# Patient Record
Sex: FEMALE | Race: WHITE | Employment: PART TIME
[De-identification: names, ages, dates, MRNs, and addresses within clinical notes are randomized per-mention and may not be internally consistent; named-entity substitution may affect disease eponyms.]

---

## 2023-08-25 PROBLEM — I51.89 IMPAIRED CARDIAC FUNCTION: Status: ACTIVE | Noted: 2023-08-25

## 2023-08-25 PROBLEM — I25.10 ATHEROSCLEROTIC HEART DISEASE OF NATIVE CORONARY ARTERY WITHOUT ANGINA PECTORIS: Status: ACTIVE | Noted: 2023-08-25

## 2023-08-25 PROBLEM — M25.511 RIGHT SHOULDER PAIN: Status: ACTIVE | Noted: 2019-11-26

## 2023-08-25 PROBLEM — R42 INTERMITTENT VERTIGO: Status: ACTIVE | Noted: 2020-09-21

## 2023-08-25 PROBLEM — S99.929A INJURY OF TOE: Status: ACTIVE | Noted: 2023-08-25

## 2023-08-25 PROBLEM — I50.9 HEART FAILURE (MULTI): Status: ACTIVE | Noted: 2023-08-25

## 2023-08-25 PROBLEM — Z95.1 S/P CORONARY ARTERY BYPASS GRAFT X 3: Status: ACTIVE | Noted: 2023-08-25

## 2023-08-25 PROBLEM — R06.83 SNORES: Status: ACTIVE | Noted: 2021-11-11

## 2023-08-25 PROBLEM — E11.9 DM (DIABETES MELLITUS) (MULTI): Status: ACTIVE | Noted: 2023-08-25

## 2023-08-25 PROBLEM — H93.13 TINNITUS OF BOTH EARS: Status: ACTIVE | Noted: 2023-08-25

## 2023-08-25 PROBLEM — U07.1 LAB TEST POSITIVE FOR DETECTION OF COVID-19 VIRUS: Status: RESOLVED | Noted: 2020-11-23 | Resolved: 2023-08-25

## 2023-08-25 PROBLEM — F33.9 DEPRESSION, RECURRENT (CMS-HCC): Status: ACTIVE | Noted: 2019-11-26

## 2023-08-25 PROBLEM — F43.22 ADJUSTMENT REACTION WITH ANXIETY: Status: ACTIVE | Noted: 2020-09-21

## 2023-08-25 PROBLEM — N95.0 POST-MENOPAUSE BLEEDING: Status: ACTIVE | Noted: 2022-05-26

## 2023-08-25 PROBLEM — S89.90XA INJURY OF KNEE: Status: ACTIVE | Noted: 2023-08-25

## 2023-08-25 PROBLEM — F43.23 ADJUSTMENT REACTION WITH ANXIETY AND DEPRESSION: Status: ACTIVE | Noted: 2019-12-16

## 2023-08-25 PROBLEM — N18.2 CHRONIC KIDNEY DISEASE, STAGE 2 (MILD): Status: ACTIVE | Noted: 2023-08-25

## 2023-08-25 PROBLEM — H90.6 MIXED CONDUCTIVE AND SENSORINEURAL HEARING LOSS OF BOTH EARS: Status: ACTIVE | Noted: 2023-08-25

## 2023-08-25 PROBLEM — E78.2 HYPERLIPEMIA, MIXED: Status: ACTIVE | Noted: 2023-08-25

## 2023-08-25 PROBLEM — M25.569 KNEE PAIN: Status: ACTIVE | Noted: 2023-08-25

## 2023-08-25 PROBLEM — I49.9 CARDIAC RHYTHM DISORDER OR DISTURBANCE OR CHANGE: Status: ACTIVE | Noted: 2023-08-25

## 2023-08-25 PROBLEM — E11.610: Status: ACTIVE | Noted: 2023-08-25

## 2023-08-25 PROBLEM — E11.65 HYPERGLYCEMIA DUE TO TYPE 2 DIABETES MELLITUS (MULTI): Status: ACTIVE | Noted: 2023-08-25

## 2023-08-25 PROBLEM — E55.9 VITAMIN D DEFICIENCY: Status: ACTIVE | Noted: 2021-11-11

## 2023-08-25 PROBLEM — R73.09 BLOOD GLUCOSE ABNORMAL: Status: ACTIVE | Noted: 2023-08-25

## 2023-08-25 PROBLEM — E11.21 TYPE 2 DIABETES MELLITUS WITH DIABETIC NEPHROPATHY (MULTI): Status: ACTIVE | Noted: 2023-08-25

## 2023-08-25 PROBLEM — F43.21 SITUATIONAL DEPRESSION: Status: ACTIVE | Noted: 2022-05-26

## 2023-08-25 PROBLEM — E53.8 VITAMIN B 12 DEFICIENCY: Status: ACTIVE | Noted: 2021-11-11

## 2023-08-25 PROBLEM — F41.9 ANXIETY: Status: ACTIVE | Noted: 2021-11-11

## 2023-08-25 PROBLEM — M54.2 NECK PAIN ON RIGHT SIDE: Status: ACTIVE | Noted: 2019-11-26

## 2023-08-25 PROBLEM — I10 BENIGN HYPERTENSION: Status: ACTIVE | Noted: 2023-08-25

## 2023-08-25 PROBLEM — E11.3293 TYPE 2 DIABETES MELLITUS WITH MILD NONPROLIFERATIVE DIABETIC RETINOPATHY WITHOUT MACULAR EDEMA, BILATERAL (MULTI): Status: ACTIVE | Noted: 2023-08-25

## 2023-08-25 PROBLEM — R07.81 RIB PAIN: Status: ACTIVE | Noted: 2023-08-25

## 2023-08-25 PROBLEM — E78.00 ELEVATED CHOLESTEROL: Status: ACTIVE | Noted: 2023-08-25

## 2023-08-25 PROBLEM — R79.0 LOW MAGNESIUM LEVELS: Status: ACTIVE | Noted: 2021-11-11

## 2023-08-25 RX ORDER — METFORMIN HYDROCHLORIDE 1000 MG/1
TABLET ORAL
COMMUNITY
Start: 2014-05-13 | End: 2023-11-09 | Stop reason: ALTCHOICE

## 2023-08-25 RX ORDER — SEMAGLUTIDE 2.68 MG/ML
2 INJECTION, SOLUTION SUBCUTANEOUS
COMMUNITY
Start: 2023-05-02 | End: 2023-11-17 | Stop reason: ALTCHOICE

## 2023-08-25 RX ORDER — BLOOD-GLUCOSE METER
EACH MISCELLANEOUS 4 TIMES DAILY
COMMUNITY
Start: 2019-11-26 | End: 2023-12-29 | Stop reason: ALTCHOICE

## 2023-08-25 RX ORDER — CARVEDILOL 6.25 MG/1
TABLET ORAL
COMMUNITY
Start: 2014-05-13 | End: 2023-11-09 | Stop reason: ALTCHOICE

## 2023-08-25 RX ORDER — DAPAGLIFLOZIN 10 MG/1
1 TABLET, FILM COATED ORAL DAILY
COMMUNITY
Start: 2023-03-21 | End: 2023-11-09 | Stop reason: SDUPTHER

## 2023-08-25 RX ORDER — CARVEDILOL 12.5 MG/1
1 TABLET ORAL DAILY
COMMUNITY
Start: 2020-09-21 | End: 2023-11-02

## 2023-08-25 RX ORDER — PEN NEEDLE, DIABETIC 30 GX3/16"
1 NEEDLE, DISPOSABLE MISCELLANEOUS 4 TIMES DAILY
COMMUNITY

## 2023-08-25 RX ORDER — PRAVASTATIN SODIUM 20 MG/1
1 TABLET ORAL NIGHTLY
COMMUNITY
Start: 2014-05-13 | End: 2024-05-13

## 2023-08-25 RX ORDER — INSULIN LISPRO 100 [IU]/ML
INJECTION, SOLUTION INTRAVENOUS; SUBCUTANEOUS
COMMUNITY
Start: 2020-09-28 | End: 2023-11-09 | Stop reason: ALTCHOICE

## 2023-08-25 RX ORDER — DAPAGLIFLOZIN 5 MG/1
TABLET, FILM COATED ORAL
COMMUNITY
Start: 2021-10-05 | End: 2023-11-09 | Stop reason: ALTCHOICE

## 2023-08-25 RX ORDER — ASPIRIN 325 MG
TABLET ORAL
COMMUNITY
Start: 2014-05-13 | End: 2023-11-09 | Stop reason: ALTCHOICE

## 2023-08-25 RX ORDER — LOSARTAN POTASSIUM 100 MG/1
1 TABLET ORAL DAILY
COMMUNITY
Start: 2020-09-21 | End: 2023-12-27

## 2023-08-25 RX ORDER — AMLODIPINE BESYLATE 5 MG/1
1 TABLET ORAL DAILY
COMMUNITY
Start: 2014-05-13 | End: 2023-11-09 | Stop reason: ALTCHOICE

## 2023-08-25 RX ORDER — CHOLECALCIFEROL (VITAMIN D3) 125 MCG
1 CAPSULE ORAL DAILY
COMMUNITY
Start: 2023-05-02

## 2023-08-25 RX ORDER — ESCITALOPRAM OXALATE 10 MG/1
1 TABLET ORAL DAILY
COMMUNITY
Start: 2019-11-26 | End: 2023-11-09 | Stop reason: SDUPTHER

## 2023-08-25 RX ORDER — SEMAGLUTIDE 1.34 MG/ML
INJECTION, SOLUTION SUBCUTANEOUS
COMMUNITY
Start: 2021-02-09 | End: 2023-11-09 | Stop reason: ALTCHOICE

## 2023-08-25 RX ORDER — FLASH GLUCOSE SENSOR
KIT MISCELLANEOUS
COMMUNITY
Start: 2022-05-26

## 2023-08-25 RX ORDER — VIT C/E/ZN/COPPR/LUTEIN/ZEAXAN 250MG-90MG
1 CAPSULE ORAL DAILY
COMMUNITY
End: 2023-11-17 | Stop reason: ALTCHOICE

## 2023-08-25 RX ORDER — ERGOCALCIFEROL 1.25 MG/1
CAPSULE ORAL
COMMUNITY
Start: 2020-12-01 | End: 2023-11-09 | Stop reason: ALTCHOICE

## 2023-08-25 RX ORDER — INSULIN DEGLUDEC 100 U/ML
32 INJECTION, SOLUTION SUBCUTANEOUS NIGHTLY
COMMUNITY
Start: 2019-11-26 | End: 2023-11-17 | Stop reason: ALTCHOICE

## 2023-08-25 RX ORDER — INSULIN GLARGINE 100 [IU]/ML
INJECTION, SOLUTION SUBCUTANEOUS
COMMUNITY
Start: 2014-05-13 | End: 2023-11-09 | Stop reason: ALTCHOICE

## 2023-08-25 RX ORDER — HUMAN INSULIN 100 [IU]/ML
INJECTION, SOLUTION SUBCUTANEOUS
COMMUNITY
Start: 2014-05-13 | End: 2023-11-09 | Stop reason: ALTCHOICE

## 2023-08-25 RX ORDER — VALSARTAN AND HYDROCHLOROTHIAZIDE 160; 25 MG/1; MG/1
TABLET ORAL
COMMUNITY
Start: 2014-05-13 | End: 2023-11-09 | Stop reason: ALTCHOICE

## 2023-08-25 RX ORDER — AMLODIPINE BESYLATE 10 MG/1
1 TABLET ORAL DAILY
COMMUNITY
Start: 2020-09-21 | End: 2024-05-13

## 2023-08-25 RX ORDER — DICLOFENAC SODIUM 10 MG/G
GEL TOPICAL DAILY
COMMUNITY
Start: 2020-12-16 | End: 2023-12-29 | Stop reason: ALTCHOICE

## 2023-08-25 RX ORDER — SEMAGLUTIDE 1.34 MG/ML
INJECTION, SOLUTION SUBCUTANEOUS
COMMUNITY
Start: 2021-08-03 | End: 2023-11-09 | Stop reason: ALTCHOICE

## 2023-10-22 ENCOUNTER — APPOINTMENT (OUTPATIENT)
Facility: HOSPITAL | Age: 68
End: 2023-10-22
Payer: COMMERCIAL

## 2023-10-22 ENCOUNTER — HOSPITAL ENCOUNTER (EMERGENCY)
Facility: HOSPITAL | Age: 68
Discharge: HOME | End: 2023-10-22
Attending: EMERGENCY MEDICINE
Payer: COMMERCIAL

## 2023-10-22 VITALS
TEMPERATURE: 97.9 F | OXYGEN SATURATION: 98 % | BODY MASS INDEX: 26.68 KG/M2 | HEIGHT: 62 IN | DIASTOLIC BLOOD PRESSURE: 59 MMHG | RESPIRATION RATE: 16 BRPM | SYSTOLIC BLOOD PRESSURE: 135 MMHG | WEIGHT: 145 LBS | HEART RATE: 55 BPM

## 2023-10-22 DIAGNOSIS — S42.352A COMMINUTED FRACTURE OF HUMERUS, LEFT, CLOSED, INITIAL ENCOUNTER: Primary | ICD-10-CM

## 2023-10-22 DIAGNOSIS — S20.212A RIB CONTUSION, LEFT, INITIAL ENCOUNTER: ICD-10-CM

## 2023-10-22 DIAGNOSIS — S02.32XA CLOSED FRACTURE OF LEFT ORBITAL FLOOR, INITIAL ENCOUNTER (HCC): ICD-10-CM

## 2023-10-22 PROCEDURE — 70450 CT HEAD/BRAIN W/O DYE: CPT

## 2023-10-22 PROCEDURE — 6360000002 HC RX W HCPCS: Performed by: EMERGENCY MEDICINE

## 2023-10-22 PROCEDURE — 71045 X-RAY EXAM CHEST 1 VIEW: CPT

## 2023-10-22 PROCEDURE — 96372 THER/PROPH/DIAG INJ SC/IM: CPT

## 2023-10-22 PROCEDURE — 6370000000 HC RX 637 (ALT 250 FOR IP): Performed by: EMERGENCY MEDICINE

## 2023-10-22 PROCEDURE — 73030 X-RAY EXAM OF SHOULDER: CPT

## 2023-10-22 PROCEDURE — 72125 CT NECK SPINE W/O DYE: CPT

## 2023-10-22 PROCEDURE — 70486 CT MAXILLOFACIAL W/O DYE: CPT

## 2023-10-22 PROCEDURE — 99284 EMERGENCY DEPT VISIT MOD MDM: CPT

## 2023-10-22 RX ORDER — OXYCODONE HYDROCHLORIDE AND ACETAMINOPHEN 5; 325 MG/1; MG/1
1 TABLET ORAL EVERY 4 HOURS PRN
Qty: 15 TABLET | Refills: 0 | Status: SHIPPED | OUTPATIENT
Start: 2023-10-22 | End: 2023-10-25

## 2023-10-22 RX ORDER — KETOROLAC TROMETHAMINE 10 MG/1
10 TABLET, FILM COATED ORAL EVERY 6 HOURS PRN
Qty: 20 TABLET | Refills: 0 | Status: SHIPPED | OUTPATIENT
Start: 2023-10-22

## 2023-10-22 RX ORDER — OXYCODONE HYDROCHLORIDE AND ACETAMINOPHEN 5; 325 MG/1; MG/1
2 TABLET ORAL
Status: COMPLETED | OUTPATIENT
Start: 2023-10-22 | End: 2023-10-22

## 2023-10-22 RX ORDER — MORPHINE SULFATE 2 MG/ML
6 INJECTION, SOLUTION INTRAMUSCULAR; INTRAVENOUS
Status: COMPLETED | OUTPATIENT
Start: 2023-10-22 | End: 2023-10-22

## 2023-10-22 RX ORDER — AMOXICILLIN AND CLAVULANATE POTASSIUM 875; 125 MG/1; MG/1
1 TABLET, FILM COATED ORAL 2 TIMES DAILY
Qty: 10 TABLET | Refills: 0 | Status: SHIPPED | OUTPATIENT
Start: 2023-10-22 | End: 2023-10-27

## 2023-10-22 RX ADMIN — MORPHINE SULFATE 6 MG: 2 INJECTION, SOLUTION INTRAMUSCULAR; INTRAVENOUS at 10:58

## 2023-10-22 RX ADMIN — OXYCODONE AND ACETAMINOPHEN 2 TABLET: 5; 325 TABLET ORAL at 12:49

## 2023-10-22 ASSESSMENT — PAIN SCALES - GENERAL
PAINLEVEL_OUTOF10: 9
PAINLEVEL_OUTOF10: 10
PAINLEVEL_OUTOF10: 10

## 2023-10-22 ASSESSMENT — PAIN - FUNCTIONAL ASSESSMENT: PAIN_FUNCTIONAL_ASSESSMENT: 0-10

## 2023-10-22 ASSESSMENT — PAIN DESCRIPTION - ORIENTATION: ORIENTATION: LEFT

## 2023-10-22 ASSESSMENT — PAIN DESCRIPTION - LOCATION: LOCATION: SHOULDER

## 2023-10-22 NOTE — ED NOTES
Pt discharged via ambulatory to Home. Pt Verbalized understanding and Demonstrated Understanding of discharge instructions. Vital signs stable.           Veronica Negrete RN  10/22/23 [id number]

## 2023-10-22 NOTE — DISCHARGE INSTRUCTIONS
Wear the arm sling at all times except while showering. Take an oral anti-inflammatory combined with the Percocet I prescribed for pain relief. Can supplement with IcyHot, ice pack to the painful area.

## 2023-10-22 NOTE — ED TRIAGE NOTES
Pt c/o left shoulder/left arm/facia/left chest pain s/p fall. Per pt she was looking around in the building when she fell on a ramp that's 5 ft high. Pt fell face forward. Denies taking blood thinners, denies LOC.

## 2023-10-22 NOTE — ED PROVIDER NOTES
EMERGENCY DEPARTMENT HISTORY AND PHYSICAL EXAM      Date: 10/22/2023  Patient Name: Fransico Trujillo      History of Presenting Illness     Chief Complaint   Patient presents with    Fall    Arm Injury       Location/Duration/Severity/Modifying factors   Chief Complaint   Patient presents with    Fall    Arm Injury       HPI:  Fransico Trujillo is a 80 y.o. female with PMH significant for noncontributory presents with left-sided shoulder pain after ground-level fall as well as left-sided lower chest pain, left-sided bruising and swelling to the left side of her face after ground-level fall this morning. Pt  denies loss of consciousness, neck pain, shortness of breath, back pain, hip or pelvic pain. . Treatments attempted at home include none. PCP: Kirti Maldonado MD    Current Facility-Administered Medications   Medication Dose Route Frequency Provider Last Rate Last Admin    oxyCODONE-acetaminophen (PERCOCET) 5-325 MG per tablet 2 tablet  2 tablet Oral NOW Wandra Christiano, DO         Current Outpatient Medications   Medication Sig Dispense Refill    amoxicillin-clavulanate (AUGMENTIN) 875-125 MG per tablet Take 1 tablet by mouth 2 times daily for 5 days For infection prophylaxis for left side inferior orbital wall fracture with appearance of bleed within the sinus 10 tablet 0    oxyCODONE-acetaminophen (PERCOCET) 5-325 MG per tablet Take 1 tablet by mouth every 4 hours as needed for Pain (For moderate to severe breakthrough pain) for up to 3 days. Intended supply: 3 days. Take lowest dose possible to manage pain Max Daily Amount: 6 tablets 15 tablet 0    ketorolac (TORADOL) 10 MG tablet Take 1 tablet by mouth every 6 hours as needed for Pain 20 tablet 0       Past History     Past Medical History:  No past medical history on file. Past Surgical History:  No past surgical history on file. Family History:  No family history on file. Social History: Allergies:   Allergies   Allergen Reactions    Aleve

## 2023-10-25 ENCOUNTER — APPOINTMENT (OUTPATIENT)
Dept: PRIMARY CARE | Facility: CLINIC | Age: 68
End: 2023-10-25
Payer: COMMERCIAL

## 2023-10-25 ENCOUNTER — APPOINTMENT (OUTPATIENT)
Dept: CARDIOLOGY | Facility: CLINIC | Age: 68
End: 2023-10-25
Payer: COMMERCIAL

## 2023-10-27 ENCOUNTER — PHARMACY VISIT (OUTPATIENT)
Dept: PHARMACY | Facility: CLINIC | Age: 68
End: 2023-10-27
Payer: COMMERCIAL

## 2023-10-27 DIAGNOSIS — E11.3293 TYPE 2 DIABETES MELLITUS WITH BOTH EYES AFFECTED BY MILD NONPROLIFERATIVE RETINOPATHY WITHOUT MACULAR EDEMA, WITH LONG-TERM CURRENT USE OF INSULIN (MULTI): Primary | ICD-10-CM

## 2023-10-27 DIAGNOSIS — Z79.4 TYPE 2 DIABETES MELLITUS WITH BOTH EYES AFFECTED BY MILD NONPROLIFERATIVE RETINOPATHY WITHOUT MACULAR EDEMA, WITH LONG-TERM CURRENT USE OF INSULIN (MULTI): Primary | ICD-10-CM

## 2023-10-27 PROCEDURE — RXMED WILLOW AMBULATORY MEDICATION CHARGE

## 2023-10-27 RX ORDER — INSULIN DEGLUDEC 100 U/ML
INJECTION, SOLUTION SUBCUTANEOUS
Qty: 30 ML | Refills: 5 | Status: CANCELLED | OUTPATIENT
Start: 2023-10-27 | End: 2024-10-26

## 2023-10-27 RX ORDER — INSULIN DEGLUDEC 100 U/ML
INJECTION, SOLUTION SUBCUTANEOUS
Qty: 30 ML | Refills: 5 | Status: SHIPPED | OUTPATIENT
Start: 2023-10-27

## 2023-10-31 ENCOUNTER — HOSPITAL ENCOUNTER (OUTPATIENT)
Dept: RADIOLOGY | Facility: CLINIC | Age: 68
Discharge: HOME | End: 2023-10-31
Payer: COMMERCIAL

## 2023-10-31 ENCOUNTER — OFFICE VISIT (OUTPATIENT)
Dept: ORTHOPEDIC SURGERY | Facility: CLINIC | Age: 68
End: 2023-10-31
Payer: COMMERCIAL

## 2023-10-31 VITALS — WEIGHT: 145 LBS | HEIGHT: 62 IN | BODY MASS INDEX: 26.68 KG/M2

## 2023-10-31 DIAGNOSIS — S42.292D CLOSED 3-PART FRACTURE OF PROXIMAL HUMERUS WITH ROUTINE HEALING, LEFT: ICD-10-CM

## 2023-10-31 DIAGNOSIS — M79.602 LEFT ARM PAIN: ICD-10-CM

## 2023-10-31 DIAGNOSIS — M25.522 LEFT ELBOW PAIN: Primary | ICD-10-CM

## 2023-10-31 DIAGNOSIS — M25.522 LEFT ELBOW PAIN: ICD-10-CM

## 2023-10-31 PROCEDURE — 1160F RVW MEDS BY RX/DR IN RCRD: CPT | Performed by: ORTHOPAEDIC SURGERY

## 2023-10-31 PROCEDURE — 1159F MED LIST DOCD IN RCRD: CPT | Performed by: ORTHOPAEDIC SURGERY

## 2023-10-31 PROCEDURE — 23600 CLTX PROX HUMRL FX W/O MNPJ: CPT | Performed by: ORTHOPAEDIC SURGERY

## 2023-10-31 PROCEDURE — 3078F DIAST BP <80 MM HG: CPT | Performed by: ORTHOPAEDIC SURGERY

## 2023-10-31 PROCEDURE — 1036F TOBACCO NON-USER: CPT | Performed by: ORTHOPAEDIC SURGERY

## 2023-10-31 PROCEDURE — 73060 X-RAY EXAM OF HUMERUS: CPT | Mod: LT,FY

## 2023-10-31 PROCEDURE — 3077F SYST BP >= 140 MM HG: CPT | Performed by: ORTHOPAEDIC SURGERY

## 2023-10-31 PROCEDURE — 1125F AMNT PAIN NOTED PAIN PRSNT: CPT | Performed by: ORTHOPAEDIC SURGERY

## 2023-10-31 PROCEDURE — 73070 X-RAY EXAM OF ELBOW: CPT | Mod: LT

## 2023-10-31 PROCEDURE — 4010F ACE/ARB THERAPY RXD/TAKEN: CPT | Performed by: ORTHOPAEDIC SURGERY

## 2023-10-31 RX ORDER — OXYCODONE AND ACETAMINOPHEN 5; 325 MG/1; MG/1
1 TABLET ORAL EVERY 6 HOURS PRN
COMMUNITY
End: 2023-12-29 | Stop reason: ALTCHOICE

## 2023-10-31 ASSESSMENT — PAIN - FUNCTIONAL ASSESSMENT: PAIN_FUNCTIONAL_ASSESSMENT: 0-10

## 2023-10-31 ASSESSMENT — PATIENT HEALTH QUESTIONNAIRE - PHQ9
1. LITTLE INTEREST OR PLEASURE IN DOING THINGS: NOT AT ALL
SUM OF ALL RESPONSES TO PHQ9 QUESTIONS 1 AND 2: 0
2. FEELING DOWN, DEPRESSED OR HOPELESS: NOT AT ALL

## 2023-10-31 ASSESSMENT — ENCOUNTER SYMPTOMS
LOSS OF SENSATION IN FEET: 0
OCCASIONAL FEELINGS OF UNSTEADINESS: 0
DEPRESSION: 0

## 2023-10-31 ASSESSMENT — PAIN DESCRIPTION - DESCRIPTORS: DESCRIPTORS: ACHING;SORE

## 2023-10-31 ASSESSMENT — PAIN SCALES - GENERAL: PAINLEVEL_OUTOF10: 7

## 2023-10-31 NOTE — PROGRESS NOTES
Subjective      Chief Complaint   Patient presents with    Left Shoulder - Pain        Past Surgical History:   Procedure Laterality Date    OTHER SURGICAL HISTORY  12/13/2021    Heart surgery        HPI  This 68 year old patient presents today with left shoulder pain 8. The patient states that the left shoulder pain has been present since a fall in [place]. She hit her head but did not loose consciousness. The patient states that the left shoulder pain is worse with and aggravated by reaching and lifting. The patient states that this shoulder pain is disabling and presents today to discuss further options. The patient states that they have tried tylenol and ibuprofen with no relief.    CARDIOLOGY:   Negative for chest pain, shortness of breath.   RESPIRATORY:   Negative for chest pain, shortness of breath.   MUSCULOSKELETAL:   See HPI for details.   NEUROLOGY:   Negative for tingling, numbness, weakness.    Objective      There were no vitals taken for this visit.     SHOULDER EXAM  Constitutional: Appears stated age. No apparent distress  Labored Breathing: No  Psychiatric: Normal mood and effect.   Neurological: alert and oriented x3  Skin: intact  HEENT: No bruising, otorrhea, rhinorrhea. EOM intact.   MUSCULOSKELETAL: Neck: No tenderness. No pain or limitation with range of motion. Back: No tenderness. Straight leg test negative bilaterally. left shoulder: There is tenderness anteriorly and laterally. Patient is unable to move the left shoulder in any pain secondary to pain.   Comparments are soft. Neurovascular is intact.     AP and lateral x-rays of the humerus and elbow show a comminuted fracture at the left proximal humerus.   CT Maxillofacial without contrast shows inferior left orbital wall fracture. CT of the head shows no evidence of acute fracture or intracranial process. CT cervical spine shows no acute fracture.    Simran was seen today for pain.  Diagnoses and all orders for this visit:  Left elbow  pain (Primary)  -     XR elbow left 1-2 views; Future  Left arm pain  -     XR humerus left; Future  Closed 3-part fracture of proximal humerus with routine healing, left  Options are discussed with the patient in the presence of her  in detail.  An initial attempt at nonoperative treatment with immobilization in a sling and then gradually gaining range of motion with its risks and benefits and alternatives (operative treatment with operative reduction and internal fixation are discussed.  The patient strongly wishes an initial attempt at nonoperative treatment with immobilization and asked my opinion.  I agree that the most prudent course of treatment is an initial attempt at nonoperative treatment with immobilization.  The patient is instructed regarding activity modification and risk for further injury with falling or trauma, use of the sling at all times and only gentle passive range of motion ice, and the appropriate use of Tylenol as needed for pain with its potential adverse reactions and side effects. The patient understands and understands that operative treatment may be needed if there is any movement at the fracture site.  Return in 2 weeks for reevaluation and niki-ray or sooner as needed.. Please note that this report has been produced using speech recognition software.  It may contain errors related to grammar, punctuation or spelling.  Electronically signed, but not reviewed.    Kael Hernandez MD

## 2023-11-02 DIAGNOSIS — I50.22 CHRONIC SYSTOLIC CONGESTIVE HEART FAILURE (MULTI): Primary | ICD-10-CM

## 2023-11-02 RX ORDER — CARVEDILOL 12.5 MG/1
12.5 TABLET ORAL 2 TIMES DAILY
Qty: 180 TABLET | Refills: 3 | Status: SHIPPED | OUTPATIENT
Start: 2023-11-02 | End: 2024-11-01

## 2023-11-03 ENCOUNTER — APPOINTMENT (OUTPATIENT)
Dept: ORTHOPEDIC SURGERY | Facility: CLINIC | Age: 68
End: 2023-11-03
Payer: COMMERCIAL

## 2023-11-09 ENCOUNTER — OFFICE VISIT (OUTPATIENT)
Dept: PRIMARY CARE | Facility: CLINIC | Age: 68
End: 2023-11-09
Payer: COMMERCIAL

## 2023-11-09 VITALS
HEIGHT: 62 IN | DIASTOLIC BLOOD PRESSURE: 72 MMHG | WEIGHT: 140 LBS | BODY MASS INDEX: 25.76 KG/M2 | SYSTOLIC BLOOD PRESSURE: 134 MMHG

## 2023-11-09 DIAGNOSIS — Z12.39 BREAST SCREENING: Primary | ICD-10-CM

## 2023-11-09 DIAGNOSIS — F43.23 ADJUSTMENT REACTION WITH ANXIETY AND DEPRESSION: ICD-10-CM

## 2023-11-09 PROCEDURE — 1160F RVW MEDS BY RX/DR IN RCRD: CPT | Performed by: FAMILY MEDICINE

## 2023-11-09 PROCEDURE — 3075F SYST BP GE 130 - 139MM HG: CPT | Performed by: FAMILY MEDICINE

## 2023-11-09 PROCEDURE — 99213 OFFICE O/P EST LOW 20 MIN: CPT | Performed by: FAMILY MEDICINE

## 2023-11-09 PROCEDURE — 3078F DIAST BP <80 MM HG: CPT | Performed by: FAMILY MEDICINE

## 2023-11-09 PROCEDURE — 1159F MED LIST DOCD IN RCRD: CPT | Performed by: FAMILY MEDICINE

## 2023-11-09 PROCEDURE — 4010F ACE/ARB THERAPY RXD/TAKEN: CPT | Performed by: FAMILY MEDICINE

## 2023-11-09 PROCEDURE — 1036F TOBACCO NON-USER: CPT | Performed by: FAMILY MEDICINE

## 2023-11-09 PROCEDURE — 1125F AMNT PAIN NOTED PAIN PRSNT: CPT | Performed by: FAMILY MEDICINE

## 2023-11-09 RX ORDER — ESCITALOPRAM OXALATE 10 MG/1
10 TABLET ORAL DAILY
Qty: 30 TABLET | Refills: 5 | Status: SHIPPED | OUTPATIENT
Start: 2023-11-09 | End: 2024-05-15 | Stop reason: SDUPTHER

## 2023-11-09 ASSESSMENT — ENCOUNTER SYMPTOMS
SHORTNESS OF BREATH: 0
ABDOMINAL PAIN: 0
DEPRESSION: 0
OCCASIONAL FEELINGS OF UNSTEADINESS: 0
UNEXPECTED WEIGHT CHANGE: 0
LOSS OF SENSATION IN FEET: 0
CHEST TIGHTNESS: 0
ARTHRALGIAS: 0
COUGH: 0
WEAKNESS: 0
HEADACHES: 0
AGITATION: 0

## 2023-11-09 ASSESSMENT — PATIENT HEALTH QUESTIONNAIRE - PHQ9
2. FEELING DOWN, DEPRESSED OR HOPELESS: NOT AT ALL
1. LITTLE INTEREST OR PLEASURE IN DOING THINGS: NOT AT ALL
SUM OF ALL RESPONSES TO PHQ9 QUESTIONS 1 AND 2: 0

## 2023-11-09 NOTE — PROGRESS NOTES
Patient is here today for anxiety recheck.  She is taking Lexapro 10mg daily and needs this refilled today.  No other issues or problems.    She says she is doing well on this dose.    She sees [facility] for her diabetes care.   She was not completely sure on her diabetic medication doses.    She also sees Dr. Gross cardiologist for her blood pressure and cholesterol medications for her.    Mammogram-   2021 would like order today  Colonoscopy-  declines referral today

## 2023-11-09 NOTE — PROGRESS NOTES
Subjective   Patient ID: Simran Burroughs is a 68 y.o. female who presents for Anxiety.  Anxiety  Patient reports no chest pain or shortness of breath.          Patient is here today for anxiety recheck.  She is taking Lexapro 10mg daily and needs this refilled today.  No other issues or problems.    She says she is doing well on this dose.    She sees [facility]. [facility] for her diabetes care.   She was not completely sure on her diabetic medication doses.    She also sees Dr. Gross cardiologist for her blood pressure and cholesterol medications for her.    Mammogram-   2021 would like order today  Colonoscopy-  declines referral today  HPI reviewed/agree  Pt generally feels fine  Meds seem ok   helps anxiety beautifully  no questions   she fell and fractured left arm but it is healing nicely  Review of Systems   Constitutional:  Negative for unexpected weight change.   Respiratory:  Negative for cough, chest tightness and shortness of breath.    Cardiovascular:  Negative for chest pain.   Gastrointestinal:  Negative for abdominal pain.   Musculoskeletal:  Negative for arthralgias.   Skin:  Negative for rash.   Neurological:  Negative for weakness and headaches.   Psychiatric/Behavioral:  Negative for agitation.        Objective   Physical Exam  Constitutional:       Appearance: Normal appearance.   HENT:      Head: Normocephalic and atraumatic.      Nose: Nose normal.      Mouth/Throat:      Pharynx: Oropharynx is clear.   Eyes:      Extraocular Movements: Extraocular movements intact.      Pupils: Pupils are equal, round, and reactive to light.   Cardiovascular:      Rate and Rhythm: Normal rate and regular rhythm.      Pulses: Normal pulses.      Heart sounds: Normal heart sounds. No murmur heard.  Pulmonary:      Effort: Pulmonary effort is normal.      Breath sounds: Normal breath sounds.   Abdominal:      General: Abdomen is flat. Bowel sounds are normal.      Palpations: Abdomen is soft.       Tenderness: There is no abdominal tenderness.   Musculoskeletal:         General: Normal range of motion.      Comments: Left arm in sling   Skin:     General: Skin is warm and dry.   Neurological:      General: No focal deficit present.      Mental Status: She is alert and oriented to person, place, and time.   Psychiatric:         Mood and Affect: Mood normal.         Behavior: Behavior normal.         Assessment/Plan   Diagnoses and all orders for this visit:  Breast screening  -     BI mammo bilateral screening; Future  Adjustment reaction with anxiety and depression  -     escitalopram (Lexapro) 10 mg tablet; Take 1 tablet (10 mg) by mouth once daily.

## 2023-11-16 ENCOUNTER — PHARMACY VISIT (OUTPATIENT)
Dept: PHARMACY | Facility: CLINIC | Age: 68
End: 2023-11-16
Payer: COMMERCIAL

## 2023-11-16 DIAGNOSIS — E11.69 TYPE 2 DIABETES MELLITUS WITH OTHER SPECIFIED COMPLICATION, UNSPECIFIED WHETHER LONG TERM INSULIN USE (MULTI): Primary | ICD-10-CM

## 2023-11-16 DIAGNOSIS — Z79.4 TYPE 2 DIABETES MELLITUS WITH DIABETIC NEPHROPATHY, WITH LONG-TERM CURRENT USE OF INSULIN (MULTI): Primary | ICD-10-CM

## 2023-11-16 DIAGNOSIS — E11.21 TYPE 2 DIABETES MELLITUS WITH DIABETIC NEPHROPATHY, WITH LONG-TERM CURRENT USE OF INSULIN (MULTI): Primary | ICD-10-CM

## 2023-11-16 PROCEDURE — RXMED WILLOW AMBULATORY MEDICATION CHARGE

## 2023-11-16 RX ORDER — SEMAGLUTIDE 2.68 MG/ML
INJECTION, SOLUTION SUBCUTANEOUS
Qty: 3 ML | Refills: 5 | Status: SHIPPED | OUTPATIENT
Start: 2023-11-16 | End: 2024-11-15

## 2023-11-16 RX ORDER — DAPAGLIFLOZIN 10 MG/1
10 TABLET, FILM COATED ORAL DAILY
Qty: 30 TABLET | Refills: 5 | Status: SHIPPED | OUTPATIENT
Start: 2023-11-16 | End: 2024-11-15

## 2023-11-17 ENCOUNTER — HOSPITAL ENCOUNTER (OUTPATIENT)
Dept: RADIOLOGY | Facility: CLINIC | Age: 68
Discharge: HOME | End: 2023-11-17
Payer: COMMERCIAL

## 2023-11-17 ENCOUNTER — OFFICE VISIT (OUTPATIENT)
Dept: ORTHOPEDIC SURGERY | Facility: CLINIC | Age: 68
End: 2023-11-17
Payer: COMMERCIAL

## 2023-11-17 VITALS — BODY MASS INDEX: 25.8 KG/M2 | HEIGHT: 62 IN | WEIGHT: 140.21 LBS

## 2023-11-17 DIAGNOSIS — S42.292D CLOSED 3-PART FRACTURE OF PROXIMAL HUMERUS WITH ROUTINE HEALING, LEFT: ICD-10-CM

## 2023-11-17 DIAGNOSIS — S42.90XA SHOULDER FRACTURE: ICD-10-CM

## 2023-11-17 DIAGNOSIS — M79.602 LEFT ARM PAIN: Primary | ICD-10-CM

## 2023-11-17 PROCEDURE — 1159F MED LIST DOCD IN RCRD: CPT | Performed by: PHYSICIAN ASSISTANT

## 2023-11-17 PROCEDURE — 1036F TOBACCO NON-USER: CPT | Performed by: PHYSICIAN ASSISTANT

## 2023-11-17 PROCEDURE — 3066F NEPHROPATHY DOC TX: CPT | Performed by: PHYSICIAN ASSISTANT

## 2023-11-17 PROCEDURE — 4010F ACE/ARB THERAPY RXD/TAKEN: CPT | Performed by: PHYSICIAN ASSISTANT

## 2023-11-17 PROCEDURE — 73030 X-RAY EXAM OF SHOULDER: CPT | Mod: LT,FY

## 2023-11-17 PROCEDURE — 99024 POSTOP FOLLOW-UP VISIT: CPT | Performed by: PHYSICIAN ASSISTANT

## 2023-11-17 PROCEDURE — 1160F RVW MEDS BY RX/DR IN RCRD: CPT | Performed by: PHYSICIAN ASSISTANT

## 2023-11-17 PROCEDURE — 1125F AMNT PAIN NOTED PAIN PRSNT: CPT | Performed by: PHYSICIAN ASSISTANT

## 2023-11-17 ASSESSMENT — PATIENT HEALTH QUESTIONNAIRE - PHQ9
SUM OF ALL RESPONSES TO PHQ9 QUESTIONS 1 AND 2: 0
2. FEELING DOWN, DEPRESSED OR HOPELESS: NOT AT ALL
1. LITTLE INTEREST OR PLEASURE IN DOING THINGS: NOT AT ALL

## 2023-11-17 ASSESSMENT — ENCOUNTER SYMPTOMS
DEPRESSION: 0
OCCASIONAL FEELINGS OF UNSTEADINESS: 0
LOSS OF SENSATION IN FEET: 0

## 2023-11-17 ASSESSMENT — PAIN SCALES - GENERAL: PAINLEVEL_OUTOF10: 6

## 2023-11-17 ASSESSMENT — PAIN DESCRIPTION - DESCRIPTORS: DESCRIPTORS: ACHING

## 2023-11-17 ASSESSMENT — PAIN - FUNCTIONAL ASSESSMENT: PAIN_FUNCTIONAL_ASSESSMENT: 0-10

## 2023-11-17 NOTE — LETTER
November 17, 2023     Patient: Simran Burroughs   YOB: 1955   Date of Visit: 11/17/2023       To Whom It May Concern:    It is my medical opinion that Simran Burroughs may return to work on 1- .    If you have any questions or concerns, please don't hesitate to call.         Sincerely,        Bhavani Forman PA-C    CC: No Recipients

## 2023-11-17 NOTE — PROGRESS NOTES
Subjective      Chief Complaint   Patient presents with    Left Shoulder - Follow-up     10/22/2023 fracture        Past Surgical History:   Procedure Laterality Date    OTHER SURGICAL HISTORY  12/13/2021    Heart surgery        HPI  This 68 year old patient presents today with left shoulder pain 4 The patient states that the left shoulder pain has been present since a fall in [place]. She hit her head but did not loose consciousness. The patient states that the left shoulder pain is worse with and aggravated by reaching and lifting. It has improved after immobilization in the left arm sling. She is slowly resuming her normal activities of daily living.     CARDIOLOGY:   Negative for chest pain, shortness of breath.   RESPIRATORY:   Negative for chest pain, shortness of breath.   MUSCULOSKELETAL:   See HPI for details.   NEUROLOGY:   Negative for tingling, numbness, weakness.    Objective      There were no vitals taken for this visit.     SHOULDER EXAM  Constitutional: Appears stated age. No apparent distress  Labored Breathing: No  Psychiatric: Normal mood and effect.   Neurological: alert and oriented x3  Skin: intact  HEENT: No bruising, otorrhea, rhinorrhea. EOM intact.   MUSCULOSKELETAL: Neck: No tenderness. No pain or limitation with range of motion. Back: No tenderness. Straight leg test negative bilaterally. left shoulder: There is no tenderness anteriorly and laterally. Patient has the left upper extremity immobilized in a sling.  Comparments are soft. Neurovascular is intact.     AP and lateral x-rays of the humerus and elbow show a comminuted fracture at the left proximal humerus.   CT Maxillofacial without contrast shows inferior left orbital wall fracture. CT of the head shows no evidence of acute fracture or intracranial process. CT cervical spine shows no acute fracture.    Simran was seen today for follow-up.  Diagnoses and all orders for this visit:  Left arm pain (Primary)  Closed 3-part fracture  of proximal humerus with routine healing, left  -     Referral to Physical Therapy; Future    Options are discussed with the patient in detail.  Continuing with an advancement in physical therapy with passive ROM. The patient is instructed regarding activity modification and risk for further injury with falling or trauma, gentle passive range of motion advancing to active assisted and active ROM, ice, and the appropriate use of Tylenol as needed for pain with its potential adverse reactions and side effects. The patient understands. Return in 3 weeks for reevaluation and niki-ray or sooner as needed.. Please note that this report has been produced using speech recognition software.  It may contain errors related to grammar, punctuation or spelling.  Electronically signed, but not reviewed.    Bhavani Forman PA-C

## 2023-11-17 NOTE — PATIENT INSTRUCTIONS
Thank you for coming to see us today!     Continue to use tylenol for pain control.   Rest, ice and elevate and remember to do gentle passive exercises.   We are going to give you a referral for physical therapy for passive ROM.     Follow up in 3 weeks

## 2023-11-20 ENCOUNTER — OFFICE VISIT (OUTPATIENT)
Dept: CARDIOLOGY | Facility: CLINIC | Age: 68
End: 2023-11-20
Payer: COMMERCIAL

## 2023-11-20 VITALS
WEIGHT: 143 LBS | HEART RATE: 74 BPM | DIASTOLIC BLOOD PRESSURE: 62 MMHG | SYSTOLIC BLOOD PRESSURE: 124 MMHG | OXYGEN SATURATION: 96 % | BODY MASS INDEX: 26.16 KG/M2

## 2023-11-20 DIAGNOSIS — I50.22 CHRONIC SYSTOLIC HEART FAILURE (MULTI): ICD-10-CM

## 2023-11-20 DIAGNOSIS — I25.10 ATHEROSCLEROSIS OF NATIVE CORONARY ARTERY OF NATIVE HEART WITHOUT ANGINA PECTORIS: Primary | ICD-10-CM

## 2023-11-20 DIAGNOSIS — I10 BENIGN HYPERTENSION: ICD-10-CM

## 2023-11-20 DIAGNOSIS — E78.00 ELEVATED CHOLESTEROL: ICD-10-CM

## 2023-11-20 PROCEDURE — 3074F SYST BP LT 130 MM HG: CPT | Performed by: INTERNAL MEDICINE

## 2023-11-20 PROCEDURE — 4010F ACE/ARB THERAPY RXD/TAKEN: CPT | Performed by: INTERNAL MEDICINE

## 2023-11-20 PROCEDURE — 1159F MED LIST DOCD IN RCRD: CPT | Performed by: INTERNAL MEDICINE

## 2023-11-20 PROCEDURE — 1036F TOBACCO NON-USER: CPT | Performed by: INTERNAL MEDICINE

## 2023-11-20 PROCEDURE — 1126F AMNT PAIN NOTED NONE PRSNT: CPT | Performed by: INTERNAL MEDICINE

## 2023-11-20 PROCEDURE — 3066F NEPHROPATHY DOC TX: CPT | Performed by: INTERNAL MEDICINE

## 2023-11-20 PROCEDURE — 1160F RVW MEDS BY RX/DR IN RCRD: CPT | Performed by: INTERNAL MEDICINE

## 2023-11-20 PROCEDURE — 3078F DIAST BP <80 MM HG: CPT | Performed by: INTERNAL MEDICINE

## 2023-11-20 PROCEDURE — 99213 OFFICE O/P EST LOW 20 MIN: CPT | Performed by: INTERNAL MEDICINE

## 2023-11-20 ASSESSMENT — ENCOUNTER SYMPTOMS
OCCASIONAL FEELINGS OF UNSTEADINESS: 0
DEPRESSION: 0
NEUROLOGICAL NEGATIVE: 1
RESPIRATORY NEGATIVE: 1
GASTROINTESTINAL NEGATIVE: 1
ENDOCRINE NEGATIVE: 1
LOSS OF SENSATION IN FEET: 0
CONSTITUTIONAL NEGATIVE: 1
EYES NEGATIVE: 1

## 2023-11-20 ASSESSMENT — PAIN SCALES - GENERAL: PAINLEVEL: 0-NO PAIN

## 2023-11-20 ASSESSMENT — PATIENT HEALTH QUESTIONNAIRE - PHQ9
SUM OF ALL RESPONSES TO PHQ9 QUESTIONS 1 & 2: 0
1. LITTLE INTEREST OR PLEASURE IN DOING THINGS: NOT AT ALL
2. FEELING DOWN, DEPRESSED OR HOPELESS: NOT AT ALL

## 2023-11-20 NOTE — PROGRESS NOTES
Subjective      Chief Complaint   Patient presents with    6 MONTH FOLLOW UP     Atherosclerotic heart disease of native coronary artery without angina pectoris          She feel and hurt her left arm.  Is active.  She is not complaining of chest discomfort.  No complaints of PND or orthopnea.  The legs are not swelling on her.  NO palpitaions.  The BP is doing well  The chol was done a year ago and is low  The OHS is 9 years ago           Review of Systems   Constitutional: Negative.   HENT: Negative.     Eyes: Negative.    Respiratory: Negative.     Endocrine: Negative.    Skin: Negative.    Musculoskeletal:  Positive for joint pain.   Gastrointestinal: Negative.    Genitourinary: Negative.    Neurological: Negative.    All other systems reviewed and are negative.       Past Surgical History:   Procedure Laterality Date    OTHER SURGICAL HISTORY  12/13/2021    Heart surgery        Active Ambulatory Problems     Diagnosis Date Noted    Impaired cardiac function 08/25/2023    Cardiac rhythm disorder or disturbance or change 08/25/2023    Knee pain 08/25/2023    Adjustment reaction with anxiety 09/21/2020    Adjustment reaction with anxiety and depression 12/16/2019    Anxiety 11/11/2021    Atherosclerotic heart disease of native coronary artery without angina pectoris 08/25/2023    Benign hypertension 08/25/2023    Blood glucose abnormal 08/25/2023    Charcot's arthropathy associated with type 2 diabetes mellitus (CMS/[facility]) 08/25/2023    Chronic kidney disease, stage 2 (mild) 08/25/2023    Depression, recurrent (CMS/[facility]) 11/26/2019    Situational depression 05/26/2022    DM (diabetes mellitus) (CMS/[facility]) 08/25/2023    Type 2 diabetes mellitus with diabetic nephropathy (CMS/[facility]) 08/25/2023    Type 2 diabetes mellitus with mild nonproliferative diabetic retinopathy without macular edema, bilateral (CMS/[facility]) 08/25/2023    Heart failure (CMS/[facility]) 08/25/2023    Hyperglycemia due to type 2 diabetes mellitus (CMS/[facility])  08/25/2023    Elevated cholesterol 08/25/2023    Hyperlipemia, mixed 08/25/2023    Injury of knee 08/25/2023    Injury of toe 08/25/2023    Intermittent vertigo 09/21/2020    Low magnesium levels 11/11/2021    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/25/2023    Neck pain on right side 11/26/2019    Post-menopause bleeding 05/26/2022    Rib pain 08/25/2023    Right shoulder pain 11/26/2019    S/P coronary artery bypass graft x 3 08/25/2023    Snores 11/11/2021    Tinnitus of both ears 08/25/2023    Vitamin B 12 deficiency 11/11/2021    Vitamin D deficiency 11/11/2021    Left elbow pain 10/31/2023    Left arm pain 10/31/2023    Closed 3-part fracture of proximal humerus with routine healing, left 10/31/2023     Resolved Ambulatory Problems     Diagnosis Date Noted    Lab test positive for detection of COVID-19 virus 11/23/2020     Past Medical History:   Diagnosis Date    Fracture, humerus     Left shoulder pain     Other specified diabetes mellitus without complications (CMS/HCC)     Personal history of other diseases of the circulatory system         Visit Vitals  /62   Pulse 74   Wt 64.9 kg (143 lb)   SpO2 96%   BMI 26.16 kg/m²   Smoking Status Never   BSA 1.69 m²        Objective     Constitutional:       Appearance: Healthy appearance.   Eyes:      Pupils: Pupils are equal, round, and reactive to light.   Neck:      Vascular: JVD normal.   Pulmonary:      Breath sounds: Normal breath sounds.   Cardiovascular:      PMI at left midclavicular line. Normal rate. Regular rhythm. Normal S1. Normal S2.       Murmurs: There is no murmur.      No gallop.  No click. No rub.   Pulses:     Intact distal pulses.   Edema:     Peripheral edema absent.   Abdominal:      Palpations: Abdomen is soft.      Tenderness: There is no abdominal tenderness.   Musculoskeletal:      Extremities: No clubbing present.Skin:     General: Skin is warm and dry.   Neurological:      General: No focal deficit present.  "           Lab Review:         Lab Results   Component Value Date    CHOL 167 09/23/2022    CHOL 147 08/03/2021    CHOL 166 02/19/2019     Lab Results   Component Value Date    HDL 37 (L) 09/23/2022    HDL 36 (L) 08/03/2021    HDL 39 (L) 02/19/2019     Lab Results   Component Value Date    LDLCALC 93 09/23/2022    LDLCALC 77 08/03/2021    LDLCALC 103 02/19/2019     Lab Results   Component Value Date    TRIG 186 (H) 09/23/2022    TRIG 170 (H) 08/03/2021    TRIG 120 02/19/2019     No components found for: \"CHOLHDL\"     Assessment/Plan     No problem-specific Assessment & Plan notes found for this encounter.     "

## 2023-11-29 ENCOUNTER — EVALUATION (OUTPATIENT)
Dept: PHYSICAL THERAPY | Facility: CLINIC | Age: 68
End: 2023-11-29
Payer: COMMERCIAL

## 2023-11-29 DIAGNOSIS — S42.292D CLOSED 3-PART FRACTURE OF PROXIMAL HUMERUS WITH ROUTINE HEALING, LEFT: Primary | ICD-10-CM

## 2023-11-29 PROCEDURE — 97110 THERAPEUTIC EXERCISES: CPT | Mod: GP

## 2023-11-29 PROCEDURE — 97161 PT EVAL LOW COMPLEX 20 MIN: CPT | Mod: GP

## 2023-11-29 ASSESSMENT — ENCOUNTER SYMPTOMS
OCCASIONAL FEELINGS OF UNSTEADINESS: 0
LOSS OF SENSATION IN FEET: 1
DEPRESSION: 0

## 2023-11-29 ASSESSMENT — PAIN SCALES - GENERAL: PAINLEVEL_OUTOF10: 9

## 2023-11-29 ASSESSMENT — PAIN - FUNCTIONAL ASSESSMENT: PAIN_FUNCTIONAL_ASSESSMENT: 0-10

## 2023-11-29 NOTE — PROGRESS NOTES
Physical Therapy    Physical Therapy Evaluation and Treatment      Patient Name: Simran Burroughs  MRN: [id number]  Today's Date: 11/29/2023  Time Calculation  Start Time: 1000  Stop Time: 1040  Time Calculation (min): 40 min    Assessment:  PT Assessment  PT Assessment Results: Decreased strength, Decreased range of motion, Pain  Rehab Prognosis: Good  Evaluation/Treatment Tolerance: Patient tolerated treatment well  Strengths: Ability to acquire knowledge  Assessment Comment: Pt is a 67 y/o female with loss of ROM and strength left shoulder due to recent proximal humerus fracture.  Pt would benefit from skiled PT to increase ROM and strength in order to return to PLOF     Plan:  OP PT Plan  Treatment/Interventions: Cryotherapy, Education/ Instruction, Manual therapy, Self care/ home management, Taping techniques, Therapeutic activities, Therapeutic exercises  PT Plan: Skilled PT  PT Frequency: 2 times per week  Duration: 8 weeks or 16 visits  Onset Date: 11/29/23  Number of Treatments Authorized: 40  Rehab Potential: Good  Plan of Care Agreement: Patient    Current Problem:   1. Closed 3-part fracture of proximal humerus with routine healing, left  Referral to Physical Therapy    Follow Up In Physical Therapy          Subjective    On October 22, 2023 pt fell off a dock and fractured left proximal humerus.  No surgical intervention. Pt has been in a sling since injury.      General  Reason for Referral: fracture of left proximal humerus  Referred By: Dr. Hernandez  Past Medical History Relevant to Rehab: DM, HTN, heart disease, neuropathy    Precautions  STEADI Fall Risk Score (The score of 4 or more indicates an increased risk of falling): 4  Precautions Comment: left proximal humerus fracture 10/22/2023     Pain Assessment  Pain Assessment: 0-10  Pain Score: 9  Pain Location: Shoulder  Pain Orientation: Left  Pt reports intermittent pain in left shoulder/upper arm,usually with movement.  Also reports difficulty  getting comfortable at night to sleep.    Home Living:   Lives with     Prior Level of Function:   Pt is right handed  Works full time in [employer]  Likes to do crafts    Objective                              AROM   PROM                    Strength                              Right        Left               Right     Left    Sh flex          153deg        86deg          5/5       n/t         Ext              53deg         n/t              5/5       n/t         Abd         157deg         55deg          5/5       n/t         ER                                   5deg          5/5       n/t         IR                                                       5/5      n/t    Outcome Measures:  Other Measures  Other Outcome Measures: SPADI 110/130     Treatments:  Therapeutic exercise (15min)    Pt instructed in and performed:  self-assist flex in supine, ER with wand in supine, scap retraction, table slides for flex. Handouts provided    EDUCATION:  Outpatient Education  Individual(s) Educated: Patient  Education Provided: POC, Home Exercise Program  Risk and Benefits Discussed with Patient/Caregiver/Other: yes  Patient/Caregiver Demonstrated Understanding: yes  Plan of Care Discussed and Agreed Upon: yes  Patient Response to Education: Patient/Caregiver Verbalized Understanding of Information, Patient/Caregiver Performed Return Demonstration of Exercises/Activities    Goals:  Active       PT Problem       PT Goal 1       Start:  11/29/23    Expected End:  12/29/23       Pt independent with HEP         PT Goal 2       Start:  11/29/23    Expected End:  12/29/23       Increase ROM left shoulder flex and abd to at least 140 deg to allow pt to reach in cupboard         PT Goal 3       Start:  11/29/23    Expected End:  01/28/24       Improve SPADI to no more than 25% impairment         PT Goal 4       Start:  11/29/23    Expected End:  01/28/24       Increase strength left shoulder to 4+/5 to allow pt to perform  household chores without difficulty         PT Goal 5       Start:  11/29/23    Expected End:  01/28/24       Improve left sh ER to at least 50 deg to allow pt to perform ADLs without difficulty

## 2023-12-04 ENCOUNTER — PHARMACY VISIT (OUTPATIENT)
Dept: PHARMACY | Facility: CLINIC | Age: 68
End: 2023-12-04
Payer: COMMERCIAL

## 2023-12-04 PROCEDURE — RXMED WILLOW AMBULATORY MEDICATION CHARGE

## 2023-12-08 ENCOUNTER — HOSPITAL ENCOUNTER (OUTPATIENT)
Dept: RADIOLOGY | Facility: CLINIC | Age: 68
Discharge: HOME | End: 2023-12-08
Payer: COMMERCIAL

## 2023-12-08 ENCOUNTER — OFFICE VISIT (OUTPATIENT)
Dept: ORTHOPEDIC SURGERY | Facility: CLINIC | Age: 68
End: 2023-12-08
Payer: COMMERCIAL

## 2023-12-08 VITALS — WEIGHT: 143.08 LBS | BODY MASS INDEX: 26.17 KG/M2

## 2023-12-08 DIAGNOSIS — S42.292D CLOSED 3-PART FRACTURE OF PROXIMAL HUMERUS WITH ROUTINE HEALING, LEFT: ICD-10-CM

## 2023-12-08 DIAGNOSIS — M25.519 SHOULDER PAIN: ICD-10-CM

## 2023-12-08 DIAGNOSIS — M79.602 LEFT ARM PAIN: Primary | ICD-10-CM

## 2023-12-08 PROCEDURE — 73030 X-RAY EXAM OF SHOULDER: CPT | Mod: LEFT SIDE | Performed by: ORTHOPAEDIC SURGERY

## 2023-12-08 PROCEDURE — 4010F ACE/ARB THERAPY RXD/TAKEN: CPT | Performed by: PHYSICIAN ASSISTANT

## 2023-12-08 PROCEDURE — 1160F RVW MEDS BY RX/DR IN RCRD: CPT | Performed by: PHYSICIAN ASSISTANT

## 2023-12-08 PROCEDURE — 1159F MED LIST DOCD IN RCRD: CPT | Performed by: PHYSICIAN ASSISTANT

## 2023-12-08 PROCEDURE — 3066F NEPHROPATHY DOC TX: CPT | Performed by: PHYSICIAN ASSISTANT

## 2023-12-08 PROCEDURE — 73030 X-RAY EXAM OF SHOULDER: CPT | Mod: LT,FY

## 2023-12-08 PROCEDURE — 99024 POSTOP FOLLOW-UP VISIT: CPT | Performed by: PHYSICIAN ASSISTANT

## 2023-12-08 PROCEDURE — 1036F TOBACCO NON-USER: CPT | Performed by: PHYSICIAN ASSISTANT

## 2023-12-08 PROCEDURE — 1126F AMNT PAIN NOTED NONE PRSNT: CPT | Performed by: PHYSICIAN ASSISTANT

## 2023-12-08 ASSESSMENT — PAIN - FUNCTIONAL ASSESSMENT: PAIN_FUNCTIONAL_ASSESSMENT: 0-10

## 2023-12-08 ASSESSMENT — PAIN SCALES - GENERAL: PAINLEVEL_OUTOF10: 4

## 2023-12-08 ASSESSMENT — ENCOUNTER SYMPTOMS
LOSS OF SENSATION IN FEET: 0
OCCASIONAL FEELINGS OF UNSTEADINESS: 0
DEPRESSION: 0

## 2023-12-08 ASSESSMENT — PAIN DESCRIPTION - DESCRIPTORS: DESCRIPTORS: SHARP

## 2023-12-08 ASSESSMENT — LIFESTYLE VARIABLES: TOTAL SCORE: 0

## 2023-12-08 NOTE — PATIENT INSTRUCTIONS
Thank you for coming to see us today!     Continue to use tylenol for pain control.   Rest, ice and elevate and remember to do gentle passive exercises.   We are going to give you a referral for physical therapy for active ROM.     Follow up in February or sooner as needed.

## 2023-12-08 NOTE — LETTER
December 8, 2023     Patient: Simran Burroughs   YOB: 1955   Date of Visit: 12/8/2023       To Whom It May Concern:    It is my medical opinion that Simran Burroughs  may return to work on light duty on December 18th 2023 .    If you have any questions or concerns, please don't hesitate to call.         Sincerely,        Bhavani Forman PA-C    CC: No Recipients

## 2023-12-08 NOTE — PROGRESS NOTES
Subjective      Chief Complaint   Patient presents with    Left Shoulder - Follow-up        Past Surgical History:   Procedure Laterality Date    OTHER SURGICAL HISTORY  12/13/2021    Heart surgery        HPI  This 68 year old patient presents today with left shoulder pain. The patient states that the left shoulder pain has been present since a fall in [place]. She hit her head but did not loose consciousness. The patient states that the left shoulder pain is worse with and aggravated by reaching and lifting. It has improved after working with physical therapy with passive ROM. She is slowly resuming her normal activities of daily living.     CARDIOLOGY:   Negative for chest pain, shortness of breath.   RESPIRATORY:   Negative for chest pain, shortness of breath.   MUSCULOSKELETAL:   See HPI for details.   NEUROLOGY:   Negative for tingling, numbness, weakness.    Objective      There were no vitals taken for this visit.     SHOULDER EXAM  Constitutional: Appears stated age. No apparent distress  Labored Breathing: No  Psychiatric: Normal mood and effect.   Neurological: alert and oriented x3  Skin: intact  HEENT: No bruising, otorrhea, rhinorrhea. EOM intact.   MUSCULOSKELETAL: Neck: No tenderness. No pain or limitation with range of motion. Back: No tenderness. Straight leg test negative bilaterally. left shoulder: There is no tenderness anteriorly and laterally. Patient is able to complete passive ROM from 0-110 degrees. Active ROM in flexion and abduction from 0-90 degrees.  Comparments are soft. Neurovascular is intact.     AP and lateral x-rays of the humerus and elbow show a comminuted fracture at the left proximal humerus.   CT Maxillofacial without contrast shows inferior left orbital wall fracture. CT of the head shows no evidence of acute fracture or intracranial process. CT cervical spine shows no acute fracture.    Simran was seen today for follow-up.  Diagnoses and all orders for this visit:  Left arm  pain (Primary)  Closed 3-part fracture of proximal humerus with routine healing, left    Options are discussed with the patient in detail.  Continuing with an advancement in physical therapy with active ROM. The patient is instructed regarding activity modification and risk for further injury with falling or trauma, gentle passive range of motion advancing to active assisted and active ROM, ice, and the appropriate use of Tylenol as needed for pain with its potential adverse reactions and side effects. The patient understands. Return in February  for reevaluation and niki-ray or sooner as needed.. Please note that this report has been produced using speech recognition software.  It may contain errors related to grammar, punctuation or spelling.  Electronically signed, but not reviewed.    Bhavani Forman PA-C

## 2023-12-12 ENCOUNTER — TREATMENT (OUTPATIENT)
Dept: PHYSICAL THERAPY | Facility: CLINIC | Age: 68
End: 2023-12-12
Payer: COMMERCIAL

## 2023-12-12 DIAGNOSIS — S42.292D CLOSED 3-PART FRACTURE OF PROXIMAL HUMERUS WITH ROUTINE HEALING, LEFT: ICD-10-CM

## 2023-12-12 PROCEDURE — 97110 THERAPEUTIC EXERCISES: CPT | Mod: GP,CQ

## 2023-12-12 NOTE — PROGRESS NOTES
Physical Therapy Treatment    Patient Name: Simran Burroughs  MRN: [id number]  Today's Date: 12/12/2023  Time Calculation  Start Time: 0330  Stop Time: 0400  Time Calculation (min): 30 min  PT Therapeutic Procedures Time Entry  Therapeutic Exercise Time Entry: 30  Visit # 2/16    Current Problem   1. Closed 3-part fracture of proximal humerus with routine healing, left  Follow Up In Physical Therapy          Subjective   General    Pt reports that her pain levels have decreased and she is moving her arm better.  Precautions:     Pain   4/10   Post Treatment Pain Level 4/10    Objective   L Gh PROM flexion at 135, ER 45     Treatments:   UBE x 3 retro  Scap add with LA's with GTB x 20  SPO x 20  S/L ER x 20  S/L ABD x 20  S/L flexion x 20  Self assist flexion x 20  Bicep curls  Table slides for flexion    Assessment   Assessment:    Pt tolerated session well.     Plan:    Continue per protocol.      Goals:   Active       PT Problem       PT Goal 1 (Progressing)       Start:  11/29/23    Expected End:  12/29/23       Pt independent with HEP         PT Goal 2 (Progressing)       Start:  11/29/23    Expected End:  12/29/23       Increase ROM left shoulder flex and abd to at least 140 deg to allow pt to reach in cupboard         PT Goal 3 (Progressing)       Start:  11/29/23    Expected End:  01/28/24       Improve SPADI to no more than 25% impairment         PT Goal 4 (Progressing)       Start:  11/29/23    Expected End:  01/28/24       Increase strength left shoulder to 4+/5 to allow pt to perform household chores without difficulty         PT Goal 5 (Progressing)       Start:  11/29/23    Expected End:  01/28/24       Improve left sh ER to at least 50 deg to allow pt to perform ADLs without difficulty

## 2023-12-18 ENCOUNTER — TREATMENT (OUTPATIENT)
Dept: PHYSICAL THERAPY | Facility: CLINIC | Age: 68
End: 2023-12-18
Payer: COMMERCIAL

## 2023-12-18 DIAGNOSIS — S42.292D CLOSED 3-PART FRACTURE OF PROXIMAL HUMERUS WITH ROUTINE HEALING, LEFT: ICD-10-CM

## 2023-12-18 PROCEDURE — 97110 THERAPEUTIC EXERCISES: CPT | Mod: GP,CQ

## 2023-12-18 NOTE — PROGRESS NOTES
Physical Therapy Treatment    Patient Name: Simran Burroughs  MRN: [id number]  Today's Date: 12/18/2023  Time Calculation  Start Time: 1015  Stop Time: 1045  Time Calculation (min): 30 min  PT Therapeutic Procedures Time Entry  Therapeutic Exercise Time Entry: 30  Visit # 3/16    Current Problem   1. Closed 3-part fracture of proximal humerus with routine healing, left  Follow Up In Physical Therapy          Subjective   General    Pt starts back to light duty at work today.  Precautions:   No heavy lifting.  Pain    0/10  Post Treatment Pain Level 1/10    Objective   L shoulder flexion to 145 AAROM, ER 50    Treatments:    UBE x 3 retro  Scap add with LA's with GTB x 20  SPO x 20 #1  S/L ER x 20 #1  S/L ABD x 20 #1  Supine flexion x 20 #1  Bicep curls #3  Table slides for flexion      Assessment   Assessment:    Pt able to tolerate increases in weight for there ex.  ROM is improving.  Plan:    Progress per POC as able.        Goals:   Active       PT Problem       PT Goal 1 (Progressing)       Start:  11/29/23    Expected End:  12/29/23       Pt independent with HEP         PT Goal 2 (Progressing)       Start:  11/29/23    Expected End:  12/29/23       Increase ROM left shoulder flex and abd to at least 140 deg to allow pt to reach in cupboard         PT Goal 3 (Progressing)       Start:  11/29/23    Expected End:  01/28/24       Improve SPADI to no more than 25% impairment         PT Goal 4 (Progressing)       Start:  11/29/23    Expected End:  01/28/24       Increase strength left shoulder to 4+/5 to allow pt to perform household chores without difficulty         PT Goal 5 (Progressing)       Start:  11/29/23    Expected End:  01/28/24       Improve left sh ER to at least 50 deg to allow pt to perform ADLs without difficulty                SBIRT referral

## 2023-12-22 ENCOUNTER — TREATMENT (OUTPATIENT)
Dept: PHYSICAL THERAPY | Facility: CLINIC | Age: 68
End: 2023-12-22
Payer: COMMERCIAL

## 2023-12-22 DIAGNOSIS — S42.292D CLOSED 3-PART FRACTURE OF PROXIMAL HUMERUS WITH ROUTINE HEALING, LEFT: ICD-10-CM

## 2023-12-22 PROCEDURE — 97110 THERAPEUTIC EXERCISES: CPT | Mod: GP,CQ

## 2023-12-22 NOTE — PROGRESS NOTES
Physical Therapy Treatment    Patient Name: Simran Burroughs  MRN: [id number]  Today's Date: 12/22/2023     Visit # 4/16    Current Problem   1. Closed 3-part fracture of proximal humerus with routine healing, left  Follow Up In Physical Therapy          Subjective   General    Pt has no complaints  Precautions:     Pain    0/10  Post Treatment Pain Level 1/10    Objective   AROM L shoulder : flex 135,     Treatments:   UBE x 3 retro  Scap add with LA's with GTB x 20  GH extensions with GTB x 20  SPO x 20 #2  S/L ER 3 x 10 #1  S/L ABD 2 x 10 #1  Supine flexion x 20 #1  Bicep curls #3 2  x 15  BOSU pushes  Table slides for flexion      Assessment   Assessment:    Pt is progressing well per protocol.    Plan:    Progress strengthening and ROM as tolerated      Goals:

## 2023-12-26 DIAGNOSIS — I10 BENIGN HYPERTENSION: Primary | ICD-10-CM

## 2023-12-27 ENCOUNTER — APPOINTMENT (OUTPATIENT)
Dept: PHYSICAL THERAPY | Facility: CLINIC | Age: 68
End: 2023-12-27
Payer: COMMERCIAL

## 2023-12-27 RX ORDER — LOSARTAN POTASSIUM 100 MG/1
100 TABLET ORAL DAILY
Qty: 90 TABLET | Refills: 3 | Status: SHIPPED | OUTPATIENT
Start: 2023-12-27 | End: 2024-12-26

## 2023-12-29 ENCOUNTER — OFFICE VISIT (OUTPATIENT)
Dept: PRIMARY CARE | Facility: CLINIC | Age: 68
End: 2023-12-29
Payer: COMMERCIAL

## 2023-12-29 ENCOUNTER — APPOINTMENT (OUTPATIENT)
Dept: PHYSICAL THERAPY | Facility: CLINIC | Age: 68
End: 2023-12-29
Payer: COMMERCIAL

## 2023-12-29 VITALS
HEART RATE: 73 BPM | DIASTOLIC BLOOD PRESSURE: 78 MMHG | HEIGHT: 62 IN | WEIGHT: 140 LBS | BODY MASS INDEX: 25.76 KG/M2 | OXYGEN SATURATION: 97 % | SYSTOLIC BLOOD PRESSURE: 130 MMHG

## 2023-12-29 DIAGNOSIS — Z76.89 ENCOUNTER TO ESTABLISH CARE: Primary | ICD-10-CM

## 2023-12-29 DIAGNOSIS — F43.23 ADJUSTMENT REACTION WITH ANXIETY AND DEPRESSION: ICD-10-CM

## 2023-12-29 PROCEDURE — 1036F TOBACCO NON-USER: CPT

## 2023-12-29 PROCEDURE — 3075F SYST BP GE 130 - 139MM HG: CPT

## 2023-12-29 PROCEDURE — 4010F ACE/ARB THERAPY RXD/TAKEN: CPT

## 2023-12-29 PROCEDURE — 1160F RVW MEDS BY RX/DR IN RCRD: CPT

## 2023-12-29 PROCEDURE — 1159F MED LIST DOCD IN RCRD: CPT

## 2023-12-29 PROCEDURE — 3066F NEPHROPATHY DOC TX: CPT

## 2023-12-29 PROCEDURE — 99213 OFFICE O/P EST LOW 20 MIN: CPT

## 2023-12-29 PROCEDURE — 3078F DIAST BP <80 MM HG: CPT

## 2023-12-29 PROCEDURE — 1126F AMNT PAIN NOTED NONE PRSNT: CPT

## 2023-12-29 ASSESSMENT — PATIENT HEALTH QUESTIONNAIRE - PHQ9
1. LITTLE INTEREST OR PLEASURE IN DOING THINGS: NOT AT ALL
2. FEELING DOWN, DEPRESSED OR HOPELESS: NOT AT ALL
SUM OF ALL RESPONSES TO PHQ9 QUESTIONS 1 AND 2: 0

## 2023-12-29 ASSESSMENT — ENCOUNTER SYMPTOMS
DIZZINESS: 0
FATIGUE: 0
LIGHT-HEADEDNESS: 0
PALPITATIONS: 0
SHORTNESS OF BREATH: 0

## 2023-12-29 ASSESSMENT — PAIN SCALES - GENERAL: PAINLEVEL: 0-NO PAIN

## 2023-12-29 NOTE — PROGRESS NOTES
"Subjective   Patient ID: Simran Burroughs is a 68 y.o. female who presents for Establish Care (Pt is here to establish care with no concerns /la).    Hx IDDM, CAD, ASCVD, HLD, HTN,     Other providers: Previous PCP Dr. Hansen,   Dr. Gross (cardiology), Margi Laughlin CNP (endocrinologist), Dr. Angel Spring (ophthalmologist)    All medications besides Lexapro prescribed through specialist.     Depression with anxiety: controlled 10 mg Lexapro. Has been on for years. Denies SI.        Review of Systems   Constitutional:  Negative for fatigue.   Eyes:  Negative for visual disturbance.   Respiratory:  Negative for shortness of breath.    Cardiovascular:  Negative for chest pain, palpitations and leg swelling.   Neurological:  Negative for dizziness, syncope and light-headedness.       Objective   /78   Pulse 73   Ht 1.575 m (5' 2\")   Wt 63.5 kg (140 lb)   SpO2 97%   BMI 25.61 kg/m²     Physical Exam  Constitutional:       General: She is not in acute distress.     Appearance: Normal appearance.   Cardiovascular:      Rate and Rhythm: Normal rate and regular rhythm.      Heart sounds: No murmur heard.  Pulmonary:      Effort: Pulmonary effort is normal.      Breath sounds: Normal breath sounds. No wheezing, rhonchi or rales.   Musculoskeletal:      Right lower leg: No edema.      Left lower leg: No edema.   Skin:     General: Skin is warm and dry.      Findings: No rash.   Neurological:      Mental Status: She is alert.   Psychiatric:         Mood and Affect: Mood and affect normal.         Assessment/Plan   Diagnoses and all orders for this visit:  Encounter to establish care  Adjustment reaction with anxiety and depression  -Follow-up in 6 months from last Dr. Hansen appt for CPE.        "

## 2024-01-02 ENCOUNTER — TREATMENT (OUTPATIENT)
Dept: PHYSICAL THERAPY | Facility: CLINIC | Age: 69
End: 2024-01-02
Payer: COMMERCIAL

## 2024-01-02 DIAGNOSIS — S42.292D CLOSED 3-PART FRACTURE OF PROXIMAL HUMERUS WITH ROUTINE HEALING, LEFT: ICD-10-CM

## 2024-01-02 PROCEDURE — 97110 THERAPEUTIC EXERCISES: CPT | Mod: GP,CQ

## 2024-01-02 NOTE — PROGRESS NOTES
Physical Therapy Treatment    Patient Name: Simran Burroughs  MRN: [id number]  Today's Date: 1/2/2024  Time Calculation  Start Time: 0345  Stop Time: 0415  Time Calculation (min): 30 min  PT Therapeutic Procedures Time Entry  Therapeutic Exercise Time Entry: 30  Visit # 5/16    Current Problem   1. Closed 3-part fracture of proximal humerus with routine healing, left  Follow Up In Physical Therapy          Subjective   General    Pt reports that she is doing well. Plans on heading out of town for a couple weeks and will report back when she returns.  Precautions:     Pain    2/10  Post Treatment Pain Level same    Objective     L shoulder AAROM to 151 for flexion.  Treatments:    UBE x 3 retro  Scap add with LA's with BTB x 20  GH extensions with BTB x 20  SPO x 20 #2  S/L ER 3 x 10 #2  S/L ABD 2 x 15 #1  Supine flexion 2 x 15 #1  Bicep curls #3 2  x 15  BOSU pushes 2 x 10  Table slides for flexion      Assessment   Assessment:    Pt is progressing fairly well. Has MD F/U in 2 weeks.     Plan:    Progress ROM and strengthening as tolerated.      Goals:   Active       PT Problem       PT Goal 1 (Progressing)       Start:  11/29/23    Expected End:  12/29/23       Pt independent with HEP         PT Goal 2 (Progressing)       Start:  11/29/23    Expected End:  12/29/23       Increase ROM left shoulder flex and abd to at least 140 deg to allow pt to reach in cupboard         PT Goal 3 (Progressing)       Start:  11/29/23    Expected End:  01/28/24       Improve SPADI to no more than 25% impairment         PT Goal 4 (Progressing)       Start:  11/29/23    Expected End:  01/28/24       Increase strength left shoulder to 4+/5 to allow pt to perform household chores without difficulty         PT Goal 5 (Progressing)       Start:  11/29/23    Expected End:  01/28/24       Improve left sh ER to at least 50 deg to allow pt to perform ADLs without difficulty

## 2024-01-05 ENCOUNTER — APPOINTMENT (OUTPATIENT)
Dept: PHYSICAL THERAPY | Facility: CLINIC | Age: 69
End: 2024-01-05
Payer: COMMERCIAL

## 2024-01-09 ENCOUNTER — APPOINTMENT (OUTPATIENT)
Dept: PHYSICAL THERAPY | Facility: CLINIC | Age: 69
End: 2024-01-09
Payer: COMMERCIAL

## 2024-01-11 DIAGNOSIS — E11.21 TYPE 2 DIABETES MELLITUS WITH DIABETIC NEPHROPATHY, UNSPECIFIED WHETHER LONG TERM INSULIN USE (MULTI): Primary | ICD-10-CM

## 2024-01-11 PROCEDURE — RXMED WILLOW AMBULATORY MEDICATION CHARGE

## 2024-01-11 RX ORDER — BLOOD-GLUCOSE SENSOR
EACH MISCELLANEOUS
Qty: 2 EACH | Refills: 11 | Status: SHIPPED | OUTPATIENT
Start: 2024-01-11 | End: 2025-01-10

## 2024-01-12 ENCOUNTER — APPOINTMENT (OUTPATIENT)
Dept: PHYSICAL THERAPY | Facility: CLINIC | Age: 69
End: 2024-01-12
Payer: COMMERCIAL

## 2024-01-16 PROCEDURE — RXMED WILLOW AMBULATORY MEDICATION CHARGE

## 2024-01-17 ENCOUNTER — DOCUMENTATION (OUTPATIENT)
Dept: PHYSICAL THERAPY | Facility: CLINIC | Age: 69
End: 2024-01-17

## 2024-01-17 ENCOUNTER — PHARMACY VISIT (OUTPATIENT)
Dept: PHARMACY | Facility: CLINIC | Age: 69
End: 2024-01-17
Payer: COMMERCIAL

## 2024-01-17 PROCEDURE — RXMED WILLOW AMBULATORY MEDICATION CHARGE

## 2024-01-17 NOTE — PROGRESS NOTES
Physical Therapy                 Therapy Communication Note    Patient Name: Simran Burroughs  MRN: [id number]  Today's Date: 1/17/2024     Discipline: Physical Therapy    Missed Visit Reason:      Missed Time: No Show    Comment:

## 2024-01-25 ENCOUNTER — OFFICE VISIT (OUTPATIENT)
Dept: ENDOCRINOLOGY | Facility: CLINIC | Age: 69
End: 2024-01-25
Payer: COMMERCIAL

## 2024-01-25 VITALS — WEIGHT: 142 LBS | BODY MASS INDEX: 25.97 KG/M2 | SYSTOLIC BLOOD PRESSURE: 134 MMHG | DIASTOLIC BLOOD PRESSURE: 78 MMHG

## 2024-01-25 DIAGNOSIS — R53.83 OTHER FATIGUE: ICD-10-CM

## 2024-01-25 DIAGNOSIS — E78.2 HYPERLIPEMIA, MIXED: ICD-10-CM

## 2024-01-25 DIAGNOSIS — E53.8 VITAMIN B12 DEFICIENCY: ICD-10-CM

## 2024-01-25 DIAGNOSIS — E11.42 TYPE 2 DIABETES MELLITUS WITH DIABETIC POLYNEUROPATHY, WITH LONG-TERM CURRENT USE OF INSULIN (MULTI): Primary | ICD-10-CM

## 2024-01-25 DIAGNOSIS — Z79.4 TYPE 2 DIABETES MELLITUS WITH DIABETIC POLYNEUROPATHY, WITH LONG-TERM CURRENT USE OF INSULIN (MULTI): Primary | ICD-10-CM

## 2024-01-25 DIAGNOSIS — I10 BENIGN HYPERTENSION: ICD-10-CM

## 2024-01-25 LAB — POC HEMOGLOBIN A1C: 7.2 % (ref 4.2–6.5)

## 2024-01-25 PROCEDURE — 3078F DIAST BP <80 MM HG: CPT | Performed by: NURSE PRACTITIONER

## 2024-01-25 PROCEDURE — 95251 CONT GLUC MNTR ANALYSIS I&R: CPT | Performed by: NURSE PRACTITIONER

## 2024-01-25 PROCEDURE — 1126F AMNT PAIN NOTED NONE PRSNT: CPT | Performed by: NURSE PRACTITIONER

## 2024-01-25 PROCEDURE — 4010F ACE/ARB THERAPY RXD/TAKEN: CPT | Performed by: NURSE PRACTITIONER

## 2024-01-25 PROCEDURE — 1036F TOBACCO NON-USER: CPT | Performed by: NURSE PRACTITIONER

## 2024-01-25 PROCEDURE — 1159F MED LIST DOCD IN RCRD: CPT | Performed by: NURSE PRACTITIONER

## 2024-01-25 PROCEDURE — 3075F SYST BP GE 130 - 139MM HG: CPT | Performed by: NURSE PRACTITIONER

## 2024-01-25 PROCEDURE — 1160F RVW MEDS BY RX/DR IN RCRD: CPT | Performed by: NURSE PRACTITIONER

## 2024-01-25 PROCEDURE — 99214 OFFICE O/P EST MOD 30 MIN: CPT | Performed by: NURSE PRACTITIONER

## 2024-01-25 PROCEDURE — 83036 HEMOGLOBIN GLYCOSYLATED A1C: CPT | Performed by: NURSE PRACTITIONER

## 2024-01-25 PROCEDURE — 3066F NEPHROPATHY DOC TX: CPT | Performed by: NURSE PRACTITIONER

## 2024-01-25 ASSESSMENT — PATIENT HEALTH QUESTIONNAIRE - PHQ9
2. FEELING DOWN, DEPRESSED OR HOPELESS: NOT AT ALL
SUM OF ALL RESPONSES TO PHQ9 QUESTIONS 1 AND 2: 0
1. LITTLE INTEREST OR PLEASURE IN DOING THINGS: NOT AT ALL

## 2024-01-25 ASSESSMENT — ENCOUNTER SYMPTOMS
OCCASIONAL FEELINGS OF UNSTEADINESS: 0
DEPRESSION: 0
LOSS OF SENSATION IN FEET: 0

## 2024-01-25 ASSESSMENT — PAIN SCALES - GENERAL: PAINLEVEL: 0-NO PAIN

## 2024-01-25 NOTE — PATIENT INSTRUCTIONS
TRESIBA YOU MAY WANT TO INCREASE IF YOUR WAKING BS ARE OVER 130 WHILE YOU ARE GETTING BACK ON TRACK WITH OZEMPIC     USE STARTER PEN 0.50 MG WEEKLY X 4 WEEKS THEN INCREASE TO 1 MG (36 CLICKS 2 MG PEN) FINISH THAT PEN AND IF NO SIDE EFFECTS RESUME TO 2 MG WEEKLY

## 2024-01-25 NOTE — PROGRESS NOTES
"BETHANY   Presents for follow up/metabolic management 67yo with DM Type 2 diagnosed in her 50s. History HTN, HLD, CAD, CHF. Current A1C 7.2% was 6.6%. Patient testing sugars 6 times day. No lows. Following carb controlled diet somewhat and know reasonable carb allowances. Patient able to afford their medications. Patient is exercising.         -CGM Freestyle Sulma 3 with phone maryann. Currently on insulin checking BS at least 4 xs a day adjustments made based on readings/frequent visits to manage.         -INSULIN Tresiba 25 units         -Metformin intolerant GLP1 Ozempic 2 mg weekly SGLT2 Farxiga TZD not an option CHF         -HTN Amlodipine Carvediolol Losartan daily at goal         -STATIN Pravastatin 20mg           Sulma report downloaded and attached, 7 day time in target 73%, no lows. Wakes up 120-150 most days, post prandial spikes, upper 200s after dinner, has been eating out a lot, travelling.      Current Outpatient Medications:     amLODIPine (Norvasc) 10 mg tablet, Take 1 tablet (10 mg) by mouth once daily., Disp: , Rfl:     blood-glucose sensor (FreeStyle Sulma 3 Sensor) device, USE AS DIRECTED CHANGE SENSOR EVERY 14 DAYS, Disp: 2 each, Rfl: 11    carvedilol (Coreg) 12.5 mg tablet, Take 1 tablet (12.5 mg) by mouth 2 times a day., Disp: 180 tablet, Rfl: 3    cholecalciferol (Vitamin D-3) 125 MCG (5000 UT) capsule, Take 1 capsule (125 mcg) by mouth once daily., Disp: , Rfl:     escitalopram (Lexapro) 10 mg tablet, Take 1 tablet (10 mg) by mouth once daily., Disp: 30 tablet, Rfl: 5    FreeStyle Sulma sensor system (FreeStyle Sulma 14 Day Sensor) kit, Inject under the skin., Disp: , Rfl:     insulin degludec (Tresiba FlexTouch U-100) 100 unit/mL (3 mL) injection, INJECT 64 UNITS SUBCUTANEOUSLY ONE TIME DAILY AT BEDTIME, Disp: 30 mL, Rfl: 5    losartan (Cozaar) 100 mg tablet, Take 1 tablet (100 mg) by mouth once daily., Disp: 90 tablet, Rfl: 3    pen needle, diabetic 32 gauge x 5/32\" needle, Inject 1 Pen needle " under the skin 4 times a day.  use 4 pen needles per day to inject insulin, Disp: , Rfl:     pravastatin (Pravachol) 20 mg tablet, Take 1 tablet (20 mg) by mouth once daily at bedtime., Disp: , Rfl:     semaglutide (Ozempic) 2 mg/dose (8 mg/3 mL) pen injector, INJECT 2 MG UNDER THE SKIN ONCE WEEKLY AS DIRECTED, Disp: 3 mL, Rfl: 5    dapagliflozin propanediol (Farxiga) 10 mg, TAKE 1 TABLET BY MOUTH ONE TIME DAILY, Disp: 30 tablet, Rfl: 5      Allergies as of 01/25/2024 - Reviewed 01/25/2024   Allergen Reaction Noted    Naproxen sodium Other and Unknown 08/25/2023    Lisinopril Cough 08/25/2023         Review of Systems   Cardiology: Lightheadedness-denies.  Chest pain-denies.  Leg edema-denies.  Palpitations-denies.  Respiratory: Cough-denies. Shortness of breath-denies.  Wheezing-denies.  Gastroenterology: Constipation-denies.  Diarrhea-denies.  Heartburn-denies.  Endocrinology: Cold intolerance-denies.  Heat intolerance-denies.  Sweats-denies.  Neurology: Headache-denies.  Tremor-denies.  Neuropathy in extremities-denies.  Psychology: Low energy-denies.  Irritability-denies.  Sleep disturbances-denies.      /85 (BP Location: Left arm, Patient Position: Sitting)   Wt 64.4 kg (142 lb)   BMI 25.97 kg/m²       Labs:  Lab Results   Component Value Date    WBC 7.6 12/31/2021    NRBC 0 12/31/2021    RBC 4.84 12/31/2021    HGB 13.5 12/31/2021    HCT 41.7 12/31/2021     12/31/2021     Lab Results   Component Value Date    CALCIUM 9.5 12/31/2021    AST 13 12/31/2021    ALKPHOS 98 12/31/2021    BILITOT 0.4 12/31/2021    PROT 7.1 12/31/2021    ALBUMIN 4.1 12/31/2021    GLOB 3.0 12/31/2021    AGR 1.4 (L) 12/31/2021     12/31/2021    K 4.2 12/31/2021     12/31/2021    CO2 27 12/31/2021    ANIONGAP 13 12/31/2021    BUN 17 12/31/2021    CREATININE 1.3 12/31/2021    UREACREAUR 13.1 12/31/2021    GLUCOSE 118 (H) 12/31/2021    ALT 5 12/31/2021    EGFR 44 12/31/2021     Lab Results   Component Value Date     CHOL 167 09/23/2022    TRIG 186 (H) 09/23/2022    HDL 37 (L) 09/23/2022    LDLCALC 93 09/23/2022     Lab Results   Component Value Date    MICROALBCREA 1,523.1 (H) 08/03/2021     Lab Results   Component Value Date    TSH 1.61 11/12/2019     Lab Results   Component Value Date    KYBYKZQG02 312 12/31/2021     Lab Results   Component Value Date    HGBA1C 7.2 (A) 01/25/2024         Physical Exam   General Appearance: pleasant, cooperative, no acute distress  HEENT: no chemosis, no proptosis, no lid lag, no lid retraction  Neck: no lymphadenopathy, no thyromegaly, no dominant thyroid nodules  Heart: no murmurs, regular rate and rhythm, S1 and S2  Lungs: no wheezes, no rhonci, no rales  Extremities: no lower extremity swelling      Assessment/Plan   1. Type 2 diabetes mellitus with diabetic polyneuropathy, with long-term current use of insulin (CMS/[facility])  -was out of Ozempic x 4 weeks then resumed same dose N/V next dose due this week, given sample pen to slowly resume full dose  -due for labs  -was out of CGM as well  -reviewed target BS  -reviewed allowable carbs per meal/snacks  -follow up 3 months    2. Hyperlipemia, mixed  -LDL target < 70  -tolerates statin  -due for fasting lipid panel    3. Benign hypertension  -at target       Follow Up: CNP 3 months      -labs/tests/notes reviewed  -reviewed and counseled patient on medication monitoring and side effects  Medical Decision Making  Complexity of problem: Chronic illness of diabetes mellitus uncontrolled, progressing  Data analyzed and reviewed: Reviewed prior notes, blood glucose data, labs including HgbA1c, lipids, serum chemistries.  Ordered tests.   Risk of complications and morbidities: Is definite because of use of insulin and risk of hypoglycemia.  Prescription medications reviewed and modifications made.  Compliance assessed.  Addressed social determinants of health including food insecurity.

## 2024-02-12 PROBLEM — Z95.1 PRESENCE OF AORTOCORONARY BYPASS GRAFT: Status: ACTIVE | Noted: 2023-05-27

## 2024-02-12 PROBLEM — S42.293A: Status: ACTIVE | Noted: 2023-10-31

## 2024-02-12 PROBLEM — S91.202A: Status: ACTIVE | Noted: 2023-05-27

## 2024-02-12 PROBLEM — E83.42 HYPOMAGNESEMIA: Status: ACTIVE | Noted: 2021-11-11

## 2024-02-12 PROBLEM — G62.9 POLYNEUROPATHY: Status: ACTIVE | Noted: 2023-05-27

## 2024-02-12 PROBLEM — S99.922A UNSPECIFIED INJURY OF LEFT FOOT, INITIAL ENCOUNTER: Status: ACTIVE | Noted: 2023-05-27

## 2024-02-12 PROBLEM — F43.22 ADJUSTMENT DISORDER WITH ANXIOUS MOOD: Status: ACTIVE | Noted: 2017-11-15

## 2024-02-12 PROBLEM — M25.529 ELBOW PAIN: Status: ACTIVE | Noted: 2019-11-26

## 2024-02-12 PROBLEM — Z86.79 HISTORY OF HYPERTENSION: Status: ACTIVE | Noted: 2024-02-12

## 2024-02-12 PROBLEM — E78.2 HYPERLIPIDEMIA, MIXED: Status: ACTIVE | Noted: 2017-11-15

## 2024-02-12 PROBLEM — R53.83 FATIGUE: Status: ACTIVE | Noted: 2024-02-12

## 2024-02-12 PROBLEM — S30.3XXA CONTUSION OF ANUS: Status: ACTIVE | Noted: 2024-02-12

## 2024-02-12 PROBLEM — E11.65 UNCONTROLLED TYPE 2 DIABETES MELLITUS WITH HYPERGLYCEMIA (MULTI): Status: ACTIVE | Noted: 2017-11-15

## 2024-02-15 ENCOUNTER — APPOINTMENT (OUTPATIENT)
Dept: ORTHOPEDIC SURGERY | Facility: CLINIC | Age: 69
End: 2024-02-15
Payer: COMMERCIAL

## 2024-02-19 PROCEDURE — RXMED WILLOW AMBULATORY MEDICATION CHARGE

## 2024-02-20 ENCOUNTER — PHARMACY VISIT (OUTPATIENT)
Dept: PHARMACY | Facility: CLINIC | Age: 69
End: 2024-02-20
Payer: COMMERCIAL

## 2024-02-29 ENCOUNTER — HOSPITAL ENCOUNTER (OUTPATIENT)
Dept: RADIOLOGY | Facility: CLINIC | Age: 69
Discharge: HOME | End: 2024-02-29
Payer: COMMERCIAL

## 2024-02-29 ENCOUNTER — OFFICE VISIT (OUTPATIENT)
Dept: ORTHOPEDIC SURGERY | Facility: CLINIC | Age: 69
End: 2024-02-29
Payer: COMMERCIAL

## 2024-02-29 VITALS — BODY MASS INDEX: 25.97 KG/M2 | WEIGHT: 141.98 LBS

## 2024-02-29 DIAGNOSIS — M79.602 LEFT ARM PAIN: Primary | ICD-10-CM

## 2024-02-29 DIAGNOSIS — S42.292D CLOSED 3-PART FRACTURE OF PROXIMAL HUMERUS WITH ROUTINE HEALING, LEFT: ICD-10-CM

## 2024-02-29 DIAGNOSIS — S42.90XA SHOULDER FRACTURE: ICD-10-CM

## 2024-02-29 PROCEDURE — 99213 OFFICE O/P EST LOW 20 MIN: CPT | Performed by: PHYSICIAN ASSISTANT

## 2024-02-29 PROCEDURE — 1036F TOBACCO NON-USER: CPT | Performed by: PHYSICIAN ASSISTANT

## 2024-02-29 PROCEDURE — 73030 X-RAY EXAM OF SHOULDER: CPT | Mod: LEFT SIDE | Performed by: ORTHOPAEDIC SURGERY

## 2024-02-29 PROCEDURE — 1160F RVW MEDS BY RX/DR IN RCRD: CPT | Performed by: PHYSICIAN ASSISTANT

## 2024-02-29 PROCEDURE — 4010F ACE/ARB THERAPY RXD/TAKEN: CPT | Performed by: PHYSICIAN ASSISTANT

## 2024-02-29 PROCEDURE — 1159F MED LIST DOCD IN RCRD: CPT | Performed by: PHYSICIAN ASSISTANT

## 2024-02-29 PROCEDURE — 73030 X-RAY EXAM OF SHOULDER: CPT | Mod: LT

## 2024-02-29 PROCEDURE — 1126F AMNT PAIN NOTED NONE PRSNT: CPT | Performed by: PHYSICIAN ASSISTANT

## 2024-02-29 ASSESSMENT — PAIN - FUNCTIONAL ASSESSMENT: PAIN_FUNCTIONAL_ASSESSMENT: 0-10

## 2024-02-29 ASSESSMENT — LIFESTYLE VARIABLES: TOTAL SCORE: 0

## 2024-02-29 ASSESSMENT — PAIN DESCRIPTION - DESCRIPTORS: DESCRIPTORS: ACHING

## 2024-02-29 ASSESSMENT — ENCOUNTER SYMPTOMS
LOSS OF SENSATION IN FEET: 0
OCCASIONAL FEELINGS OF UNSTEADINESS: 0
DEPRESSION: 0

## 2024-02-29 ASSESSMENT — PAIN SCALES - GENERAL: PAINLEVEL_OUTOF10: 5 - MODERATE PAIN

## 2024-02-29 NOTE — PATIENT INSTRUCTIONS
Thank you for coming to see us today!     Continue to use tylenol for pain control.   Rest, ice and elevate and remember to do gentle  exercises.     Follow up as needed.

## 2024-02-29 NOTE — PROGRESS NOTES
Subjective      Chief Complaint   Patient presents with    Left Shoulder - Follow-up     10/2023 fx        Past Surgical History:   Procedure Laterality Date    OTHER SURGICAL HISTORY  12/13/2021    Heart surgery        HPI  This 68 year old patient presents today with left shoulder pain (now currently 3/10). The patient states that the left shoulder pain has been present since a fall in [place]. She hit her head but did not loose consciousness. X-rays showed a left proximal humerus fracture. Dr. Hernandez previously treated her with activity modification and advancement in therapy. The patient states that the left shoulder pain is worse with and aggravated by reaching and lifting. It has improved after working with physical therapy. She is slowly resuming her normal activities of daily living.     CARDIOLOGY:   Negative for chest pain, shortness of breath.   RESPIRATORY:   Negative for chest pain, shortness of breath.   MUSCULOSKELETAL:   See HPI for details.   NEUROLOGY:   Negative for tingling, numbness, weakness.    Objective      There were no vitals taken for this visit.     SHOULDER EXAM  Constitutional: Appears stated age. No apparent distress  Labored Breathing: No  Psychiatric: Normal mood and effect.   Neurological: alert and oriented x3  Skin: intact  HEENT: No bruising, otorrhea, rhinorrhea. EOM intact.   MUSCULOSKELETAL: Neck: No tenderness. No pain or limitation with range of motion. Back: No tenderness. Straight leg test negative bilaterally. left shoulder: There is no tenderness anteriorly and laterally. Patient is able to complete active ROM from 0-120 degrees. Active ROM in flexion and abduction from 0-115 degrees.  Comparments are soft. Neurovascular is intact.     AP and lateral x-rays of the humerus and elbow show a comminuted fracture at the left proximal humerus.   CT Maxillofacial without contrast shows inferior left orbital wall fracture. CT of the head shows no evidence of acute fracture or  intracranial process. CT cervical spine shows no acute fracture.    Simran was seen today for follow-up.  Diagnoses and all orders for this visit:  Left arm pain (Primary)  Closed 3-part fracture of proximal humerus with routine healing, left    Options are discussed with the patient in detail.  The patient is instructed regarding activity modification and risk for further injury with falling or trauma, gentle passive range of motion advancing to active assisted and active ROM, ice, and the appropriate use of Tylenol as needed for pain with its potential adverse reactions and side effects. The patient understands. Return as needed.. Please note that this report has been produced using speech recognition software.  It may contain errors related to grammar, punctuation or spelling.  Electronically signed, but not reviewed.    Bhavani Forman PA-C

## 2024-03-29 ENCOUNTER — PHARMACY VISIT (OUTPATIENT)
Dept: PHARMACY | Facility: CLINIC | Age: 69
End: 2024-03-29
Payer: COMMERCIAL

## 2024-03-29 PROCEDURE — RXMED WILLOW AMBULATORY MEDICATION CHARGE

## 2024-04-16 ENCOUNTER — PHARMACY VISIT (OUTPATIENT)
Dept: PHARMACY | Facility: CLINIC | Age: 69
End: 2024-04-16
Payer: COMMERCIAL

## 2024-04-16 PROCEDURE — RXMED WILLOW AMBULATORY MEDICATION CHARGE

## 2024-04-30 ENCOUNTER — APPOINTMENT (OUTPATIENT)
Dept: ENDOCRINOLOGY | Facility: CLINIC | Age: 69
End: 2024-04-30
Payer: COMMERCIAL

## 2024-05-03 ENCOUNTER — PHARMACY VISIT (OUTPATIENT)
Dept: PHARMACY | Facility: CLINIC | Age: 69
End: 2024-05-03
Payer: COMMERCIAL

## 2024-05-03 PROCEDURE — RXMED WILLOW AMBULATORY MEDICATION CHARGE

## 2024-05-12 DIAGNOSIS — E78.00 ELEVATED CHOLESTEROL: ICD-10-CM

## 2024-05-12 DIAGNOSIS — I10 BENIGN HYPERTENSION: Primary | ICD-10-CM

## 2024-05-13 RX ORDER — AMLODIPINE BESYLATE 10 MG/1
10 TABLET ORAL DAILY
Qty: 90 TABLET | Refills: 3 | Status: SHIPPED | OUTPATIENT
Start: 2024-05-13 | End: 2025-05-13

## 2024-05-13 RX ORDER — PRAVASTATIN SODIUM 20 MG/1
20 TABLET ORAL NIGHTLY
Qty: 90 TABLET | Refills: 3 | Status: SHIPPED | OUTPATIENT
Start: 2024-05-13 | End: 2025-05-13

## 2024-05-15 ENCOUNTER — LAB (OUTPATIENT)
Dept: LAB | Facility: LAB | Age: 69
End: 2024-05-15
Payer: COMMERCIAL

## 2024-05-15 ENCOUNTER — OFFICE VISIT (OUTPATIENT)
Dept: PRIMARY CARE | Facility: CLINIC | Age: 69
End: 2024-05-15
Payer: COMMERCIAL

## 2024-05-15 VITALS
DIASTOLIC BLOOD PRESSURE: 70 MMHG | HEIGHT: 62 IN | HEART RATE: 80 BPM | BODY MASS INDEX: 26.87 KG/M2 | SYSTOLIC BLOOD PRESSURE: 140 MMHG | WEIGHT: 146 LBS | OXYGEN SATURATION: 95 %

## 2024-05-15 DIAGNOSIS — L30.9 DERMATITIS: ICD-10-CM

## 2024-05-15 DIAGNOSIS — E78.2 HYPERLIPIDEMIA, MIXED: ICD-10-CM

## 2024-05-15 DIAGNOSIS — F43.23 ADJUSTMENT REACTION WITH ANXIETY AND DEPRESSION: ICD-10-CM

## 2024-05-15 DIAGNOSIS — E55.9 VITAMIN D DEFICIENCY: ICD-10-CM

## 2024-05-15 DIAGNOSIS — Z00.00 ANNUAL PHYSICAL EXAM: Primary | ICD-10-CM

## 2024-05-15 DIAGNOSIS — E11.42 TYPE 2 DIABETES MELLITUS WITH DIABETIC POLYNEUROPATHY, WITH LONG-TERM CURRENT USE OF INSULIN (MULTI): ICD-10-CM

## 2024-05-15 DIAGNOSIS — R53.83 OTHER FATIGUE: ICD-10-CM

## 2024-05-15 DIAGNOSIS — Z79.4 TYPE 2 DIABETES MELLITUS WITH DIABETIC POLYNEUROPATHY, WITH LONG-TERM CURRENT USE OF INSULIN (MULTI): ICD-10-CM

## 2024-05-15 DIAGNOSIS — N95.0 POSTMENOPAUSAL BLEEDING: ICD-10-CM

## 2024-05-15 DIAGNOSIS — E53.8 VITAMIN B12 DEFICIENCY: ICD-10-CM

## 2024-05-15 LAB
25(OH)D3 SERPL-MCNC: 24 NG/ML (ref 31–100)
ALBUMIN SERPL-MCNC: 4.3 G/DL (ref 3.5–5)
ALP BLD-CCNC: 143 U/L (ref 35–125)
ALT SERPL-CCNC: 5 U/L (ref 5–40)
ANION GAP SERPL CALC-SCNC: 13 MMOL/L
AST SERPL-CCNC: 12 U/L (ref 5–40)
BILIRUB SERPL-MCNC: 0.3 MG/DL (ref 0.1–1.2)
BUN SERPL-MCNC: 26 MG/DL (ref 8–25)
CALCIUM SERPL-MCNC: 9.4 MG/DL (ref 8.5–10.4)
CHLORIDE SERPL-SCNC: 105 MMOL/L (ref 97–107)
CHOLEST SERPL-MCNC: 164 MG/DL (ref 133–200)
CHOLEST/HDLC SERPL: 3.2 {RATIO}
CO2 SERPL-SCNC: 26 MMOL/L (ref 24–31)
CREAT SERPL-MCNC: 1.4 MG/DL (ref 0.4–1.6)
CREAT UR-MCNC: 75.9 MG/DL
EGFRCR SERPLBLD CKD-EPI 2021: 41 ML/MIN/1.73M*2
ERYTHROCYTE [DISTWIDTH] IN BLOOD BY AUTOMATED COUNT: 12.4 % (ref 11.5–14.5)
GLUCOSE SERPL-MCNC: 177 MG/DL (ref 65–99)
HCT VFR BLD AUTO: 43.3 % (ref 36–46)
HDLC SERPL-MCNC: 52 MG/DL
HGB BLD-MCNC: 14 G/DL (ref 12–16)
LDLC SERPL CALC-MCNC: 95 MG/DL (ref 65–130)
MCH RBC QN AUTO: 27.9 PG (ref 26–34)
MCHC RBC AUTO-ENTMCNC: 32.3 G/DL (ref 32–36)
MCV RBC AUTO: 86 FL (ref 80–100)
MICROALBUMIN UR-MCNC: 1330 MG/L (ref 0–23)
MICROALBUMIN/CREAT UR: 1752.3 UG/MG CREAT
NRBC BLD-RTO: 0 /100 WBCS (ref 0–0)
PLATELET # BLD AUTO: 271 X10*3/UL (ref 150–450)
POTASSIUM SERPL-SCNC: 4.9 MMOL/L (ref 3.4–5.1)
PROT SERPL-MCNC: 7.2 G/DL (ref 5.9–7.9)
RBC # BLD AUTO: 5.02 X10*6/UL (ref 4–5.2)
SODIUM SERPL-SCNC: 144 MMOL/L (ref 133–145)
TRIGL SERPL-MCNC: 87 MG/DL (ref 40–150)
TSH SERPL DL<=0.05 MIU/L-ACNC: 1.2 MIU/L (ref 0.27–4.2)
VIT B12 SERPL-MCNC: 306 PG/ML (ref 211–946)
WBC # BLD AUTO: 7.2 X10*3/UL (ref 4.4–11.3)

## 2024-05-15 PROCEDURE — 90677 PCV20 VACCINE IM: CPT

## 2024-05-15 PROCEDURE — 3078F DIAST BP <80 MM HG: CPT

## 2024-05-15 PROCEDURE — 1126F AMNT PAIN NOTED NONE PRSNT: CPT

## 2024-05-15 PROCEDURE — 82607 VITAMIN B-12: CPT

## 2024-05-15 PROCEDURE — 1036F TOBACCO NON-USER: CPT

## 2024-05-15 PROCEDURE — 99397 PER PM REEVAL EST PAT 65+ YR: CPT

## 2024-05-15 PROCEDURE — 3077F SYST BP >= 140 MM HG: CPT

## 2024-05-15 PROCEDURE — 82043 UR ALBUMIN QUANTITATIVE: CPT

## 2024-05-15 PROCEDURE — 80061 LIPID PANEL: CPT

## 2024-05-15 PROCEDURE — 1123F ACP DISCUSS/DSCN MKR DOCD: CPT

## 2024-05-15 PROCEDURE — 82306 VITAMIN D 25 HYDROXY: CPT

## 2024-05-15 PROCEDURE — 80053 COMPREHEN METABOLIC PANEL: CPT

## 2024-05-15 PROCEDURE — 36415 COLL VENOUS BLD VENIPUNCTURE: CPT

## 2024-05-15 PROCEDURE — 1159F MED LIST DOCD IN RCRD: CPT

## 2024-05-15 PROCEDURE — 99213 OFFICE O/P EST LOW 20 MIN: CPT

## 2024-05-15 PROCEDURE — 1160F RVW MEDS BY RX/DR IN RCRD: CPT

## 2024-05-15 PROCEDURE — 82570 ASSAY OF URINE CREATININE: CPT

## 2024-05-15 PROCEDURE — 85027 COMPLETE CBC AUTOMATED: CPT

## 2024-05-15 PROCEDURE — 4010F ACE/ARB THERAPY RXD/TAKEN: CPT

## 2024-05-15 PROCEDURE — 84443 ASSAY THYROID STIM HORMONE: CPT

## 2024-05-15 PROCEDURE — 90471 IMMUNIZATION ADMIN: CPT

## 2024-05-15 RX ORDER — ESCITALOPRAM OXALATE 10 MG/1
10 TABLET ORAL DAILY
Qty: 90 TABLET | Refills: 1 | Status: SHIPPED | OUTPATIENT
Start: 2024-05-15

## 2024-05-15 RX ORDER — TRIAMCINOLONE ACETONIDE 1 MG/G
CREAM TOPICAL 2 TIMES DAILY
Qty: 30 G | Refills: 0 | Status: SHIPPED | OUTPATIENT
Start: 2024-05-15

## 2024-05-15 ASSESSMENT — ENCOUNTER SYMPTOMS
HEMATURIA: 0
LIGHT-HEADEDNESS: 0
CONSTIPATION: 1
SORE THROAT: 0
BLOOD IN STOOL: 0
FATIGUE: 1
DIAPHORESIS: 0
DYSURIA: 0
DIFFICULTY URINATING: 0
COUGH: 0
MYALGIAS: 0
WHEEZING: 0
ADENOPATHY: 0
SHORTNESS OF BREATH: 0
PALPITATIONS: 0
VOMITING: 0
NAUSEA: 0
ARTHRALGIAS: 1
FEVER: 0
DIARRHEA: 0

## 2024-05-15 ASSESSMENT — PAIN SCALES - GENERAL: PAINLEVEL: 0-NO PAIN

## 2024-05-15 NOTE — PROGRESS NOTES
Subjective   Patient ID: Simran Burroughs is a 69 y.o. female who presents for Annual Exam (physical and discuss itchy legs and fatigue /Spotting /la).    Hx IDDM, CAD, ASCVD, HLD, HTN,     Other providers: Previous PCP Dr. Hansen,   Dr. Gross (cardiology), Margi Laughlin CNP (endocrinologist), Dr. Angel Spring (ophthalmologist)    All medications besides Lexapro prescribed through specialist.     Depression with anxiety: controlled 10 mg Lexapro. Has been on for years. Denies SI.     Acute concerns:  Menstrual spotting x one year. At first was here and there. This is the first time she has mentioned to provider. Menopause around age 56. Does not recall last PAP. Sometimes has increase with discharge with spotting. Patient is currently wearing liners everyday. +fatigue.    Itchy lower legs x months.     HM: PAP unknown. Mammogram UTD 11/2023. Colon screening 11/2020 through Dr. Davis. Lung screening never smoker. DEXA will order with mammogram at next appt. Vaccinations Tdap 2016, Prevnar 20 due, will get today, will consider shingrex in the future.           Review of Systems   Constitutional:  Positive for fatigue. Negative for diaphoresis and fever.   HENT:  Negative for congestion, hearing loss and sore throat.    Eyes:  Negative for visual disturbance.   Respiratory:  Negative for cough, shortness of breath and wheezing.    Cardiovascular:  Negative for chest pain, palpitations and leg swelling.   Gastrointestinal:  Positive for constipation. Negative for blood in stool, diarrhea, nausea and vomiting.   Genitourinary:  Positive for vaginal bleeding. Negative for difficulty urinating, dysuria and hematuria.   Musculoskeletal:  Positive for arthralgias. Negative for myalgias.   Skin:  Positive for rash.   Allergic/Immunologic: Negative for immunocompromised state.   Neurological:  Negative for syncope and light-headedness.   Hematological:  Negative for adenopathy.   Psychiatric/Behavioral:  Negative  "for suicidal ideas.        Objective   /70   Pulse 80   Ht 1.575 m (5' 2\")   Wt 66.2 kg (146 lb)   SpO2 95%   BMI 26.70 kg/m²     Physical Exam  Constitutional:       General: She is not in acute distress.     Appearance: Normal appearance.   HENT:      Right Ear: Tympanic membrane and ear canal normal.      Left Ear: Tympanic membrane and ear canal normal.      Nose: Nose normal.      Mouth/Throat:      Mouth: Mucous membranes are moist.      Pharynx: Oropharynx is clear. No oropharyngeal exudate or posterior oropharyngeal erythema.   Eyes:      Extraocular Movements: Extraocular movements intact.      Conjunctiva/sclera: Conjunctivae normal.      Pupils: Pupils are equal, round, and reactive to light.   Neck:      Thyroid: No thyroid mass or thyromegaly.   Cardiovascular:      Rate and Rhythm: Normal rate and regular rhythm.      Pulses: Normal pulses.      Heart sounds: Murmur heard.      No gallop.   Pulmonary:      Effort: Pulmonary effort is normal.      Breath sounds: No wheezing, rhonchi or rales.   Abdominal:      General: Bowel sounds are normal.      Palpations: Abdomen is soft. There is no hepatomegaly, splenomegaly or mass.      Tenderness: There is no abdominal tenderness. There is no guarding.   Musculoskeletal:         General: Normal range of motion.      Right lower leg: No edema.      Left lower leg: No edema.   Lymphadenopathy:      Cervical: No cervical adenopathy.   Skin:     General: Skin is warm.      Findings: Rash present. Rash is papular.      Comments: Papule rash with excoriations of lower legs   Neurological:      General: No focal deficit present.      Mental Status: She is alert.      Motor: Motor function is intact. No weakness.   Psychiatric:         Mood and Affect: Mood normal.         Thought Content: Thought content normal.         Assessment/Plan   Diagnoses and all orders for this visit:  Annual physical exam  Adjustment reaction with anxiety and depression  -     " escitalopram (Lexapro) 10 mg tablet; Take 1 tablet (10 mg) by mouth once daily.  Hyperlipidemia, mixed  -     Lipid panel; Future  Vitamin D deficiency  -     Vitamin D 25-Hydroxy,Total (for eval of Vitamin D levels); Future  Postmenopausal bleeding  -     US pelvis transvaginal; Future  Dermatitis  -     triamcinolone (Kenalog) 0.1 % cream; Apply topically 2 times a day. Apply to affected area 1-2 times daily as needed. Avoid face and groin.  Other orders  -     Pneumococcal conjugate vaccine, 20-valent (PREVNAR 20)  Obtain labwork from me and her endocrinologist, schedule US, will call with results. Routine follow-up 6 months for anxiety.

## 2024-05-16 ENCOUNTER — TELEPHONE (OUTPATIENT)
Dept: PRIMARY CARE | Facility: CLINIC | Age: 69
End: 2024-05-16
Payer: COMMERCIAL

## 2024-05-16 NOTE — TELEPHONE ENCOUNTER
Labs overall stable. Vitamin D still a little low, make sure is taking supplement daily. Kidney function showed slight decline, endocrinologist should address more but just need to continue controlling blood pressure and diabetes to prevent further decline. Single liver enzyme slightly elevated, will re-check with next labs.

## 2024-05-17 ENCOUNTER — DOCUMENTATION (OUTPATIENT)
Dept: PHYSICAL THERAPY | Facility: CLINIC | Age: 69
End: 2024-05-17
Payer: COMMERCIAL

## 2024-05-17 NOTE — PROGRESS NOTES
Physical Therapy    Discharge Summary    Name: Simran Burroughs  MRN: [id number]  : 1955  Date: 24    Discharge Summary: PT    Discharge Information: Date of discharge 2024, Date of last visit 2024, Date of evaluation 2023, Number of attended visits , Referred by Dr. Hernandez, and Referred for proximal humerus fracture    Therapy Summary: Pt attended 5 out of 16 scheduled visits.  Did not return for further treatment.  At last visit, pain 2/10                     AAROM left shoulder flex 151deg    Discharge Status: goals partially met     Rehab Discharge Reason: Failed to schedule and/or keep follow-up appointment(s)

## 2024-06-03 ENCOUNTER — PHARMACY VISIT (OUTPATIENT)
Dept: PHARMACY | Facility: CLINIC | Age: 69
End: 2024-06-03
Payer: COMMERCIAL

## 2024-06-03 PROCEDURE — RXMED WILLOW AMBULATORY MEDICATION CHARGE

## 2024-06-04 ENCOUNTER — HOSPITAL ENCOUNTER (OUTPATIENT)
Dept: RADIOLOGY | Facility: HOSPITAL | Age: 69
Discharge: HOME | End: 2024-06-04
Payer: COMMERCIAL

## 2024-06-04 DIAGNOSIS — N95.0 POSTMENOPAUSAL BLEEDING: ICD-10-CM

## 2024-06-04 PROCEDURE — 76856 US EXAM PELVIC COMPLETE: CPT

## 2024-06-04 PROCEDURE — 76856 US EXAM PELVIC COMPLETE: CPT | Performed by: RADIOLOGY

## 2024-06-04 PROCEDURE — 76830 TRANSVAGINAL US NON-OB: CPT | Performed by: RADIOLOGY

## 2024-06-04 PROCEDURE — 93976 VASCULAR STUDY: CPT | Performed by: RADIOLOGY

## 2024-06-05 ENCOUNTER — TELEPHONE (OUTPATIENT)
Dept: PRIMARY CARE | Facility: CLINIC | Age: 69
End: 2024-06-05
Payer: COMMERCIAL

## 2024-06-05 DIAGNOSIS — N85.8 MASS OF UTERUS: ICD-10-CM

## 2024-06-05 DIAGNOSIS — N95.0 POSTMENOPAUSAL BLEEDING: Primary | ICD-10-CM

## 2024-06-05 NOTE — TELEPHONE ENCOUNTER
US of uterus showed 6.1 cm mass on uterus, further imaging ordered with MRI. Since MRI uses contrast needs to go to lab to having kidney function checked prior to imaging. Also make sure patient declines she has any of the following.... Does the patient have a Cochlear Implant, Pacemaker, Defibrillator, Pacing Wire, Brain Aneurysm Clip, Implanted Nerve or Bone Graft Simulator, Implanted Breast Tissue Expander, Glucose Monitor or Neulasta Device? If present will need to cancel MRI. Call centralized scheduling for MRI.    Also patient referred to [facility] to schedule biopsy of mass. They should be calling patient within the week, if she does receive a call let me know.    If she has any additional questions or would like to speak to me, let me know and I can call her during lunch or end of day.

## 2024-06-07 ENCOUNTER — LAB (OUTPATIENT)
Dept: LAB | Facility: LAB | Age: 69
End: 2024-06-07
Payer: COMMERCIAL

## 2024-06-07 ENCOUNTER — TELEPHONE (OUTPATIENT)
Dept: HEMATOLOGY/ONCOLOGY | Facility: HOSPITAL | Age: 69
End: 2024-06-07

## 2024-06-07 ENCOUNTER — TELEPHONE (OUTPATIENT)
Dept: PRIMARY CARE | Facility: CLINIC | Age: 69
End: 2024-06-07

## 2024-06-07 DIAGNOSIS — N85.8 MASS OF UTERUS: ICD-10-CM

## 2024-06-07 LAB
ANION GAP SERPL CALC-SCNC: 10 MMOL/L
BUN SERPL-MCNC: 24 MG/DL (ref 8–25)
CALCIUM SERPL-MCNC: 9.5 MG/DL (ref 8.5–10.4)
CHLORIDE SERPL-SCNC: 106 MMOL/L (ref 97–107)
CO2 SERPL-SCNC: 27 MMOL/L (ref 24–31)
CREAT SERPL-MCNC: 1.4 MG/DL (ref 0.4–1.6)
EGFRCR SERPLBLD CKD-EPI 2021: 41 ML/MIN/1.73M*2
GLUCOSE SERPL-MCNC: 130 MG/DL (ref 65–99)
POTASSIUM SERPL-SCNC: 4.4 MMOL/L (ref 3.4–5.1)
SODIUM SERPL-SCNC: 143 MMOL/L (ref 133–145)

## 2024-06-07 PROCEDURE — 36415 COLL VENOUS BLD VENIPUNCTURE: CPT

## 2024-06-07 PROCEDURE — 80048 BASIC METABOLIC PNL TOTAL CA: CPT

## 2024-06-07 NOTE — TELEPHONE ENCOUNTER
Called patient and left a voicemail to call back regarding her [facility] Referral. Left our contact information. Will reach out to her again on Monday 6/10 if we don't hear back.

## 2024-06-07 NOTE — TELEPHONE ENCOUNTER
Pt called back and has a few questions to ask Sarina. Pt can be reached at [phone]. Pt denies any of the having a Cochlear Implant, Pacemaker, Defibrillator, Pacing Wire, Brain Aneurysm Clip, Implanted Nerve or Bone Graft Simulator, Implanted Breast Tissue Expander, Glucose Monitor or Neulasta Device.

## 2024-06-10 ENCOUNTER — TELEPHONE (OUTPATIENT)
Dept: HEMATOLOGY/ONCOLOGY | Facility: HOSPITAL | Age: 69
End: 2024-06-10
Payer: COMMERCIAL

## 2024-06-10 DIAGNOSIS — N85.8 UTERINE MASS: Primary | ICD-10-CM

## 2024-06-10 NOTE — TELEPHONE ENCOUNTER
Patient referred to our [facility] by Sarina Don PA-C for a uterine mass. MRI of the pelvis scheduled for 6/25/24. Placed stat referral for gynecology. Have been unable to reach patient x 3, but left voicemail detailing the plan.

## 2024-06-11 ENCOUNTER — TELEPHONE (OUTPATIENT)
Dept: PRIMARY CARE | Facility: CLINIC | Age: 69
End: 2024-06-11
Payer: COMMERCIAL

## 2024-06-11 PROCEDURE — RXMED WILLOW AMBULATORY MEDICATION CHARGE

## 2024-06-11 NOTE — TELEPHONE ENCOUNTER
Pt called stating she scheduled her MRI for her pelvis - she states she was told that she could not schedule her biopsy until after her MRI -- She wants to verify with the provider that this is OK --- Please Advise

## 2024-06-12 ENCOUNTER — PHARMACY VISIT (OUTPATIENT)
Dept: PHARMACY | Facility: CLINIC | Age: 69
End: 2024-06-12
Payer: COMMERCIAL

## 2024-06-25 ENCOUNTER — HOSPITAL ENCOUNTER (OUTPATIENT)
Dept: RADIOLOGY | Facility: HOSPITAL | Age: 69
Discharge: HOME | End: 2024-06-25
Payer: COMMERCIAL

## 2024-06-25 DIAGNOSIS — N95.0 POSTMENOPAUSAL BLEEDING: ICD-10-CM

## 2024-06-25 DIAGNOSIS — N85.8 MASS OF UTERUS: ICD-10-CM

## 2024-06-25 PROCEDURE — A9575 INJ GADOTERATE MEGLUMI 0.1ML: HCPCS

## 2024-06-25 PROCEDURE — 72197 MRI PELVIS W/O & W/DYE: CPT | Performed by: STUDENT IN AN ORGANIZED HEALTH CARE EDUCATION/TRAINING PROGRAM

## 2024-06-25 PROCEDURE — 2550000001 HC RX 255 CONTRASTS

## 2024-06-25 PROCEDURE — 72197 MRI PELVIS W/O & W/DYE: CPT

## 2024-06-25 RX ORDER — GADOTERATE MEGLUMINE 376.9 MG/ML
13 INJECTION INTRAVENOUS
Status: COMPLETED | OUTPATIENT
Start: 2024-06-25 | End: 2024-06-25

## 2024-06-25 RX ORDER — GADOTERATE MEGLUMINE 376.9 MG/ML
13 INJECTION INTRAVENOUS
Status: CANCELLED | OUTPATIENT
Start: 2024-06-25

## 2024-06-27 ENCOUNTER — TELEPHONE (OUTPATIENT)
Dept: PRIMARY CARE | Facility: CLINIC | Age: 69
End: 2024-06-27
Payer: COMMERCIAL

## 2024-06-27 NOTE — TELEPHONE ENCOUNTER
MRI showed a 3.8 x 2.9 cm uterine mass. Good news in the imaging it appears it has not spread to intesdtines and they are no enlarged lyph nodes. I will forward the imaging to the GYN oncologist Dr. Ananya Pablo who you have an appointment with on 7/11/2024.

## 2024-07-02 PROCEDURE — RXMED WILLOW AMBULATORY MEDICATION CHARGE

## 2024-07-03 ENCOUNTER — PHARMACY VISIT (OUTPATIENT)
Dept: PHARMACY | Facility: CLINIC | Age: 69
End: 2024-07-03
Payer: COMMERCIAL

## 2024-07-11 ENCOUNTER — OFFICE VISIT (OUTPATIENT)
Dept: GYNECOLOGIC ONCOLOGY | Facility: CLINIC | Age: 69
End: 2024-07-11
Payer: COMMERCIAL

## 2024-07-11 VITALS
DIASTOLIC BLOOD PRESSURE: 80 MMHG | RESPIRATION RATE: 18 BRPM | HEART RATE: 74 BPM | BODY MASS INDEX: 27.02 KG/M2 | OXYGEN SATURATION: 96 % | TEMPERATURE: 98 F | HEIGHT: 62 IN | SYSTOLIC BLOOD PRESSURE: 182 MMHG | WEIGHT: 146.83 LBS

## 2024-07-11 DIAGNOSIS — N85.8 UTERINE MASS: Primary | ICD-10-CM

## 2024-07-11 DIAGNOSIS — N95.0 POSTMENOPAUSAL BLEEDING: ICD-10-CM

## 2024-07-11 DIAGNOSIS — I10 ELEVATED BLOOD PRESSURE READING IN OFFICE WITH DIAGNOSIS OF HYPERTENSION: ICD-10-CM

## 2024-07-11 DIAGNOSIS — Z95.1 S/P CORONARY ARTERY BYPASS GRAFT X 3: ICD-10-CM

## 2024-07-11 PROCEDURE — 3077F SYST BP >= 140 MM HG: CPT | Performed by: STUDENT IN AN ORGANIZED HEALTH CARE EDUCATION/TRAINING PROGRAM

## 2024-07-11 PROCEDURE — 99215 OFFICE O/P EST HI 40 MIN: CPT | Performed by: STUDENT IN AN ORGANIZED HEALTH CARE EDUCATION/TRAINING PROGRAM

## 2024-07-11 PROCEDURE — 3048F LDL-C <100 MG/DL: CPT | Performed by: STUDENT IN AN ORGANIZED HEALTH CARE EDUCATION/TRAINING PROGRAM

## 2024-07-11 PROCEDURE — 1126F AMNT PAIN NOTED NONE PRSNT: CPT | Performed by: STUDENT IN AN ORGANIZED HEALTH CARE EDUCATION/TRAINING PROGRAM

## 2024-07-11 PROCEDURE — 99205 OFFICE O/P NEW HI 60 MIN: CPT | Performed by: STUDENT IN AN ORGANIZED HEALTH CARE EDUCATION/TRAINING PROGRAM

## 2024-07-11 PROCEDURE — 1160F RVW MEDS BY RX/DR IN RCRD: CPT | Performed by: STUDENT IN AN ORGANIZED HEALTH CARE EDUCATION/TRAINING PROGRAM

## 2024-07-11 PROCEDURE — 3079F DIAST BP 80-89 MM HG: CPT | Performed by: STUDENT IN AN ORGANIZED HEALTH CARE EDUCATION/TRAINING PROGRAM

## 2024-07-11 PROCEDURE — 1123F ACP DISCUSS/DSCN MKR DOCD: CPT | Performed by: STUDENT IN AN ORGANIZED HEALTH CARE EDUCATION/TRAINING PROGRAM

## 2024-07-11 PROCEDURE — 58100 BIOPSY OF UTERUS LINING: CPT | Performed by: STUDENT IN AN ORGANIZED HEALTH CARE EDUCATION/TRAINING PROGRAM

## 2024-07-11 PROCEDURE — 1036F TOBACCO NON-USER: CPT | Performed by: STUDENT IN AN ORGANIZED HEALTH CARE EDUCATION/TRAINING PROGRAM

## 2024-07-11 PROCEDURE — 1159F MED LIST DOCD IN RCRD: CPT | Performed by: STUDENT IN AN ORGANIZED HEALTH CARE EDUCATION/TRAINING PROGRAM

## 2024-07-11 PROCEDURE — 3062F POS MACROALBUMINURIA REV: CPT | Performed by: STUDENT IN AN ORGANIZED HEALTH CARE EDUCATION/TRAINING PROGRAM

## 2024-07-11 PROCEDURE — 4010F ACE/ARB THERAPY RXD/TAKEN: CPT | Performed by: STUDENT IN AN ORGANIZED HEALTH CARE EDUCATION/TRAINING PROGRAM

## 2024-07-11 RX ORDER — ACETAMINOPHEN 325 MG/1
975 TABLET ORAL ONCE
OUTPATIENT
Start: 2024-07-11 | End: 2024-07-11

## 2024-07-11 RX ORDER — CELECOXIB 400 MG/1
400 CAPSULE ORAL ONCE
OUTPATIENT
Start: 2024-07-11 | End: 2024-07-11

## 2024-07-11 RX ORDER — HEPARIN SODIUM 5000 [USP'U]/ML
5000 INJECTION, SOLUTION INTRAVENOUS; SUBCUTANEOUS ONCE
OUTPATIENT
Start: 2024-07-11 | End: 2024-07-11

## 2024-07-11 RX ORDER — PREDNISOLONE ACETATE 10 MG/ML
SUSPENSION/ DROPS OPHTHALMIC
COMMUNITY
Start: 2024-06-25

## 2024-07-11 RX ORDER — GABAPENTIN 600 MG/1
600 TABLET ORAL ONCE
OUTPATIENT
Start: 2024-07-11 | End: 2024-07-11

## 2024-07-11 RX ORDER — LANOLIN ALCOHOL/MO/W.PET/CERES
1000 CREAM (GRAM) TOPICAL DAILY
COMMUNITY

## 2024-07-11 ASSESSMENT — ENCOUNTER SYMPTOMS
LOSS OF SENSATION IN FEET: 0
OCCASIONAL FEELINGS OF UNSTEADINESS: 0
DEPRESSION: 0

## 2024-07-11 ASSESSMENT — PAIN SCALES - GENERAL: PAINLEVEL: 0-NO PAIN

## 2024-07-11 ASSESSMENT — COLUMBIA-SUICIDE SEVERITY RATING SCALE - C-SSRS
1. IN THE PAST MONTH, HAVE YOU WISHED YOU WERE DEAD OR WISHED YOU COULD GO TO SLEEP AND NOT WAKE UP?: NO
6. HAVE YOU EVER DONE ANYTHING, STARTED TO DO ANYTHING, OR PREPARED TO DO ANYTHING TO END YOUR LIFE?: NO
2. HAVE YOU ACTUALLY HAD ANY THOUGHTS OF KILLING YOURSELF?: NO

## 2024-07-11 NOTE — PROGRESS NOTES
"  Gynecologic Oncology Initial Consultation    Patient ID: Simran Burroughs is a 69 y.o. female.  Referring Physician: No referring provider defined for this encounter.  Primary Care Provider: Sarina Don PA-C      Reason for Consultation: uterine mass    Subjective    68yo  with incidentally found uterine mass during PCP workup of vaginal spotting x1 year. Last GYN evaluation >10 years - previously seen by Tammy Hansen for primary care and transitioning to new provider. Reports vaginal spotting with increased frequency over past year - sporadic intermittent bleeding with occasional bright red clots. Denies abdminopelvic pain or recent weight changes. Reports baseline mixed urinary incontinence with cough/laugh/sneeze with urgency for past year. Denies flank pain. No changes in color/consistency of stools but baseline constipation. No night sweats, persistent cough or any other concerns.    A comprehensive review of systems was performed and otherwise negative.    Objective   BSA: 1.7 meters squared  BP (!) 182/80 (BP Location: Right arm, Patient Position: Sitting, BP Cuff Size: Adult long) Comment (BP Location): manual  Pulse 74   Temp 36.7 °C (98 °F) (Temporal)   Resp 18   Ht (S) 1.57 m (5' 1.81\")   Wt 66.6 kg (146 lb 13.2 oz)   SpO2 96%   BMI 27.02 kg/m²     Past Medical History:   Diagnosis Date    Fracture, humerus     Left shoulder pain     Other specified diabetes mellitus without complications (Multi)     Diabetes mellitus of other type without complication    Personal history of other diseases of the circulatory system     History of hypertension   - HTN, HLD  - CAD s/p triple bypass (age 59) - Dr Gross in [place]  - Renal insufficiency   - Insulin-dependent diabetes   - Depression     Past Surgical History:   Procedure Laterality Date    OTHER SURGICAL HISTORY  2021    Heart surgery   - bilateral cateract surgery      Family History   Problem Relation Name Age of Onset    " Diabetes Mother      Lymphoma Brother      Cancer Brother     - Last colonoscopy 2020  - Last mammogram 2021 - Cat 2     OB/GYN Hx  - FTSVD x2  - Menopausal age 56; +PMB x1 year   - Last Pap: uncertain     Simran Burroughs  reports that she has never smoked. She has never used smokeless tobacco.  She  reports that she does not currently use alcohol after a past usage of about 2.0 standard drinks of alcohol per week. 1-2x per month   She  reports no history of drug use.  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 and bno inguinal lymphadenopathy   Extremities: warm, well-perfused without cyanosis, clubbing or edema   Skin: Normal     Procedure - Endometrial Biopsy  Verbal consent obtained from patient after informed discussion regarding risks/benefits of procedure.   Speculum placed in vagina and cervix cleansed with betadine solution. Anterior lip of cervix grasped with a tenaculum. Pipelle passed through the cervix without difficulty and uterus sounded to depth of 8cm.   Moderate old blood noted on pipelle x2 passes. Hemostasis noted with procedure.     Recent Imaging;  24 - MRI Pelvis  IMPRESSION:  Postcontrast images are limited given motion artifact and body habitus.  1. Infiltrative polypoidal endometrial soft tissue mass measuring 3.8 x 2.9 cm with a evidence of surrounding foci of myometrial wall invasion (mainly  along the right anterior aspect) along with upstream endometrial canal distention. There is apparent asymmetric right lower extra uterine soft tissue fullness beyond the uterine confine highly concerning for extension. The mass is abutting the adjacent small bowel loops but no definite invasion as well as no definite urinary bladder involvement. Findings are highly concerning for endometrial carcinoma and advise tissue sampling.   2. No grossly enlarged pelvic lymph nodes    24 - US Pelvis  IMPRESSION:  1. Large 6.1 cm complex cystic and frond-like solid mass involving the majority of the uterus without discrete visualization of the endometrium. Differential includes a neoplasm of endometrial origin, versus less likely myometrial origin with obscuration of the endometrium. Recommend correlation with endometrial biopsy and MRI pelvis with and without contrast.    2. Normal right ovary. Left ovary not visualized.    Performance Status:  Symptomatic; fully ambulatory    Assessment/Plan    70yo  with uterine mass, postmenopausal bleeding concerning for uterine malignancy. Multiple medical comorbidities: HTN, CAD s/p CABGx3, IDDM with renal insufficiency. ECOG 0.     Diagnoses and all orders for this visit:  Uterine mass  Postmenopausal bleeding  - Imaging and prior assessments reviewed; endometrial biopsy performed with bleeding precautions reviewed. Physical examination and imaging highly suspicious for uterine malignancy and patient/family advised we will follow up results.   - The natural history of endometrial carcinoma was discussed in setting of her pathology.  We discussed the significance of tumor grade and stage, and that a majority of women are at early stage at time of diagnosis as determined by surgical pathology. Standard of care treatment options and alternatives were reviewed, including the recommendation for surgical management with hysterectomy, bilateral salpingo-oophorectomy, and sentinel  lymph node evaluation via minimally invasive approach. We discussed the benefits of minimally invasive surgery over open surgical techniques including shorter postoperative recovery, shorter duration of hospitalization and return to baseline activity levels. Patient was counseled regarding risk of conversion to laparotomy and possible full lymphadenectomy in setting of unsuccessful lymph node mapping.  - I have specifically discussed in detail with the patient the risks of surgery, including bleeding, infection, scarring, thromboembolic complications, lymphedema, incisional complications and injury to adjacent organs and structures, including the bladder, ureters, intestinal tract, major blood vessels, and nerves, need for additional procedures and possible death. Postoperative recovery was reviewed, including the risk of venous thrombosis. The patient was given time to ask pertinent questions and would like to proceed with the procedure(s) listed above. Other alternatives including nonsurgical options reviewed with patients. All questions were answered to patient's satisfaction.  - Reviewed postoperative expectations and instructions, including pelvic rest for 6 weeks, no heavy lifting for 4 weeks. Surgical consents reviewed and signed in office.   - PAT testing prior to surgery per protocol +Cardiology evaluation pending 7/29; likely will need stress testing  - Overnight observation recommended due to medical comorbidities     -     Surgical Pathology Exam  -     Case Request Operating Room: Hysterectomy Laparoscopy with Salpingo-Oophorectomy, [place] lymph node mapping and biopsies, surgical staging, all indicated procedures; Standing  -     Request for Pre-Admission Testing Visit; Future  -     Type And Screen; Future  -     CBC; Future  Elevated blood pressure reading in office with diagnosis of hypertension  S/P coronary artery bypass graft x 3  - Follow up with Cardiology pending; asymptomatic with severely  elevated blood pressures at time of initial consultation  - Continue ongoing medical optimization; precautions reviewed     Ananya Pablo MD

## 2024-07-16 LAB
LABORATORY COMMENT REPORT: NORMAL
PATH REPORT.COMMENTS IMP SPEC: NORMAL
PATH REPORT.FINAL DX SPEC: NORMAL
PATH REPORT.GROSS SPEC: NORMAL
PATH REPORT.RELEVANT HX SPEC: NORMAL
PATH REPORT.TOTAL CANCER: NORMAL

## 2024-07-22 ENCOUNTER — TELEPHONE (OUTPATIENT)
Dept: GYNECOLOGIC ONCOLOGY | Facility: HOSPITAL | Age: 69
End: 2024-07-22
Payer: COMMERCIAL

## 2024-07-22 NOTE — TELEPHONE ENCOUNTER
EMB results forwarded to Dr. Agustin and Dr. Pablo for their review.  Dr. Agustin recommends  proceeding with surgery as recommended.    Phoned patient to notify that biopsy results confirmed uterine cancer and Dr. Agustin recommends surgery as discussed with Dr. Pablo.     Notified patient that surgery has been rescheduled from 8/13/24 to 8/14/24 with Dr. Yane Mustafa.    Patient verbalized her understanding of information given.

## 2024-07-23 ENCOUNTER — APPOINTMENT (OUTPATIENT)
Dept: ENDOCRINOLOGY | Facility: CLINIC | Age: 69
End: 2024-07-23
Payer: COMMERCIAL

## 2024-07-23 VITALS
WEIGHT: 146 LBS | SYSTOLIC BLOOD PRESSURE: 147 MMHG | DIASTOLIC BLOOD PRESSURE: 77 MMHG | BODY MASS INDEX: 26.87 KG/M2 | HEIGHT: 62 IN | HEART RATE: 83 BPM

## 2024-07-23 DIAGNOSIS — E11.42 TYPE 2 DIABETES MELLITUS WITH DIABETIC POLYNEUROPATHY, WITH LONG-TERM CURRENT USE OF INSULIN (MULTI): ICD-10-CM

## 2024-07-23 DIAGNOSIS — Z79.4 TYPE 2 DIABETES MELLITUS WITH DIABETIC POLYNEUROPATHY, WITH LONG-TERM CURRENT USE OF INSULIN (MULTI): ICD-10-CM

## 2024-07-23 DIAGNOSIS — I10 BENIGN HYPERTENSION: ICD-10-CM

## 2024-07-23 DIAGNOSIS — E78.2 HYPERLIPEMIA, MIXED: Primary | ICD-10-CM

## 2024-07-23 LAB — POC HEMOGLOBIN A1C: 8.2 % (ref 4.2–6.5)

## 2024-07-23 PROCEDURE — 3078F DIAST BP <80 MM HG: CPT | Performed by: NURSE PRACTITIONER

## 2024-07-23 PROCEDURE — 4010F ACE/ARB THERAPY RXD/TAKEN: CPT | Performed by: NURSE PRACTITIONER

## 2024-07-23 PROCEDURE — 1123F ACP DISCUSS/DSCN MKR DOCD: CPT | Performed by: NURSE PRACTITIONER

## 2024-07-23 PROCEDURE — 1159F MED LIST DOCD IN RCRD: CPT | Performed by: NURSE PRACTITIONER

## 2024-07-23 PROCEDURE — 3077F SYST BP >= 140 MM HG: CPT | Performed by: NURSE PRACTITIONER

## 2024-07-23 PROCEDURE — 99213 OFFICE O/P EST LOW 20 MIN: CPT | Performed by: NURSE PRACTITIONER

## 2024-07-23 PROCEDURE — 3048F LDL-C <100 MG/DL: CPT | Performed by: NURSE PRACTITIONER

## 2024-07-23 PROCEDURE — 3008F BODY MASS INDEX DOCD: CPT | Performed by: NURSE PRACTITIONER

## 2024-07-23 PROCEDURE — 3062F POS MACROALBUMINURIA REV: CPT | Performed by: NURSE PRACTITIONER

## 2024-07-23 PROCEDURE — 83036 HEMOGLOBIN GLYCOSYLATED A1C: CPT | Performed by: NURSE PRACTITIONER

## 2024-07-23 PROCEDURE — 1126F AMNT PAIN NOTED NONE PRSNT: CPT | Performed by: NURSE PRACTITIONER

## 2024-07-23 RX ORDER — KETOROLAC TROMETHAMINE 4 MG/ML
SOLUTION/ DROPS OPHTHALMIC
COMMUNITY
Start: 2024-07-15

## 2024-07-23 ASSESSMENT — ENCOUNTER SYMPTOMS
LOSS OF SENSATION IN FEET: 0
DEPRESSION: 0
OCCASIONAL FEELINGS OF UNSTEADINESS: 0

## 2024-07-23 ASSESSMENT — PAIN SCALES - GENERAL: PAINLEVEL: 0-NO PAIN

## 2024-07-23 ASSESSMENT — LIFESTYLE VARIABLES
HOW MANY STANDARD DRINKS CONTAINING ALCOHOL DO YOU HAVE ON A TYPICAL DAY: 1 OR 2
HOW OFTEN DO YOU HAVE SIX OR MORE DRINKS ON ONE OCCASION: NEVER
AUDIT-C TOTAL SCORE: 2
HOW OFTEN DO YOU HAVE A DRINK CONTAINING ALCOHOL: 2-4 TIMES A MONTH
SKIP TO QUESTIONS 9-10: 1

## 2024-07-23 NOTE — PATIENT INSTRUCTIONS
IF YOU ARE STILL TAKING 32 UNITS OF TRESIBA FOR YOUR SURGERY YOU WOULD ONLY TAKE 25 UNITS OR 80% OF YOUR NORMAL DOSE     IF WAKING BS IS OVER 140 INCREASE THE TRESIBA BY 2 UNITS EVERY 3 DAYS UNTIL THE WAKING BS -130

## 2024-07-23 NOTE — PROGRESS NOTES
HPI   Presents for follow up/metabolic management 70 yo with DM Type 2 diagnosed in her 50s. History HTN, HLD, CAD, CHF, new diagnosis uterine cancer. Current A1C 8.2% was 7.2%. Patient testing sugars 6 times day. No lows. Following carb controlled diet somewhat and know reasonable carb allowances. Patient able to afford their medications. Patient is exercising.         -CGM Freestyle Sulma 3 with phone maryann, has been out (failed sensors). Currently on insulin checking BS at least 4 xs a day adjustments made based on readings/frequent visits to manage.         -INSULIN Tresiba 32 units daily         -Metformin intolerant GLP1 Ozempic 2 mg weekly SGLT2 Farxiga TZD no CHF         -HTN Amlodipine Carvediolol Losartan daily at goal         -STATIN Pravastatin 20mg             Current Outpatient Medications:     amLODIPine (Norvasc) 10 mg tablet, Take 1 tablet (10 mg) by mouth once daily., Disp: 90 tablet, Rfl: 3    blood-glucose sensor (FreeStyle Sulma 3 Sensor) device, USE AS DIRECTED CHANGE SENSOR EVERY 14 DAYS, Disp: 2 each, Rfl: 11    carvedilol (Coreg) 12.5 mg tablet, Take 1 tablet (12.5 mg) by mouth 2 times a day., Disp: 180 tablet, Rfl: 3    cholecalciferol (Vitamin D-3) 125 MCG (5000 UT) capsule, Take 1 capsule (125 mcg) by mouth once daily., Disp: , Rfl:     cyanocobalamin (Vitamin B-12) 1,000 mcg tablet, Take 1 tablet (1,000 mcg) by mouth once daily., Disp: , Rfl:     dapagliflozin propanediol (Farxiga) 10 mg, TAKE 1 TABLET BY MOUTH ONE TIME DAILY, Disp: 30 tablet, Rfl: 5    escitalopram (Lexapro) 10 mg tablet, Take 1 tablet (10 mg) by mouth once daily., Disp: 90 tablet, Rfl: 1    FreeStyle Sulma sensor system (FreeStyle Sulma 14 Day Sensor) kit, Inject under the skin., Disp: , Rfl:     insulin degludec (Tresiba FlexTouch U-100) 100 unit/mL (3 mL) injection, INJECT 64 UNITS SUBCUTANEOUSLY ONE TIME DAILY AT BEDTIME, Disp: 30 mL, Rfl: 5    ketorolac (Acular) 0.4 % ophthalmic solution, instill 1 drop in both eyes  "twice daily, Disp: , Rfl:     losartan (Cozaar) 100 mg tablet, Take 1 tablet (100 mg) by mouth once daily., Disp: 90 tablet, Rfl: 3    pen needle, diabetic 32 gauge x 5/32\" needle, Inject 1 Pen needle under the skin 4 times a day.  use 4 pen needles per day to inject insulin, Disp: , Rfl:     pravastatin (Pravachol) 20 mg tablet, Take 1 tablet (20 mg) by mouth once daily at bedtime., Disp: 90 tablet, Rfl: 3    semaglutide (Ozempic) 2 mg/dose (8 mg/3 mL) pen injector, INJECT 2 MG UNDER THE SKIN ONCE WEEKLY AS DIRECTED, Disp: 3 mL, Rfl: 5    prednisoLONE acetate (Pred-Forte) 1 % ophthalmic suspension, SHAKE LIQUID AND INSTILL 1 DROP IN BOTH EYES FOUR TIMES DAILY, Disp: , Rfl:     triamcinolone (Kenalog) 0.1 % cream, Apply topically 2 times a day. Apply to affected area 1-2 times daily as needed. Avoid face and groin. (Patient not taking: Reported on 7/11/2024), Disp: 30 g, Rfl: 0      Allergies as of 07/23/2024 - Reviewed 07/23/2024   Allergen Reaction Noted    Naproxen sodium Other and Unknown 08/25/2023    Lisinopril Cough 08/25/2023         Review of Systems   Cardiology: Lightheadedness-denies.  Chest pain-denies.  Leg edema-denies.  Palpitations-denies.  Respiratory: Cough-denies. Shortness of breath-denies.  Wheezing-denies.  Gastroenterology: Constipation-denies.  Diarrhea-denies.  Heartburn-denies.  Endocrinology: Cold intolerance-denies.  Heat intolerance-denies.  Sweats-denies.  Neurology: Headache-denies.  Tremor-denies.  Neuropathy in extremities-denies.  Psychology: Low energy-denies.  Irritability-denies.  Sleep disturbances-denies.      /77   Pulse 83   Ht 1.575 m (5' 2\")   Wt 66.2 kg (146 lb)   BMI 26.70 kg/m²       Labs:  Lab Results   Component Value Date    WBC 7.2 05/15/2024    NRBC 0.0 05/15/2024    RBC 5.02 05/15/2024    HGB 14.0 05/15/2024    HCT 43.3 05/15/2024     05/15/2024     Lab Results   Component Value Date    CALCIUM 9.5 06/07/2024    AST 12 05/15/2024    ALKPHOS 143 " (H) 05/15/2024    BILITOT 0.3 05/15/2024    PROT 7.2 05/15/2024    ALBUMIN 4.3 05/15/2024    GLOB 3.0 12/31/2021    AGR 1.4 (L) 12/31/2021     06/07/2024    K 4.4 06/07/2024     06/07/2024    CO2 27 06/07/2024    ANIONGAP 10 06/07/2024    BUN 24 06/07/2024    CREATININE 1.40 06/07/2024    UREACREAUR 13.1 12/31/2021    GLUCOSE 130 (H) 06/07/2024    ALT 5 05/15/2024    EGFR 41 (L) 06/07/2024     Lab Results   Component Value Date    CHOL 164 05/15/2024    TRIG 87 05/15/2024    HDL 52.0 05/15/2024    LDLCALC 95 05/15/2024     Lab Results   Component Value Date    MICROALBCREA 1,752.3 05/15/2024     Lab Results   Component Value Date    TSH 1.20 05/15/2024     Lab Results   Component Value Date    ZOQZTGFJ02 306 05/15/2024     Lab Results   Component Value Date    HGBA1C 8.2 (A) 07/23/2024         Physical Exam   General Appearance: pleasant, cooperative, no acute distress  HEENT: no chemosis, no proptosis, no lid lag, no lid retraction  Neck: no lymphadenopathy, no thyromegaly, no dominant thyroid nodules  Heart: no murmurs, regular rate and rhythm, S1 and S2  Lungs: no wheezes, no rhonci, no rales  Extremities: no lower extremity swelling      Assessment/Plan   1. Type 2 diabetes mellitus with diabetic polyneuropathy, with long-term current use of insulin (CMS/[facility])  -increased A1c stress eating  -sensor issues, no data to download, sample Sulma 3 given  -reviewed target BS  -reviewed allowable carbs per meal/snacks  -instructed on dose adjustment Tresiba based on waking BS    2. Hyperlipemia, mixed  -LDL target < 70  -tolerates statin    3. Benign hypertension  -stable       Follow Up: 4 months Alexandra or Jen    -labs/tests/notes reviewed  -reviewed and counseled patient on medication monitoring and side effects

## 2024-07-28 NOTE — PROGRESS NOTES
Subjective      Chief Complaint   Patient presents with    S/C Hysterectomy Laparoscopy with Salpingo-Oophorectomy 08/          She now has uterin cancer and is to have surgery August 14th. She has a history of coronary artery disease and in 2014 she had open heart surgery with LIMA to LAD NAHUM off the LIMA to the obtuse marginal branch saphenous vein graft to diagonal saphenous vein graft to the posterior descending artery and posterolateral branch.  An echocardiogram was done in 2017 showing EF of 40 to 44%.  She does have a history of hypertension hypercholesterolemia.  She is not complaining of chest discomfort.  No complaints of PND or orthopnea.  The legs are not swelling on her.  NO palpitaions.  The BP is mildly up today and the A1c has been up to 8.  The EKG today shows NSR NSTTWchanges  LVH and is unchanged from previous              Review of Systems   Constitutional: Negative. Negative for chills and fever.   HENT: Negative.     Eyes: Negative.    Respiratory: Negative.  Negative for cough and shortness of breath.    Endocrine: Negative.    Skin: Negative.    Musculoskeletal: Negative.  Negative for falls.   Gastrointestinal: Negative.    Genitourinary: Negative.    Neurological: Negative.    All other systems reviewed and are negative.       Past Surgical History:   Procedure Laterality Date    OTHER SURGICAL HISTORY  12/13/2021    Heart surgery        Active Ambulatory Problems     Diagnosis Date Noted    Impaired cardiac function 08/25/2023    Cardiac arrhythmia 08/25/2023    Knee pain 08/25/2023    Adjustment reaction with anxiety 09/21/2020    Adjustment disorder with anxious mood 11/15/2017    Anxiety 11/11/2021    Atherosclerosis of coronary artery without angina pectoris 04/15/2014    Essential hypertension, benign 11/20/2012    Blood glucose abnormal 08/25/2023    Charcot's arthropathy associated with type 2 diabetes mellitus (Multi) 08/25/2023    Stage 2 chronic kidney disease 08/25/2023     Recurrent depressive disorder (CMS-[facility]) 11/26/2019    Situational depression 05/26/2022    Diabetes mellitus type 2 in nonobese (Multi) 08/25/2023    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) 08/25/2023    Type 2 diabetes mellitus with mild nonproliferative diabetic retinopathy without macular edema, bilateral (Multi) 08/25/2023    Heart failure (Multi) 08/25/2023    Hyperglycemia due to type 2 diabetes mellitus (Multi) 08/25/2023    Hyperlipidemia, mixed 11/15/2017    Hyperlipemia, mixed 08/25/2023    Injury of knee 08/25/2023    Unspecified injury of left foot, initial encounter 05/27/2023    Intermittent vertigo 09/21/2020    Hypomagnesemia 11/11/2021    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/25/2023    Neck pain 11/26/2019    Postmenopausal bleeding 05/26/2022    Rib pain 08/25/2023    Right shoulder pain 11/26/2019    S/P coronary artery bypass graft x 3 08/25/2023    Snoring 11/11/2021    Tinnitus of both ears 08/25/2023    Cobalamin deficiency 11/11/2021    Vitamin D deficiency 11/11/2021    Left elbow pain 10/31/2023    Elbow pain 11/26/2019    Closed 3-part fracture of proximal humerus with routine healing, left 10/31/2023    Contusion of anus 02/12/2024    Polyneuropathy 05/27/2023    Displacement of cervical intervertebral disc without myelopathy 11/05/2004    Fatigue 02/12/2024    History of hypertension 02/12/2024    Brachial neuritis or radiculitis 11/05/2004    Attention deficit hyperactivity disorder (ADHD) 11/20/2012    Presence of aortocoronary bypass graft 05/27/2023    Uncontrolled type 2 diabetes mellitus with hyperglycemia (Multi) 11/15/2017    Unspecified open wound of left great toe with damage to nail, initial encounter 05/27/2023    Left arm pain 02/29/2024    Uterine mass 07/11/2024    Preop cardiovascular exam 07/29/2024     Resolved Ambulatory Problems     Diagnosis Date Noted    Lab test positive for detection of COVID-19 virus 11/23/2020  "    Past Medical History:   Diagnosis Date    Fracture, humerus     Left shoulder pain     Other specified diabetes mellitus without complications (Multi)     Personal history of other diseases of the circulatory system         Visit Vitals  /75   Pulse 68   Wt 68 kg (150 lb)   SpO2 96%   BMI 27.44 kg/m²   Smoking Status Never   BSA 1.72 m²        Objective     Cardiovascular:      PMI at left midclavicular line. Normal rate. Regular rhythm. Normal S1. Normal S2.       Murmurs: There is a grade 2/6 harsh midsystolic murmur at the URSB, radiating to the neck.      No gallop.  No click. No rub.   Pulses:     Intact distal pulses.            Lab Review:         Lab Results   Component Value Date    CHOL 164 05/15/2024    CHOL 167 09/23/2022    CHOL 147 08/03/2021     Lab Results   Component Value Date    HDL 52.0 05/15/2024    HDL 37 (L) 09/23/2022    HDL 36 (L) 08/03/2021     Lab Results   Component Value Date    LDLCALC 95 05/15/2024    LDLCALC 93 09/23/2022    LDLCALC 77 08/03/2021     Lab Results   Component Value Date    TRIG 87 05/15/2024    TRIG 186 (H) 09/23/2022    TRIG 170 (H) 08/03/2021     No components found for: \"CHOLHDL\"     Assessment/Plan     Preop cardiovascular exam  She is now diagnosed with ovarian cancer and is to have surgery next month.  Rested clear for surgery cardiac wise.  She is not complain of any symptoms of angina factors nor congestive heart failure.  She does remain active.  Her EKG is unchanged from previous EKGs.  Last echocardiogram did show that her ejection fraction is diminished in the 40 to 45% range.  She did have her open surgery approximately 10 years ago and is doing well from this.  Feel cardiac wise she is doing well and will clear her for surgery.     "

## 2024-07-29 ENCOUNTER — OFFICE VISIT (OUTPATIENT)
Dept: CARDIOLOGY | Facility: CLINIC | Age: 69
End: 2024-07-29
Payer: COMMERCIAL

## 2024-07-29 ENCOUNTER — PHARMACY VISIT (OUTPATIENT)
Dept: PHARMACY | Facility: CLINIC | Age: 69
End: 2024-07-29
Payer: COMMERCIAL

## 2024-07-29 VITALS
WEIGHT: 150 LBS | OXYGEN SATURATION: 96 % | HEART RATE: 68 BPM | BODY MASS INDEX: 27.44 KG/M2 | DIASTOLIC BLOOD PRESSURE: 75 MMHG | SYSTOLIC BLOOD PRESSURE: 152 MMHG

## 2024-07-29 DIAGNOSIS — I25.10 ATHEROSCLEROSIS OF NATIVE CORONARY ARTERY OF NATIVE HEART WITHOUT ANGINA PECTORIS: Primary | ICD-10-CM

## 2024-07-29 DIAGNOSIS — Z01.810 PREOP CARDIOVASCULAR EXAM: ICD-10-CM

## 2024-07-29 PROCEDURE — 93010 ELECTROCARDIOGRAM REPORT: CPT | Performed by: INTERNAL MEDICINE

## 2024-07-29 PROCEDURE — 4010F ACE/ARB THERAPY RXD/TAKEN: CPT | Performed by: INTERNAL MEDICINE

## 2024-07-29 PROCEDURE — 99213 OFFICE O/P EST LOW 20 MIN: CPT | Performed by: INTERNAL MEDICINE

## 2024-07-29 PROCEDURE — 93005 ELECTROCARDIOGRAM TRACING: CPT | Performed by: INTERNAL MEDICINE

## 2024-07-29 PROCEDURE — 1126F AMNT PAIN NOTED NONE PRSNT: CPT | Performed by: INTERNAL MEDICINE

## 2024-07-29 PROCEDURE — 1159F MED LIST DOCD IN RCRD: CPT | Performed by: INTERNAL MEDICINE

## 2024-07-29 PROCEDURE — RXMED WILLOW AMBULATORY MEDICATION CHARGE

## 2024-07-29 PROCEDURE — 3048F LDL-C <100 MG/DL: CPT | Performed by: INTERNAL MEDICINE

## 2024-07-29 PROCEDURE — 3078F DIAST BP <80 MM HG: CPT | Performed by: INTERNAL MEDICINE

## 2024-07-29 PROCEDURE — 3077F SYST BP >= 140 MM HG: CPT | Performed by: INTERNAL MEDICINE

## 2024-07-29 PROCEDURE — 1036F TOBACCO NON-USER: CPT | Performed by: INTERNAL MEDICINE

## 2024-07-29 PROCEDURE — 3062F POS MACROALBUMINURIA REV: CPT | Performed by: INTERNAL MEDICINE

## 2024-07-29 PROCEDURE — 1123F ACP DISCUSS/DSCN MKR DOCD: CPT | Performed by: INTERNAL MEDICINE

## 2024-07-29 ASSESSMENT — ENCOUNTER SYMPTOMS
RESPIRATORY NEGATIVE: 1
MUSCULOSKELETAL NEGATIVE: 1
OCCASIONAL FEELINGS OF UNSTEADINESS: 1
DEPRESSION: 0
CONSTITUTIONAL NEGATIVE: 1
GASTROINTESTINAL NEGATIVE: 1
LOSS OF SENSATION IN FEET: 1
SHORTNESS OF BREATH: 0
FALLS: 0
FEVER: 0
COUGH: 0
EYES NEGATIVE: 1
CHILLS: 0
NEUROLOGICAL NEGATIVE: 1
ENDOCRINE NEGATIVE: 1

## 2024-07-29 ASSESSMENT — PAIN SCALES - GENERAL: PAINLEVEL: 0-NO PAIN

## 2024-07-29 NOTE — ASSESSMENT & PLAN NOTE
She is now diagnosed with ovarian cancer and is to have surgery next month.  Rested clear for surgery cardiac wise.  She is not complain of any symptoms of angina factors nor congestive heart failure.  She does remain active.  Her EKG is unchanged from previous EKGs.  Last echocardiogram did show that her ejection fraction is diminished in the 40 to 45% range.  She did have her open surgery approximately 10 years ago and is doing well from this.  Feel cardiac wise she is doing well and will clear her for surgery.

## 2024-08-01 NOTE — HOSPITAL COURSE
Simran Burroughs is a 69 y.o. female with uterine mass suspicious for malignancy and postmenopausal bleeding presenting for Hysterectomy Laparoscopy with Salpingo-Oophorectomy, [place] lymph node mapping and biopsies, surgical staging, all indicated procedures;     Preop labs (8/5/24): Hgb 14.4, Plt 276, Cr 1.37 (patient's baseline)    PAT (8/5/24): cleared, see note for risk scores    Cardiology evaluation 7/29/24: cleared for surgery     Tumor History:  6/4/24: pelvic ultrasound showing endometrial mass concerning for neoplasm  6/25/24: MRI pelvis showing olypoidal endometrial soft tissue mass measuring 3.8  x 2.9 cm with a evidence of surrounding foci of myometrial wall invasion (mainly along the right anterior aspect) along with upstream endometrial canal distention  7/11/24: EMB showing low grade enodmetroid type endometrial carcinoma     Pelvic US 6/4/24  IMPRESSION:  1. Large 6.1 cm complex cystic and frond-like solid mass involving  the majority of the uterus without discrete visualization of the  endometrium. Differential includes a neoplasm of endometrial origin,  versus less likely myometrial origin with obscuration of the  endometrium. Recommend correlation with endometrial biopsy and MRI  pelvis with and without contrast.      2. Normal right ovary. Left ovary not visualized.    MR Pelvis 6/25/24  IMPRESSION:  Postcontrast images are limited given motion artifact and body  habitus.  1. Infiltrative polypoidal endometrial soft tissue mass measuring 3.8  x 2.9 cm with a evidence of surrounding foci of myometrial wall  invasion (mainly along the right anterior aspect) along with upstream  endometrial canal distention. There is apparent asymmetric right  lower extra uterine soft tissue fullness beyond the uterine confine  highly concerning for extension. The mass is abutting the adjacent  small bowel loops but no definite invasion as well as no definite  urinary bladder involvement. Findings are highly  concerning for  endometrial carcinoma and advise tissue sampling  2. No grossly enlarged pelvic lymph nodes    EMB 2024  A. ENDOMETRIUM BIOPSY:   -- SCANT SMALL FRAGMENTS WITH FEATURES CONSISTENT WITH LOW-GRADE ENDOMETRIOID TYPE ENDOMETRIAL CARCINOMA EMBEDDED IN MUCUS, SEE COMMENT    PMH:   - HTN, HLD  - CAD s/p triple bypass (age 59) - Dr Gross in [place]  - Renal insufficiency   - Insulin-dependent diabetes   - Depression     PSH:  -bilateral cateract surgery     Ob/Gyn History:     FTSVD x2  - Menopausal age 56; +PMB x1 year   - Last Pap: uncertain      Social Hx:  she has never smoked. She has never used smokeless tobacco.  She  reports that she does not currently use alcohol after a past usage of about 2.0 standard drinks of alcohol per week. 1-2x per month   She  reports no history of drug use.  Works in [email or web address] ([employer]); lives with spouse and dogs     Fam Hx:  - Last colonoscopy 2020  - Last mammogram 2021 - Cat 2        Medications:  Lexapro 10mg   Pravastatin 20mg  Amlodipine 10mg    Allergies:  Naproxen sodium and Lisinopril

## 2024-08-05 ENCOUNTER — PRE-ADMISSION TESTING (OUTPATIENT)
Dept: PREADMISSION TESTING | Facility: HOSPITAL | Age: 69
End: 2024-08-05
Payer: COMMERCIAL

## 2024-08-05 VITALS
OXYGEN SATURATION: 97 % | HEIGHT: 62 IN | SYSTOLIC BLOOD PRESSURE: 150 MMHG | BODY MASS INDEX: 26.89 KG/M2 | TEMPERATURE: 98.1 F | WEIGHT: 146.14 LBS | DIASTOLIC BLOOD PRESSURE: 90 MMHG | HEART RATE: 79 BPM

## 2024-08-05 DIAGNOSIS — Z01.818 PREOPERATIVE EXAMINATION: Primary | ICD-10-CM

## 2024-08-05 DIAGNOSIS — R01.1 MURMUR, CARDIAC: ICD-10-CM

## 2024-08-05 DIAGNOSIS — N85.8 UTERINE MASS: ICD-10-CM

## 2024-08-05 DIAGNOSIS — N95.0 POSTMENOPAUSAL BLEEDING: ICD-10-CM

## 2024-08-05 LAB
ABO GROUP (TYPE) IN BLOOD: NORMAL
ANION GAP SERPL CALC-SCNC: 14 MMOL/L (ref 10–20)
ANTIBODY SCREEN: NORMAL
BUN SERPL-MCNC: 24 MG/DL (ref 6–23)
CALCIUM SERPL-MCNC: 9.4 MG/DL (ref 8.6–10.6)
CHLORIDE SERPL-SCNC: 103 MMOL/L (ref 98–107)
CO2 SERPL-SCNC: 27 MMOL/L (ref 21–32)
CREAT SERPL-MCNC: 1.37 MG/DL (ref 0.5–1.05)
EGFRCR SERPLBLD CKD-EPI 2021: 42 ML/MIN/1.73M*2
ERYTHROCYTE [DISTWIDTH] IN BLOOD BY AUTOMATED COUNT: 12.2 % (ref 11.5–14.5)
GLUCOSE SERPL-MCNC: 187 MG/DL (ref 74–99)
HCT VFR BLD AUTO: 44.2 % (ref 36–46)
HGB BLD-MCNC: 14.4 G/DL (ref 12–16)
MCH RBC QN AUTO: 27.9 PG (ref 26–34)
MCHC RBC AUTO-ENTMCNC: 32.6 G/DL (ref 32–36)
MCV RBC AUTO: 86 FL (ref 80–100)
NRBC BLD-RTO: 0 /100 WBCS (ref 0–0)
PLATELET # BLD AUTO: 276 X10*3/UL (ref 150–450)
POTASSIUM SERPL-SCNC: 4.4 MMOL/L (ref 3.5–5.3)
RBC # BLD AUTO: 5.16 X10*6/UL (ref 4–5.2)
RH FACTOR (ANTIGEN D): NORMAL
SODIUM SERPL-SCNC: 140 MMOL/L (ref 136–145)
WBC # BLD AUTO: 8.2 X10*3/UL (ref 4.4–11.3)

## 2024-08-05 PROCEDURE — 36415 COLL VENOUS BLD VENIPUNCTURE: CPT

## 2024-08-05 PROCEDURE — 86901 BLOOD TYPING SEROLOGIC RH(D): CPT

## 2024-08-05 PROCEDURE — 82374 ASSAY BLOOD CARBON DIOXIDE: CPT

## 2024-08-05 PROCEDURE — 85027 COMPLETE CBC AUTOMATED: CPT

## 2024-08-05 PROCEDURE — 99204 OFFICE O/P NEW MOD 45 MIN: CPT | Performed by: NURSE PRACTITIONER

## 2024-08-05 ASSESSMENT — DUKE ACTIVITY SCORE INDEX (DASI)
DASI METS SCORE: 7.3
CAN YOU HAVE SEXUAL RELATIONS: YES
CAN YOU CLIMB A FLIGHT OF STAIRS OR WALK UP A HILL: YES
CAN YOU TAKE CARE OF YOURSELF (EAT, DRESS, BATHE, OR USE TOILET): YES
CAN YOU PARTICIPATE IN MODERATE RECREATIONAL ACTIVITIES LIKE GOLF, BOWLING, DANCING, DOUBLES TENNIS OR THROWING A BASEBALL OR FOOTBALL: NO
CAN YOU DO MODERATE WORK AROUND THE HOUSE LIKE VACUUMING, SWEEPING FLOORS OR CARRYING GROCERIES: YES
CAN YOU WALK INDOORS, SUCH AS AROUND YOUR HOUSE: YES
CAN YOU DO HEAVY WORK AROUND THE HOUSE LIKE SCRUBBING FLOORS OR LIFTING AND MOVING HEAVY FURNITURE: NO
CAN YOU RUN A SHORT DISTANCE: YES
CAN YOU DO YARD WORK LIKE RAKING LEAVES, WEEDING OR PUSHING A MOWER: YES
CAN YOU WALK A BLOCK OR TWO ON LEVEL GROUND: YES
TOTAL_SCORE: 36.7
CAN YOU PARTICIPATE IN STRENOUS SPORTS LIKE SWIMMING, SINGLES TENNIS, FOOTBALL, BASKETBALL, OR SKIING: NO
CAN YOU DO LIGHT WORK AROUND THE HOUSE LIKE DUSTING OR WASHING DISHES: YES

## 2024-08-05 ASSESSMENT — ENCOUNTER SYMPTOMS
CARDIOVASCULAR NEGATIVE: 1
NECK NEGATIVE: 1
EYES NEGATIVE: 1
ENDOCRINE NEGATIVE: 1
CONSTITUTIONAL NEGATIVE: 1
RESPIRATORY NEGATIVE: 1
MUSCULOSKELETAL NEGATIVE: 1
GASTROINTESTINAL NEGATIVE: 1

## 2024-08-05 ASSESSMENT — LIFESTYLE VARIABLES: SMOKING_STATUS: NONSMOKER

## 2024-08-05 NOTE — CPM/PAT H&P
CPM/PAT Evaluation       Name: Simran Burroughs (Simran Burroughs)  /Age: 1955/69 y.o.     Visit Type:   In-Person       Chief Complaint: Uterine mass and postmenopausal bleeding scheduled for surgery    HPI: Patient is a 69-year-old female scheduled for laparoscopic hysterectomy with salpingo-oophorectomy sentinel lymph node mapping and biopsies on 2024 for treatment of uterine mass with postmenopausal bleeding.  The patient is referred by Dr. Juana Mustafa for preoperative evaluation of osteoarthritis, CAD with MI status post CABG, hypertension, hyperlipidemia, IDDM, uterine mass scheduled for surgery.    Past Medical History:   Diagnosis Date    Arthritis     Coronary artery disease     Fracture, humerus     Hyperlipidemia     Hypertension     Left shoulder pain     Myocardial infarction (Multi)     Other specified diabetes mellitus without complications (Multi)     Diabetes mellitus of other type without complication    Personal history of other diseases of the circulatory system     History of hypertension    Type 2 diabetes mellitus (Multi)        Past Surgical History:   Procedure Laterality Date    CORONARY ARTERY BYPASS GRAFT      OTHER SURGICAL HISTORY  2021    Heart surgery       Patient Sexual activity questions deferred to the physician.    Family History   Problem Relation Name Age of Onset    Diabetes Mother      Lymphoma Brother      Cancer Brother         Allergies   Allergen Reactions    Naproxen Sodium Other and Unknown     HYPOTENSION    Lisinopril Cough       Prior to Admission medications    Medication Sig Start Date End Date Taking? Authorizing Provider   amLODIPine (Norvasc) 10 mg tablet Take 1 tablet (10 mg) by mouth once daily. 24 Yes Albino Gross MD   blood-glucose sensor (FreeStyle Sulma 3 Sensor) device USE AS DIRECTED CHANGE SENSOR EVERY 14 DAYS 1/11/24 1/10/25 Yes Merritt Samson MD   carvedilol (Coreg) 12.5 mg tablet Take 1 tablet (12.5  "mg) by mouth 2 times a day. 11/2/23 11/1/24 Yes Albino Gross MD   cholecalciferol (Vitamin D-3) 125 MCG (5000 UT) capsule Take 1 capsule (125 mcg) by mouth once daily. 5/2/23  Yes Historical Provider, MD   cyanocobalamin (Vitamin B-12) 1,000 mcg tablet Take 1 tablet (1,000 mcg) by mouth once daily.   Yes Historical Provider, MD   dapagliflozin propanediol (Farxiga) 10 mg TAKE 1 TABLET BY MOUTH ONE TIME DAILY 11/16/23 11/15/24 Yes Merritt Samson MD   escitalopram (Lexapro) 10 mg tablet Take 1 tablet (10 mg) by mouth once daily. 5/15/24  Yes Sarina Don PA-C   insulin degludec (Tresiba FlexTouch U-100) 100 unit/mL (3 mL) injection INJECT 64 UNITS SUBCUTANEOUSLY ONE TIME DAILY AT BEDTIME  Patient taking differently: 32 units daily 10/27/23  Yes Merritt Samson MD   losartan (Cozaar) 100 mg tablet Take 1 tablet (100 mg) by mouth once daily. 12/27/23 12/26/24 Yes Albino Gross MD   pen needle, diabetic 32 gauge x 5/32\" needle Inject 1 Pen needle under the skin 4 times a day.  use 4 pen needles per day to inject insulin   Yes Historical Provider, MD   pravastatin (Pravachol) 20 mg tablet Take 1 tablet (20 mg) by mouth once daily at bedtime. 5/13/24 5/13/25 Yes Albino Gross MD   semaglutide (Ozempic) 2 mg/dose (8 mg/3 mL) pen injector INJECT 2 MG UNDER THE SKIN ONCE WEEKLY AS DIRECTED 11/16/23 11/15/24 Yes Cuba Dhillon, APRN-CNP        PAT ROS:   Constitutional:   neg    Neuro/Psych:    Tingling in bilateral feet from known diabetic peripheral neuropathy  Eyes:   neg    Ears:    tinnitus  Nose:   neg    Mouth:   neg    Throat:   neg    Neck:   neg    Cardio:   neg    Respiratory:   neg    Endocrine:   neg    GI:   neg    :   neg    Musculoskeletal:   neg    Hematologic:   neg    Skin:  neg        Physical Exam  Vitals reviewed.   Constitutional:       Appearance: Normal appearance.   HENT:      Head: Normocephalic.      Mouth/Throat:      Mouth: Mucous membranes are moist.   Eyes:      " Conjunctiva/sclera: Conjunctivae normal.   Neck:      Vascular: No carotid bruit.   Cardiovascular:      Rate and Rhythm: Normal rate and regular rhythm.      Pulses: Normal pulses.      Heart sounds: Murmur heard.      Systolic murmur is present with a grade of 3/6.      Comments: Grade 3/6 systolic murmur heard best at right sternal border with slight radiation to left sternal border.  Pulmonary:      Effort: Pulmonary effort is normal.      Breath sounds: Normal breath sounds.   Abdominal:      Palpations: Abdomen is soft.      Tenderness: There is no abdominal tenderness.   Musculoskeletal:         General: Normal range of motion.      Cervical back: Normal range of motion.      Right lower leg: No edema.      Left lower leg: No edema.   Lymphadenopathy:      Cervical: No cervical adenopathy.   Skin:     General: Skin is warm and dry.      Capillary Refill: Capillary refill takes less than 2 seconds.   Neurological:      General: No focal deficit present.      Mental Status: She is alert and oriented to person, place, and time.   Psychiatric:         Mood and Affect: Mood normal.         Behavior: Behavior normal.         Thought Content: Thought content normal.         Judgment: Judgment normal.          PAT AIRWAY:   Airway:     Mallampati::  III    Neck ROM::  Full  normal        Visit Vitals  /90 Comment: manual recheck   Pulse 79   Temp 36.7 °C (98.1 °F)       DASI Risk Score      Flowsheet Row Most Recent Value   DASI SCORE 36.7   METS Score (Will be calculated only when all the questions are answered) 7.3          Caprini DVT Assessment      Flowsheet Row Most Recent Value   DVT Score 8   Current Status Major surgery planned, lasting over 3 hours   Age 60-75 years   BMI 30 or less          Modified Frailty Index      Flowsheet Row Most Recent Value   Modified Frailty Index Calculator .[id number]          CHADS2 Stroke Risk  Current as of 24 minutes ago        N/A 3 to 100%: High Risk   2 to < 3%: Medium  Risk   0 to < 2%: Low Risk     Last Change: N/A          This score determines the patient's risk of having a stroke if the patient has atrial fibrillation.        This score is not applicable to this patient. Components are not calculated.          Revised Cardiac Risk Index      Flowsheet Row Most Recent Value   Revised Cardiac Risk Calculator 2          Apfel Simplified Score      Flowsheet Row Most Recent Value   Apfel Simplified Score Calculator 3          Risk Analysis Index Results This Encounter    No data found in the last 1 encounters.       Stop Bang Score      Flowsheet Row Most Recent Value   Do you snore loudly? 0   Do you often feel tired or fatigued after your sleep? 0   Has anyone ever observed you stop breathing in your sleep? 0   Do you have or are you being treated for high blood pressure? 1   Recent BMI (Calculated) 26.7   Is BMI greater than 35 kg/m2? 0=No   Age older than 50 years old? 1=Yes   Is your neck circumference greater than 17 inches (Male) or 16 inches (Female)? 1   Gender - Male 0=No   STOP-BANG Total Score 3            Assessment and Plan:   Neuro:  anxiety and depression managed on escitalopram (continue).     The patient is at an increased risk for post operative delirium secondary to age >/= 65 and type and duration of surgery.  Preoperative brain exercise educational handout provided to patient.    The patient is at an increased risk for perioperative stroke secondary to cardiac disease, increased age, HTN, HLD, DM , female sex , general anesthesia, and op time >2.5 hours.     HEENT/Airway: No diagnosis or significant findings on chart review or clinical presentation and evaluation.     Cardiovascular:  CAD with MI status post CABG in 2014. Patient managed on pravastatin (continue) and carvedilol (continue).     Hypertension managed on amlodipine (continue) and losartan (dosed in the evening, patient holding dose evening before surgery). Hyperlipidemia managed on pravastatin  "(continue). EKG 07/29/24 with no acute changes noted and reviewed by cardiology. Patient with elevated BP in office today as she forgot to take her antihypertensives last night and this morning.  Manual recheck 150/90.      Patient follows with cardiology, Dr. Albino Gross, last visit 07/29/24 (full note in notes tab)-\"She is now diagnosed with ovarian cancer and is to have surgery next month. Rested clear for surgery cardiac wise. She is not complain of any symptoms of angina factors nor congestive heart failure. She does remain active. Her EKG is unchanged from previous EKGs. Last echocardiogram did show that her ejection fraction is diminished in the 40 to 45% range. She did have her open surgery approximately 10 years ago and is doing well from this. Feel cardiac wise she is doing well and will clear her for surgery. \"     Due to last echo in 2017 having EF 40-45% and murmur on right sternal border I have ordered an echo to be completed prior to surgery.     Echo 08/06/24 Alfredo  08/06/24 at 11am  CONCLUSIONS:   1. Poorly visualized anatomical structures due to suboptimal image quality.   2. Left ventricular ejection fraction is low normal, calculated by Don's biplane at 54%.   3. Spectral Doppler shows an impaired relaxation pattern of left ventricular diastolic filling.   4. Moderately increased left ventricular septal thickness.   5. There is low normal right ventricular systolic function.   6. Aortic valve sclerosis.   7. Mild to moderate aortic valve regurgitation.    METS are 7.3    RCRI  2 which is 10.1% 30 day risk of MACE (risk for cardiac death, nonfatal myocardial infarction, and nonfactal cardiac arrest    KWASI score which indicates a 0.6% risk of intraoperative or 30-day postoperative MACE      Pulmonary:  No diagnosis or significant findings on chart review or clinical presentation and evaluation however see below risk screening tools.  Preoperative deep breathing educational handout provided " to patient.    ARISCAT:  26    points which is a intermediate (13.3%) risk of in-hospital post-op pulmonary complications     PRODIGY: 8   points which is a intermediate risk of post op opioid induced respiratory depression episodes    STOP BANG:   3  points which is a intermediate risk for moderate to severe VEE. Patient to discuss risk factors with pcp post op.     Renal: CKD stage III baseline creatinine 1.3-1.4.    The patient is at increased risk of perioperative renal complications secondary to age>/= 56, HTN, intraperitoneal surgery, and diabetes. Preventative measures include preoperative BP and DM control.     Endocrine:  IDDM managed on ozempic (last dose 08/04/24-dose weekly on Sundays- to hold 7 days), farxiga (hold 3 days) and tresiba (half dose night before surgery). A1c on 07/23/24 was 8.2%, due to indication for surgery, will not delay, patient instructed to observe tight glycemic control between now and surgery.      Hematologic:  No diagnosis or significant findings on chart review or clinical presentation and evaluation however see below risk screening tool.  Preoperative DVT educational handout provided to patient.    Caprini Score:  8  points which is a highest risk of perioperative VTE    Gastrointestinal:  No diagnosis or significant findings on chart review or clinical presentation and evaluation.     EAT-10 score of  0  - self-perceived oropharyngeal dysphagia scale (0-40)     Apfel: 3 points 61% risk for post operative N/V    Infectious disease:  No diagnosis or significant findings on chart review or clinical presentation and evaluation.      Musculoskeletal:  osteoarthritis managed with PRN pain relievers (hold NSAIDs 7 days).      Other:   uterine mass scheduled for surgery        Labs ordered  Recent Results (from the past 168 hour(s))   Type And Screen    Collection Time: 08/05/24  9:07 AM   Result Value Ref Range    ABO TYPE O     Rh TYPE POS     ANTIBODY SCREEN NEG    CBC     Collection Time: 08/05/24  9:07 AM   Result Value Ref Range    WBC 8.2 4.4 - 11.3 x10*3/uL    nRBC 0.0 0.0 - 0.0 /100 WBCs    RBC 5.16 4.00 - 5.20 x10*6/uL    Hemoglobin 14.4 12.0 - 16.0 g/dL    Hematocrit 44.2 36.0 - 46.0 %    MCV 86 80 - 100 fL    MCH 27.9 26.0 - 34.0 pg    MCHC 32.6 32.0 - 36.0 g/dL    RDW 12.2 11.5 - 14.5 %    Platelets 276 150 - 450 x10*3/uL   Basic Metabolic Panel    Collection Time: 08/05/24  9:07 AM   Result Value Ref Range    Glucose 187 (H) 74 - 99 mg/dL    Sodium 140 136 - 145 mmol/L    Potassium 4.4 3.5 - 5.3 mmol/L    Chloride 103 98 - 107 mmol/L    Bicarbonate 27 21 - 32 mmol/L    Anion Gap 14 10 - 20 mmol/L    Urea Nitrogen 24 (H) 6 - 23 mg/dL    Creatinine 1.37 (H) 0.50 - 1.05 mg/dL    eGFR 42 (L) >60 mL/min/1.73m*2    Calcium 9.4 8.6 - 10.6 mg/dL

## 2024-08-05 NOTE — PREPROCEDURE INSTRUCTIONS
Thank you for visiting [facility] ([facility]) today for your pre-procedure evaluation, you were seen by     Samantha Meeson, MSN, NP-C  Adult-Gerontology Nurse Practitioner II  Department of Anesthesiology and Perioperative Medicine  Main phone [phone]  Direct phone [phone]  Fax [phone]     This summary includes instructions and information to aid you during your perioperative period.  Please read carefully. If you have any questions about your visit today, please call the number listed above.  If you become ill or have any changes to your health before your surgery, please contact your primary care provider and alert your surgeon.    Preparing for your Surgery       Exercises  Preoperative Deep Breathing Exercises  Why it is important to do deep breathing exercises before my surgery?  Deep breathing exercises strengthen your breathing muscles.  This helps you to recover after your surgery and decreases the chance of breathing complications.  How are the deep breathing exercises done?  Sit straight with your back supported.  Breathe in deeply and slowly through your nose. Your lower rib cage should expand and your abdomen may move forward.  Hold that breath for 3 to 5 seconds.  Breathe out through pursed lips, slowly and completely.  Rest and repeat 10 times every hour while awake.  Rest longer if you become dizzy or lightheaded.       Incentive Spirometer   You were provided with an incentive spirometer in [facility], please follow the below instructions.   You were not provided an incentive spirometer in [facility], please disregard the incentive spirometer instructions  What is an incentive spirometer?  An incentive spirometer is a device used before and after surgery to “exercise” your lungs.  It helps you to take deeper breaths to expand your lungs.  Below is an example of a basic incentive spirometer.  The device you receive may differ slightly but they all function the  same.    Why do I need to use an incentive spirometer?  Using your incentive spirometer prepares your lungs for surgery and helps prevent lung problems after surgery.  How do I use my incentive spirometer?  When you're using your incentive spirometer, make sure to breathe through your mouth. If you breathe through your nose, the incentive spirometer won't work properly. You can hold your nose if you have trouble.  If you feel dizzy at any time, stop and rest. Try again at a later time.  Follow the steps below:  Set up your incentive spirometer, expand the flexible tubing and connect to the outlet.  Sit upright in a chair or bed. Hold the incentive spirometer at eye level.   Put the mouthpiece in your mouth and close your lips tightly around it. Slowly breathe out (exhale) completely.  Breathe in (inhale) slowly through your mouth as deeply as you can. As you take a breath, you will see the piston rise inside the large column. While the piston rises, the indicator should move upwards. It should stay in between the 2 arrows (see Figure).  Try to get the piston as high as you can, while keeping the indicator between the arrows.   If the indicator doesn't stay between the arrows, you're breathing either too fast or too slow.  When you get it as high as you can, hold your breath for 10 seconds, or as long as possible. While you're holding your breath, the piston will slowly fall to the base of the spirometer.  Once the piston reaches the bottom of the spirometer, breathe out slowly through your mouth. Rest for a few seconds.  Repeat 10 times. Try to get the piston to the same level with each breath.  Repeat every hour while awake  You can carefully clean the outside of the mouthpiece with an alcohol wipe or soap and water.      Preoperative Brain Exercises    What are brain exercises?  A brain exercise is any activity that engages your thinking (cognitive) skills.    What types of activities are considered brain  exercises?  Jigsaw puzzles, crossword puzzles, word jumble, memory games, word search, and many more.  Many can be found free online or on your phone via a mobile maryann.    Why should I do brain exercises before my surgery?  More recent research has shown brain exercise before surgery can lower the risk of postoperative delirium (confusion) which can be especially important for older adults.  Patients who did brain exercises for 5 to 10 hours the days before surgery, cut their risk of postoperative delirium in half up to 1 week after surgery.    Sit-to-Stand Exercise    What is the sit-to-stand exercise?  The sit-to-stand exercise strengthens the muscles of your lower body and muscles in the center of your body (core muscles for stability) helping to maintain and improve your strength and mobility.  How do I do the sit-to-stand exercise?  The goal is to do this exercise without using your arms or hands.  If this is too difficult, use your arms and hands or a chair with armrests to help slowly push yourself to the standing position and lower yourself back to the sitting position. As the movement becomes easier use your arms and hands less.    Steps to the sit-to-stand exercise  Sit up tall in a sturdy chair, knees bent, feet flat on the floor shoulder-width apart.  Shift your hips/pelvis forward in the chair to correctly position yourself for the next movement.  Lean forward at your hips.  Stand up straight putting equal weight on both feet.  Check to be sure you are properly aligned with the chair, in a slow controlled movement sit back down.  Repeat this exercise 10-15 times.  If needed you can do it fewer times until your strength improves.  Rest for 1 minute.  Do another 10-15 sit-to-stand exercises.  Try to do this in the morning and evening.        Instructions    Preoperative Fasting Guidelines    Why must I stop eating and drinking near surgery time?  With sedation, food or liquid in your stomach can enter your  lungs causing serious complications  Food can increase nausea and vomiting  When do I need to stop eating and drinking before my surgery?      Do not eat any food after midnight the night before your surgery/procedure. You may have up to 13.5 ounces of clear liquid until TWO hours before your instructed arrival time to the hospital.  This includes water, black tea/coffee, (no milk or cream) apple juice, and electrolyte drinks (Gatorade). You may chew gum until TWO hours before your surgery/procedure            Simple things you can do to help prevent blood clots     Blood clots are blockages that can form in the body's veins. When a blood clot forms in your deep veins, it may be called a deep vein thrombosis, or DVT for short. Blood clots can happen in any part of the body where blood flows, but they are most common in the arms and legs. If a piece of a blood clot breaks free and travels to the lungs, it is called a pulmonary embolus (PE). A PE can be a very serious problem.         Being in the hospital or having surgery can raise your chances of getting a blood clot because you may not be well enough to move around as much as you normally do.         Ways you can help prevent blood clots in the hospital       Wearing SCDs  SCDs stands for Sequential Compression Devices.   SCDs are special sleeves that wrap around your legs. They attach to a pump that fills them with air to gently squeeze your legs every few minutes.  This helps return the blood in your legs to your heart.   SCDs should only be taken off when walking or bathing. SCDs may not be comfortable, but they can help save your life.              Pump SCD leg sleeves  Wearing compression stockings - if your doctor orders them. These special snug-fitting stockings gently squeeze your legs to help blood flow.       Walking. Walking helps move the blood in your legs.   If your doctor says it is ok, try walking the halls at least   5 times a day. Ask us to help  you get up, so you don't fall.      Taking any blood-thinning medicines your doctor orders.              Ways you can help prevent blood clots at home         Wearing compression stockings - if your doctor orders them.   Walking - to help move the blood in your legs.    Taking any blood-thinning medicines your doctor orders.      Signs of a blood clot or PE    Tell your doctor or nurse right away if you have any of the problems listed below.         If you are at home, seek medical care right away. Call 911 for chest pain or problems breathing.            Signs of a blood clot (DVT) - such as pain, swelling, redness, or warmth in your arm or legs.  Signs of a pulmonary embolism (PE) - such as chest pain or feeling short of breath      Tobacco and Alcohol;  Do not drink alcohol or smoke within 24 hours of surgery.  It is best to quit smoking for as long as possible before any surgery or procedure.        The Week before Surgery        Seven days before Surgery  Check your [facility] medication instructions  Do the exercises provided to you by [facility]   Arrange for a responsible, adult licensed  to take you home after surgery and stay with you for 24 hours.  You will not be permitted to drive yourself home if you have received any anesthetic/sedation  Six days before surgery  Check your [facility] medication instructions  Do the exercises provided to you by [facility]   Start using Chlorhexidene (CHG) body wash if prescribed  Five days before surgery  Check your [facility] medication instructions  Do the exercises provided to you by [facility]   Continue to use CHG body wash if prescribed  Three days before surgery  Check your [facility] medication instructions  Do the exercises provided to you by [facility]   Continue to use CHG body wash if prescribed  Two days before surgery  Check your [facility] medication instructions  Do the exercises provided to you by [facility]   Continue to use CHG body wash if prescribed    The Day before Surgery       Check your [facility] medication and  all other [facility] instructions including when to stop eating and drinking  You will be called with your arrival time for surgery in the late afternoon.  If you do not receive a call please reach out to your surgeon's office.  Do not smoke or drink 24 hours before surgery  Prepare items to bring with you to the hospital  Shower with your chlorhexidine wash if prescribed  Brush your teeth and use your chlorhexidine dental rinse if prescribed    The Day of Surgery       Check your [facility] medication instructions  Ensure you follow the instructions for when to stop eating and drinking  Shower, if prescribed use CHG.  Do not apply any lotions, creams, moisturizers, perfume or deodorant  Brush your teeth and use your CHG dental rinse if prescribed  Wear loose comfortable clothing  Avoid make-up  Remove  jewelry and piercings, consider professional piercing removal with a plastic spacer if needed  Bring photo ID and Insurance card  Bring an accurate medication list that includes medication dose, frequency and allergies  Bring a copy of your advanced directives (will, health care power of )  Bring any devices and controllers as well as medical devices you have been provided with for surgery (CPAP, slings, braces, etc.)  Dentures, eyeglasses, and contacts will be removed before surgery, please bring cases for contacts or glasses

## 2024-08-06 ENCOUNTER — HOSPITAL ENCOUNTER (OUTPATIENT)
Dept: CARDIOLOGY | Facility: HOSPITAL | Age: 69
Discharge: HOME | End: 2024-08-06
Payer: COMMERCIAL

## 2024-08-06 DIAGNOSIS — Z01.810 ENCOUNTER FOR PREPROCEDURAL CARDIOVASCULAR EXAMINATION: ICD-10-CM

## 2024-08-06 DIAGNOSIS — R01.1 MURMUR, CARDIAC: ICD-10-CM

## 2024-08-06 DIAGNOSIS — Z01.818 PREOPERATIVE EXAMINATION: ICD-10-CM

## 2024-08-06 PROCEDURE — 93306 TTE W/DOPPLER COMPLETE: CPT

## 2024-08-06 PROCEDURE — 93306 TTE W/DOPPLER COMPLETE: CPT | Performed by: STUDENT IN AN ORGANIZED HEALTH CARE EDUCATION/TRAINING PROGRAM

## 2024-08-08 LAB
AORTIC VALVE MEAN GRADIENT: 6.4 MMHG
AORTIC VALVE PEAK VELOCITY: 1.66 M/S
AV PEAK GRADIENT: 11 MMHG
AVA (PEAK VEL): 1.57 CM2
AVA (VTI): 1.67 CM2
EJECTION FRACTION APICAL 4 CHAMBER: 44.8
EJECTION FRACTION: 54 %
LEFT ATRIUM VOLUME AREA LENGTH INDEX BSA: 32.4 ML/M2
LEFT VENTRICLE INTERNAL DIMENSION DIASTOLE: 4.3 CM (ref 3.5–6)
LEFT VENTRICULAR OUTFLOW TRACT DIAMETER: 2.01 CM
MITRAL VALVE E/A RATIO: 0.51
RIGHT VENTRICLE FREE WALL PEAK S': 8 CM/S
TRICUSPID ANNULAR PLANE SYSTOLIC EXCURSION: 1 CM

## 2024-08-13 ENCOUNTER — ANESTHESIA EVENT (OUTPATIENT)
Dept: OPERATING ROOM | Facility: HOSPITAL | Age: 69
End: 2024-08-13
Payer: COMMERCIAL

## 2024-08-14 ENCOUNTER — ANESTHESIA (OUTPATIENT)
Dept: OPERATING ROOM | Facility: HOSPITAL | Age: 69
End: 2024-08-14
Payer: COMMERCIAL

## 2024-08-14 ENCOUNTER — HOSPITAL ENCOUNTER (OUTPATIENT)
Facility: HOSPITAL | Age: 69
LOS: 1 days | Discharge: HOME | End: 2024-08-15
Attending: STUDENT IN AN ORGANIZED HEALTH CARE EDUCATION/TRAINING PROGRAM | Admitting: STUDENT IN AN ORGANIZED HEALTH CARE EDUCATION/TRAINING PROGRAM
Payer: COMMERCIAL

## 2024-08-14 DIAGNOSIS — N95.0 POSTMENOPAUSAL BLEEDING: ICD-10-CM

## 2024-08-14 DIAGNOSIS — Z98.890 POSTOPERATIVE STATE: ICD-10-CM

## 2024-08-14 DIAGNOSIS — N85.8 UTERINE MASS: Primary | ICD-10-CM

## 2024-08-14 LAB
ABO GROUP (TYPE) IN BLOOD: NORMAL
GLUCOSE BLD MANUAL STRIP-MCNC: 174 MG/DL (ref 74–99)
GLUCOSE BLD MANUAL STRIP-MCNC: 177 MG/DL (ref 74–99)
GLUCOSE BLD MANUAL STRIP-MCNC: 207 MG/DL (ref 74–99)
GLUCOSE BLD MANUAL STRIP-MCNC: 243 MG/DL (ref 74–99)
GLUCOSE BLD MANUAL STRIP-MCNC: 275 MG/DL (ref 74–99)
GLUCOSE BLD MANUAL STRIP-MCNC: 384 MG/DL (ref 74–99)
MAGNESIUM SERPL-MCNC: 2.15 MG/DL (ref 1.6–2.4)
RH FACTOR (ANTIGEN D): NORMAL

## 2024-08-14 PROCEDURE — 3700000002 HC GENERAL ANESTHESIA TIME - EACH INCREMENTAL 1 MINUTE: Performed by: STUDENT IN AN ORGANIZED HEALTH CARE EDUCATION/TRAINING PROGRAM

## 2024-08-14 PROCEDURE — 3600000009 HC OR TIME - EACH INCREMENTAL 1 MINUTE - PROCEDURE LEVEL FOUR: Performed by: STUDENT IN AN ORGANIZED HEALTH CARE EDUCATION/TRAINING PROGRAM

## 2024-08-14 PROCEDURE — 2500000004 HC RX 250 GENERAL PHARMACY W/ HCPCS (ALT 636 FOR OP/ED): Performed by: STUDENT IN AN ORGANIZED HEALTH CARE EDUCATION/TRAINING PROGRAM

## 2024-08-14 PROCEDURE — 3600000004 HC OR TIME - INITIAL BASE CHARGE - PROCEDURE LEVEL FOUR: Performed by: STUDENT IN AN ORGANIZED HEALTH CARE EDUCATION/TRAINING PROGRAM

## 2024-08-14 PROCEDURE — 58571 TLH W/T/O 250 G OR LESS: CPT | Performed by: STUDENT IN AN ORGANIZED HEALTH CARE EDUCATION/TRAINING PROGRAM

## 2024-08-14 PROCEDURE — 2500000005 HC RX 250 GENERAL PHARMACY W/O HCPCS

## 2024-08-14 PROCEDURE — 2500000005 HC RX 250 GENERAL PHARMACY W/O HCPCS: Performed by: STUDENT IN AN ORGANIZED HEALTH CARE EDUCATION/TRAINING PROGRAM

## 2024-08-14 PROCEDURE — 2500000004 HC RX 250 GENERAL PHARMACY W/ HCPCS (ALT 636 FOR OP/ED)

## 2024-08-14 PROCEDURE — 7100000001 HC RECOVERY ROOM TIME - INITIAL BASE CHARGE: Performed by: STUDENT IN AN ORGANIZED HEALTH CARE EDUCATION/TRAINING PROGRAM

## 2024-08-14 PROCEDURE — 38900 IO MAP OF SENT LYMPH NODE: CPT | Performed by: STUDENT IN AN ORGANIZED HEALTH CARE EDUCATION/TRAINING PROGRAM

## 2024-08-14 PROCEDURE — 7100000002 HC RECOVERY ROOM TIME - EACH INCREMENTAL 1 MINUTE: Performed by: STUDENT IN AN ORGANIZED HEALTH CARE EDUCATION/TRAINING PROGRAM

## 2024-08-14 PROCEDURE — 82947 ASSAY GLUCOSE BLOOD QUANT: CPT

## 2024-08-14 PROCEDURE — 2500000001 HC RX 250 WO HCPCS SELF ADMINISTERED DRUGS (ALT 637 FOR MEDICARE OP): Performed by: STUDENT IN AN ORGANIZED HEALTH CARE EDUCATION/TRAINING PROGRAM

## 2024-08-14 PROCEDURE — 99222 1ST HOSP IP/OBS MODERATE 55: CPT

## 2024-08-14 PROCEDURE — 88307 TISSUE EXAM BY PATHOLOGIST: CPT | Performed by: PATHOLOGY

## 2024-08-14 PROCEDURE — 2720000007 HC OR 272 NO HCPCS: Performed by: STUDENT IN AN ORGANIZED HEALTH CARE EDUCATION/TRAINING PROGRAM

## 2024-08-14 PROCEDURE — 2500000001 HC RX 250 WO HCPCS SELF ADMINISTERED DRUGS (ALT 637 FOR MEDICARE OP): Performed by: ANESTHESIOLOGY

## 2024-08-14 PROCEDURE — 96372 THER/PROPH/DIAG INJ SC/IM: CPT | Performed by: STUDENT IN AN ORGANIZED HEALTH CARE EDUCATION/TRAINING PROGRAM

## 2024-08-14 PROCEDURE — 36415 COLL VENOUS BLD VENIPUNCTURE: CPT | Performed by: STUDENT IN AN ORGANIZED HEALTH CARE EDUCATION/TRAINING PROGRAM

## 2024-08-14 PROCEDURE — 83735 ASSAY OF MAGNESIUM: CPT | Performed by: STUDENT IN AN ORGANIZED HEALTH CARE EDUCATION/TRAINING PROGRAM

## 2024-08-14 PROCEDURE — 2500000002 HC RX 250 W HCPCS SELF ADMINISTERED DRUGS (ALT 637 FOR MEDICARE OP, ALT 636 FOR OP/ED): Performed by: STUDENT IN AN ORGANIZED HEALTH CARE EDUCATION/TRAINING PROGRAM

## 2024-08-14 PROCEDURE — 2500000001 HC RX 250 WO HCPCS SELF ADMINISTERED DRUGS (ALT 637 FOR MEDICARE OP): Performed by: NURSE PRACTITIONER

## 2024-08-14 PROCEDURE — 2500000002 HC RX 250 W HCPCS SELF ADMINISTERED DRUGS (ALT 637 FOR MEDICARE OP, ALT 636 FOR OP/ED)

## 2024-08-14 PROCEDURE — 38570 LAPAROSCOPY LYMPH NODE BIOP: CPT | Performed by: STUDENT IN AN ORGANIZED HEALTH CARE EDUCATION/TRAINING PROGRAM

## 2024-08-14 PROCEDURE — 88309 TISSUE EXAM BY PATHOLOGIST: CPT | Performed by: PATHOLOGY

## 2024-08-14 PROCEDURE — 88307 TISSUE EXAM BY PATHOLOGIST: CPT | Mod: TC,SUR | Performed by: STUDENT IN AN ORGANIZED HEALTH CARE EDUCATION/TRAINING PROGRAM

## 2024-08-14 PROCEDURE — 1170000001 HC PRIVATE ONCOLOGY ROOM DAILY

## 2024-08-14 PROCEDURE — 96372 THER/PROPH/DIAG INJ SC/IM: CPT

## 2024-08-14 PROCEDURE — 3700000001 HC GENERAL ANESTHESIA TIME - INITIAL BASE CHARGE: Performed by: STUDENT IN AN ORGANIZED HEALTH CARE EDUCATION/TRAINING PROGRAM

## 2024-08-14 PROCEDURE — 2500000005 HC RX 250 GENERAL PHARMACY W/O HCPCS: Performed by: ANESTHESIOLOGY

## 2024-08-14 RX ORDER — INSULIN LISPRO 100 [IU]/ML
0-15 INJECTION, SOLUTION INTRAVENOUS; SUBCUTANEOUS
Status: DISCONTINUED | OUTPATIENT
Start: 2024-08-14 | End: 2024-08-14

## 2024-08-14 RX ORDER — ESCITALOPRAM OXALATE 10 MG/1
10 TABLET ORAL DAILY
Status: DISCONTINUED | OUTPATIENT
Start: 2024-08-14 | End: 2024-08-15 | Stop reason: HOSPADM

## 2024-08-14 RX ORDER — SODIUM CHLORIDE, SODIUM LACTATE, POTASSIUM CHLORIDE, CALCIUM CHLORIDE 600; 310; 30; 20 MG/100ML; MG/100ML; MG/100ML; MG/100ML
40 INJECTION, SOLUTION INTRAVENOUS CONTINUOUS
Status: DISCONTINUED | OUTPATIENT
Start: 2024-08-14 | End: 2024-08-15

## 2024-08-14 RX ORDER — ALBUTEROL SULFATE 0.83 MG/ML
2.5 SOLUTION RESPIRATORY (INHALATION) ONCE AS NEEDED
Status: DISCONTINUED | OUTPATIENT
Start: 2024-08-14 | End: 2024-08-14 | Stop reason: HOSPADM

## 2024-08-14 RX ORDER — SIMETHICONE 80 MG
80 TABLET,CHEWABLE ORAL 4 TIMES DAILY PRN
Status: DISCONTINUED | OUTPATIENT
Start: 2024-08-14 | End: 2024-08-15 | Stop reason: HOSPADM

## 2024-08-14 RX ORDER — POLYETHYLENE GLYCOL 3350 17 G/17G
17 POWDER, FOR SOLUTION ORAL DAILY PRN
COMMUNITY
Start: 2024-08-14

## 2024-08-14 RX ORDER — AMOXICILLIN 250 MG
1 CAPSULE ORAL 2 TIMES DAILY
Qty: 60 TABLET | Refills: 0 | Status: SHIPPED | OUTPATIENT
Start: 2024-08-14 | End: 2024-09-13

## 2024-08-14 RX ORDER — DROPERIDOL 2.5 MG/ML
INJECTION, SOLUTION INTRAMUSCULAR; INTRAVENOUS AS NEEDED
Status: DISCONTINUED | OUTPATIENT
Start: 2024-08-14 | End: 2024-08-14

## 2024-08-14 RX ORDER — SODIUM CHLORIDE, SODIUM LACTATE, POTASSIUM CHLORIDE, CALCIUM CHLORIDE 600; 310; 30; 20 MG/100ML; MG/100ML; MG/100ML; MG/100ML
INJECTION, SOLUTION INTRAVENOUS CONTINUOUS PRN
Status: DISCONTINUED | OUTPATIENT
Start: 2024-08-14 | End: 2024-08-14

## 2024-08-14 RX ORDER — LIDOCAINE HYDROCHLORIDE 10 MG/ML
0.1 INJECTION, SOLUTION EPIDURAL; INFILTRATION; INTRACAUDAL; PERINEURAL ONCE
Status: DISCONTINUED | OUTPATIENT
Start: 2024-08-14 | End: 2024-08-14 | Stop reason: HOSPADM

## 2024-08-14 RX ORDER — SODIUM CHLORIDE 0.9 G/100ML
IRRIGANT IRRIGATION AS NEEDED
Status: DISCONTINUED | OUTPATIENT
Start: 2024-08-14 | End: 2024-08-14 | Stop reason: HOSPADM

## 2024-08-14 RX ORDER — HEPARIN SODIUM 5000 [USP'U]/ML
5000 INJECTION, SOLUTION INTRAVENOUS; SUBCUTANEOUS ONCE
Status: COMPLETED | OUTPATIENT
Start: 2024-08-14 | End: 2024-08-14

## 2024-08-14 RX ORDER — CARVEDILOL 12.5 MG/1
12.5 TABLET ORAL 2 TIMES DAILY
Status: DISCONTINUED | OUTPATIENT
Start: 2024-08-14 | End: 2024-08-15 | Stop reason: HOSPADM

## 2024-08-14 RX ORDER — PHENYLEPHRINE HCL IN 0.9% NACL 0.4MG/10ML
SYRINGE (ML) INTRAVENOUS AS NEEDED
Status: DISCONTINUED | OUTPATIENT
Start: 2024-08-14 | End: 2024-08-14

## 2024-08-14 RX ORDER — POLYETHYLENE GLYCOL 3350 17 G/17G
17 POWDER, FOR SOLUTION ORAL DAILY
Status: DISCONTINUED | OUTPATIENT
Start: 2024-08-14 | End: 2024-08-15 | Stop reason: HOSPADM

## 2024-08-14 RX ORDER — INSULIN LISPRO 100 [IU]/ML
0-15 INJECTION, SOLUTION INTRAVENOUS; SUBCUTANEOUS
Status: DISCONTINUED | OUTPATIENT
Start: 2024-08-14 | End: 2024-08-15 | Stop reason: HOSPADM

## 2024-08-14 RX ORDER — ACETAMINOPHEN 325 MG/1
975 TABLET ORAL ONCE
Status: COMPLETED | OUTPATIENT
Start: 2024-08-14 | End: 2024-08-14

## 2024-08-14 RX ORDER — OXYCODONE HYDROCHLORIDE 5 MG/1
5 TABLET ORAL EVERY 4 HOURS PRN
Status: DISCONTINUED | OUTPATIENT
Start: 2024-08-14 | End: 2024-08-15 | Stop reason: HOSPADM

## 2024-08-14 RX ORDER — DEXTROSE 50 % IN WATER (D50W) INTRAVENOUS SYRINGE
12.5
Status: DISCONTINUED | OUTPATIENT
Start: 2024-08-14 | End: 2024-08-15 | Stop reason: HOSPADM

## 2024-08-14 RX ORDER — FENTANYL CITRATE 50 UG/ML
INJECTION, SOLUTION INTRAMUSCULAR; INTRAVENOUS AS NEEDED
Status: DISCONTINUED | OUTPATIENT
Start: 2024-08-14 | End: 2024-08-14

## 2024-08-14 RX ORDER — DROPERIDOL 2.5 MG/ML
0.62 INJECTION, SOLUTION INTRAMUSCULAR; INTRAVENOUS ONCE AS NEEDED
Status: DISCONTINUED | OUTPATIENT
Start: 2024-08-14 | End: 2024-08-14 | Stop reason: HOSPADM

## 2024-08-14 RX ORDER — AMLODIPINE BESYLATE 10 MG/1
10 TABLET ORAL DAILY
Status: DISCONTINUED | OUTPATIENT
Start: 2024-08-14 | End: 2024-08-15 | Stop reason: HOSPADM

## 2024-08-14 RX ORDER — MIDAZOLAM HYDROCHLORIDE 1 MG/ML
INJECTION INTRAMUSCULAR; INTRAVENOUS AS NEEDED
Status: DISCONTINUED | OUTPATIENT
Start: 2024-08-14 | End: 2024-08-14

## 2024-08-14 RX ORDER — OXYCODONE HYDROCHLORIDE 5 MG/1
5 TABLET ORAL EVERY 6 HOURS PRN
Qty: 12 TABLET | Refills: 0 | Status: SHIPPED | OUTPATIENT
Start: 2024-08-14 | End: 2024-08-17

## 2024-08-14 RX ORDER — ACETAMINOPHEN 325 MG/1
975 TABLET ORAL EVERY 6 HOURS
Qty: 84 TABLET | Refills: 0 | Status: SHIPPED | OUTPATIENT
Start: 2024-08-14 | End: 2024-08-21

## 2024-08-14 RX ORDER — LIDOCAINE HYDROCHLORIDE 20 MG/ML
INJECTION, SOLUTION INFILTRATION; PERINEURAL AS NEEDED
Status: DISCONTINUED | OUTPATIENT
Start: 2024-08-14 | End: 2024-08-14

## 2024-08-14 RX ORDER — WATER 1 ML/ML
IRRIGANT IRRIGATION AS NEEDED
Status: DISCONTINUED | OUTPATIENT
Start: 2024-08-14 | End: 2024-08-14 | Stop reason: HOSPADM

## 2024-08-14 RX ORDER — ROCURONIUM BROMIDE 10 MG/ML
INJECTION, SOLUTION INTRAVENOUS AS NEEDED
Status: DISCONTINUED | OUTPATIENT
Start: 2024-08-14 | End: 2024-08-14

## 2024-08-14 RX ORDER — CELECOXIB 200 MG/1
400 CAPSULE ORAL ONCE
Status: COMPLETED | OUTPATIENT
Start: 2024-08-14 | End: 2024-08-14

## 2024-08-14 RX ORDER — ONDANSETRON HYDROCHLORIDE 2 MG/ML
4 INJECTION, SOLUTION INTRAVENOUS EVERY 6 HOURS PRN
Status: DISCONTINUED | OUTPATIENT
Start: 2024-08-14 | End: 2024-08-15 | Stop reason: HOSPADM

## 2024-08-14 RX ORDER — DEXTROSE 50 % IN WATER (D50W) INTRAVENOUS SYRINGE
25
Status: DISCONTINUED | OUTPATIENT
Start: 2024-08-14 | End: 2024-08-15 | Stop reason: HOSPADM

## 2024-08-14 RX ORDER — HYDROMORPHONE HYDROCHLORIDE 1 MG/ML
0.5 INJECTION, SOLUTION INTRAMUSCULAR; INTRAVENOUS; SUBCUTANEOUS EVERY 5 MIN PRN
Status: DISCONTINUED | OUTPATIENT
Start: 2024-08-14 | End: 2024-08-14 | Stop reason: HOSPADM

## 2024-08-14 RX ORDER — GABAPENTIN 600 MG/1
600 TABLET ORAL ONCE
Status: COMPLETED | OUTPATIENT
Start: 2024-08-14 | End: 2024-08-14

## 2024-08-14 RX ORDER — MIDAZOLAM HYDROCHLORIDE 1 MG/ML
1 INJECTION INTRAMUSCULAR; INTRAVENOUS ONCE AS NEEDED
Status: DISCONTINUED | OUTPATIENT
Start: 2024-08-14 | End: 2024-08-14 | Stop reason: HOSPADM

## 2024-08-14 RX ORDER — SIMETHICONE 80 MG
80 TABLET,CHEWABLE ORAL 4 TIMES DAILY PRN
COMMUNITY
Start: 2024-08-14

## 2024-08-14 RX ORDER — NALOXONE HYDROCHLORIDE 0.4 MG/ML
0.1 INJECTION, SOLUTION INTRAMUSCULAR; INTRAVENOUS; SUBCUTANEOUS EVERY 5 MIN PRN
Status: DISCONTINUED | OUTPATIENT
Start: 2024-08-14 | End: 2024-08-15 | Stop reason: HOSPADM

## 2024-08-14 RX ORDER — MEPERIDINE HYDROCHLORIDE 25 MG/ML
12.5 INJECTION INTRAMUSCULAR; INTRAVENOUS; SUBCUTANEOUS EVERY 10 MIN PRN
Status: DISCONTINUED | OUTPATIENT
Start: 2024-08-14 | End: 2024-08-14 | Stop reason: HOSPADM

## 2024-08-14 RX ORDER — ONDANSETRON HYDROCHLORIDE 2 MG/ML
INJECTION, SOLUTION INTRAVENOUS AS NEEDED
Status: DISCONTINUED | OUTPATIENT
Start: 2024-08-14 | End: 2024-08-14

## 2024-08-14 RX ORDER — AMOXICILLIN 250 MG
1 CAPSULE ORAL 2 TIMES DAILY
Status: DISCONTINUED | OUTPATIENT
Start: 2024-08-14 | End: 2024-08-15 | Stop reason: HOSPADM

## 2024-08-14 RX ORDER — LABETALOL HYDROCHLORIDE 5 MG/ML
5 INJECTION, SOLUTION INTRAVENOUS ONCE AS NEEDED
Status: DISCONTINUED | OUTPATIENT
Start: 2024-08-14 | End: 2024-08-14 | Stop reason: HOSPADM

## 2024-08-14 RX ORDER — ROPIVACAINE HYDROCHLORIDE 5 MG/ML
INJECTION, SOLUTION EPIDURAL; INFILTRATION; PERINEURAL AS NEEDED
Status: DISCONTINUED | OUTPATIENT
Start: 2024-08-14 | End: 2024-08-14

## 2024-08-14 RX ORDER — SODIUM CHLORIDE, SODIUM LACTATE, POTASSIUM CHLORIDE, CALCIUM CHLORIDE 600; 310; 30; 20 MG/100ML; MG/100ML; MG/100ML; MG/100ML
100 INJECTION, SOLUTION INTRAVENOUS CONTINUOUS
Status: DISCONTINUED | OUTPATIENT
Start: 2024-08-14 | End: 2024-08-14 | Stop reason: HOSPADM

## 2024-08-14 RX ORDER — ACETAMINOPHEN 325 MG/1
975 TABLET ORAL EVERY 6 HOURS
Status: DISCONTINUED | OUTPATIENT
Start: 2024-08-14 | End: 2024-08-15 | Stop reason: HOSPADM

## 2024-08-14 RX ORDER — METHOCARBAMOL 100 MG/ML
1000 INJECTION, SOLUTION INTRAMUSCULAR; INTRAVENOUS ONCE
Status: DISCONTINUED | OUTPATIENT
Start: 2024-08-14 | End: 2024-08-14 | Stop reason: HOSPADM

## 2024-08-14 RX ORDER — OXYCODONE HYDROCHLORIDE 5 MG/1
5 TABLET ORAL EVERY 4 HOURS PRN
Status: DISCONTINUED | OUTPATIENT
Start: 2024-08-14 | End: 2024-08-14 | Stop reason: HOSPADM

## 2024-08-14 RX ORDER — CEFAZOLIN 1 G/1
INJECTION, POWDER, FOR SOLUTION INTRAVENOUS AS NEEDED
Status: DISCONTINUED | OUTPATIENT
Start: 2024-08-14 | End: 2024-08-14

## 2024-08-14 RX ORDER — APREPITANT 40 MG/1
CAPSULE ORAL AS NEEDED
Status: DISCONTINUED | OUTPATIENT
Start: 2024-08-14 | End: 2024-08-14

## 2024-08-14 RX ORDER — PROPOFOL 10 MG/ML
INJECTION, EMULSION INTRAVENOUS AS NEEDED
Status: DISCONTINUED | OUTPATIENT
Start: 2024-08-14 | End: 2024-08-14

## 2024-08-14 RX ORDER — HYDROMORPHONE HYDROCHLORIDE 1 MG/ML
0.2 INJECTION, SOLUTION INTRAMUSCULAR; INTRAVENOUS; SUBCUTANEOUS EVERY 5 MIN PRN
Status: DISCONTINUED | OUTPATIENT
Start: 2024-08-14 | End: 2024-08-14 | Stop reason: HOSPADM

## 2024-08-14 SDOH — ECONOMIC STABILITY: FOOD INSECURITY: WITHIN THE PAST 12 MONTHS, THE FOOD YOU BOUGHT JUST DIDN'T LAST AND YOU DIDN'T HAVE MONEY TO GET MORE.: NEVER TRUE

## 2024-08-14 SDOH — SOCIAL STABILITY: SOCIAL INSECURITY: DOES ANYONE TRY TO KEEP YOU FROM HAVING/CONTACTING OTHER FRIENDS OR DOING THINGS OUTSIDE YOUR HOME?: NO

## 2024-08-14 SDOH — ECONOMIC STABILITY: HOUSING INSECURITY: AT ANY TIME IN THE PAST 12 MONTHS, WERE YOU HOMELESS OR LIVING IN A SHELTER (INCLUDING NOW)?: NO

## 2024-08-14 SDOH — SOCIAL STABILITY: SOCIAL INSECURITY: ARE YOU OR HAVE YOU BEEN THREATENED OR ABUSED PHYSICALLY, EMOTIONALLY, OR SEXUALLY BY ANYONE?: NO

## 2024-08-14 SDOH — SOCIAL STABILITY: SOCIAL INSECURITY: ABUSE: ADULT

## 2024-08-14 SDOH — ECONOMIC STABILITY: HOUSING INSECURITY: IN THE PAST 12 MONTHS, HOW MANY TIMES HAVE YOU MOVED WHERE YOU WERE LIVING?: 0

## 2024-08-14 SDOH — SOCIAL STABILITY: SOCIAL INSECURITY: DO YOU FEEL ANYONE HAS EXPLOITED OR TAKEN ADVANTAGE OF YOU FINANCIALLY OR OF YOUR PERSONAL PROPERTY?: NO

## 2024-08-14 SDOH — SOCIAL STABILITY: SOCIAL INSECURITY: DO YOU FEEL UNSAFE GOING BACK TO THE PLACE WHERE YOU ARE LIVING?: NO

## 2024-08-14 SDOH — ECONOMIC STABILITY: TRANSPORTATION INSECURITY
IN THE PAST 12 MONTHS, HAS LACK OF TRANSPORTATION KEPT YOU FROM MEETINGS, WORK, OR FROM GETTING THINGS NEEDED FOR DAILY LIVING?: NO

## 2024-08-14 SDOH — ECONOMIC STABILITY: TRANSPORTATION INSECURITY
IN THE PAST 12 MONTHS, HAS THE LACK OF TRANSPORTATION KEPT YOU FROM MEDICAL APPOINTMENTS OR FROM GETTING MEDICATIONS?: NO

## 2024-08-14 SDOH — ECONOMIC STABILITY: INCOME INSECURITY: HOW HARD IS IT FOR YOU TO PAY FOR THE VERY BASICS LIKE FOOD, HOUSING, MEDICAL CARE, AND HEATING?: NOT HARD AT ALL

## 2024-08-14 SDOH — ECONOMIC STABILITY: FOOD INSECURITY: WITHIN THE PAST 12 MONTHS, YOU WORRIED THAT YOUR FOOD WOULD RUN OUT BEFORE YOU GOT MONEY TO BUY MORE.: NEVER TRUE

## 2024-08-14 SDOH — ECONOMIC STABILITY: INCOME INSECURITY: IN THE LAST 12 MONTHS, WAS THERE A TIME WHEN YOU WERE NOT ABLE TO PAY THE MORTGAGE OR RENT ON TIME?: NO

## 2024-08-14 SDOH — SOCIAL STABILITY: SOCIAL INSECURITY: HAVE YOU HAD ANY THOUGHTS OF HARMING ANYONE ELSE?: NO

## 2024-08-14 SDOH — SOCIAL STABILITY: SOCIAL INSECURITY: ARE THERE ANY APPARENT SIGNS OF INJURIES/BEHAVIORS THAT COULD BE RELATED TO ABUSE/NEGLECT?: NO

## 2024-08-14 SDOH — SOCIAL STABILITY: SOCIAL INSECURITY: WERE YOU ABLE TO COMPLETE ALL THE BEHAVIORAL HEALTH SCREENINGS?: YES

## 2024-08-14 SDOH — SOCIAL STABILITY: SOCIAL INSECURITY: HAVE YOU HAD THOUGHTS OF HARMING ANYONE ELSE?: NO

## 2024-08-14 SDOH — SOCIAL STABILITY: SOCIAL INSECURITY: HAS ANYONE EVER THREATENED TO HURT YOUR FAMILY OR YOUR PETS?: NO

## 2024-08-14 ASSESSMENT — LIFESTYLE VARIABLES
AUDIT-C TOTAL SCORE: 2
PRESCIPTION_ABUSE_PAST_12_MONTHS: NO
AUDIT-C TOTAL SCORE: 2
HOW MANY STANDARD DRINKS CONTAINING ALCOHOL DO YOU HAVE ON A TYPICAL DAY: 1 OR 2
SUBSTANCE_ABUSE_PAST_12_MONTHS: NO
SKIP TO QUESTIONS 9-10: 0
HOW OFTEN DO YOU HAVE 6 OR MORE DRINKS ON ONE OCCASION: LESS THAN MONTHLY
HOW OFTEN DO YOU HAVE A DRINK CONTAINING ALCOHOL: MONTHLY OR LESS

## 2024-08-14 ASSESSMENT — PAIN SCALES - GENERAL
PAINLEVEL_OUTOF10: 3
PAINLEVEL_OUTOF10: 3
PAIN_LEVEL: 0
PAINLEVEL_OUTOF10: 2
PAINLEVEL_OUTOF10: 0 - NO PAIN
PAINLEVEL_OUTOF10: 0 - NO PAIN
PAINLEVEL_OUTOF10: 4
PAINLEVEL_OUTOF10: 0 - NO PAIN
PAINLEVEL_OUTOF10: 2

## 2024-08-14 ASSESSMENT — PATIENT HEALTH QUESTIONNAIRE - PHQ9
SUM OF ALL RESPONSES TO PHQ9 QUESTIONS 1 & 2: 0
2. FEELING DOWN, DEPRESSED OR HOPELESS: NOT AT ALL
1. LITTLE INTEREST OR PLEASURE IN DOING THINGS: NOT AT ALL

## 2024-08-14 ASSESSMENT — COGNITIVE AND FUNCTIONAL STATUS - GENERAL
DAILY ACTIVITIY SCORE: 24
PATIENT BASELINE BEDBOUND: NO
MOBILITY SCORE: 24
MOBILITY SCORE: 24
DAILY ACTIVITIY SCORE: 24

## 2024-08-14 ASSESSMENT — ACTIVITIES OF DAILY LIVING (ADL)
PATIENT'S MEMORY ADEQUATE TO SAFELY COMPLETE DAILY ACTIVITIES?: YES
GROOMING: INDEPENDENT
HEARING - LEFT EAR: FUNCTIONAL
WALKS IN HOME: INDEPENDENT
JUDGMENT_ADEQUATE_SAFELY_COMPLETE_DAILY_ACTIVITIES: YES
ADEQUATE_TO_COMPLETE_ADL: YES
TOILETING: INDEPENDENT
BATHING: INDEPENDENT
FEEDING YOURSELF: INDEPENDENT
DRESSING YOURSELF: INDEPENDENT
HEARING - RIGHT EAR: FUNCTIONAL

## 2024-08-14 ASSESSMENT — PAIN - FUNCTIONAL ASSESSMENT
PAIN_FUNCTIONAL_ASSESSMENT: 0-10
PAIN_FUNCTIONAL_ASSESSMENT: UNABLE TO SELF-REPORT
PAIN_FUNCTIONAL_ASSESSMENT: 0-10

## 2024-08-14 ASSESSMENT — COLUMBIA-SUICIDE SEVERITY RATING SCALE - C-SSRS
2. HAVE YOU ACTUALLY HAD ANY THOUGHTS OF KILLING YOURSELF?: NO
6. HAVE YOU EVER DONE ANYTHING, STARTED TO DO ANYTHING, OR PREPARED TO DO ANYTHING TO END YOUR LIFE?: NO
1. IN THE PAST MONTH, HAVE YOU WISHED YOU WERE DEAD OR WISHED YOU COULD GO TO SLEEP AND NOT WAKE UP?: NO

## 2024-08-14 NOTE — OP NOTE
Hysterectomy Laparoscopy with Bilateral Salpingo-Oophorectomy (B), Allen lymph node mapping and biopsies, surgical staging, all indicated procedures (B) Operative Note     Date: 2024  OR Location: [facility] OR    Name: Simran Burroughs, : 1955, Age: 69 y.o., MRN: [id number], Sex: female    Diagnosis  Pre-op Diagnosis      * Uterine mass [N85.8]     * Postmenopausal bleeding [N95.0] Post-op Diagnosis     * Uterine mass [N85.8]     * Postmenopausal bleeding [N95.0]     Procedures  Hysterectomy Laparoscopy with Bilateral Salpingo-Oophorectomy  26997 - NM LAPS TOTAL HYSTERECT 250 GM/< W/RMVL TUBE/OVARY    Allen lymph node mapping and biopsies, surgical staging, all indicated procedures  14844 - NM LAPS SURG RETROPERITONEAL LYMPH NODE BX 1/MLT      Surgeons      * Yane Mustafa - Primary    Resident/Fellow/Other Assistant:  Surgeons and Role:  * No surgeons found with a matching role *    Procedure Summary  Anesthesia: General  ASA: III  Anesthesia Staff: Anesthesiologist: Osbaldo Alberts MD  C-AA: MIKEY Ross; MIKEY Arizmendi  [place]: Eleanor Rosenthal  Estimated Blood Loss: 35mL  Intra-op Medications:   Administrations occurring from 0815 to 1215 on 24:   Medication Name Total Dose   sodium chloride 0.9 % irrigation solution 1,000 mL   Unable to Find 6 mL   sterile water irrigation solution 1,000 mL              Anesthesia Record               Intraprocedure I/O Totals          Output    Urine 100 mL    Total Blood Loss - Surgical Delivery (mL) 35 mL    Total Output 135 mL       Other (could not be determined as input or output)    Surgical Delivery Estimated Blood Loss (mL) 35          Specimen:   ID Type Source Tests Collected by Time   1 : LEFT PELVIC SENTINEL LYMPH NODE Tissue SENTINEL LYMPH NODE, PELVIC SURGICAL PATHOLOGY EXAM Yane Mustafa MD 2024 0946   2 : RIGHT PELVIC SENTINEL LYMPH NODE Tissue SENTINEL LYMPH NODE, PELVIC SURGICAL PATHOLOGY EXAM Yane DARLING  MD Moon 8/14/2024 0949   3 : UTERUS CERVIX AND BILATERAL TUBES AND OVARIES Tissue UTERUS, CERVIX, FALLOPIAN TUBES AND OVARIES BILATERAL SURGICAL PATHOLOGY EXAM Yane Mustafa MD 8/14/2024 1012        Staff:   Circulator: Sarah  Circulator: Delilah Renee Person: Nay         Drains and/or Catheters:   [REMOVED] Urethral Catheter Non-latex 16 Fr. (Removed)       Tourniquet Times:         Implants:     Findings: 10w uterus with small mm size white lesions on surface. Normal fallopian tubes s/p BTL, ovaries, and cervix. Smooth peritoneal surfaces, smooth diaphragm, liver edge. Omental adhesion to the anterior abdominal wall at the level of the umbilicus. Bilateral mapping of the sentinel lymph nodes to the obturator space.     Indications: Simran Burroughs is an 69 y.o. female who is having surgery for Uterine mass [N85.8]  Postmenopausal bleeding [N95.0].     The patient was seen in the preoperative area. The risks, benefits, complications, treatment options, non-operative alternatives, expected recovery and outcomes were discussed with the patient. The possibilities of reaction to medication, pulmonary aspiration, injury to surrounding structures, bleeding, recurrent infection, the need for additional procedures, failure to diagnose a condition, and creating a complication requiring transfusion or operation were discussed with the patient. The patient concurred with the proposed plan, giving informed consent.  The site of surgery was properly noted/marked if necessary per policy. The patient has been actively warmed in preoperative area. Preoperative antibiotics have been ordered and given within 1 hours of incision. Venous thrombosis prophylaxis have been ordered including bilateral sequential compression devices and chemical prophylaxis    Procedure Details:   After informed consent was confirmed, the patient was taken to the operating room with an IV in place. A preoperative Huddle and timeout were  performed, with all OR personnel confirming correct patient and procedure. The patient was moved to the operating room table and SCDs were placed.  Heparin was administered.  General anesthesia was induced. She was then placed in the dorsal lithotomy position on the operating room table using Mcihael stirrups. She was prepped and draped in a normal sterile fashion for a laparoscopic hysterectomy. A surgical pause was performed. The patient's identity and surgical procedure were again confirmed by all the surgical personnel. The patient received preoperative antibiotics.    A lazo catheter was placed. We then started with the vaginal portion of the operation. A bivalve speculum was placed in the vagina, and the cervix was visualized.  Indocyanine green dye was then injected at 3 o’clock and 9 o’clock, 1cc superficially and 1cc deep at each location, for a total of 4cc.  A Why Not Give Back system was then placed as a uterine manipulator.  The speculum was then removed.      We then turned our attention to the laparoscopic portion of the operation after donning new sterile gloves. The skin superior to the supraumbilical area was infiltrated with local anesthetic. A 12mm horizontal skin incision was made. This was carried down to the level of the fascia with two S retractors. The fascia was elevated with 2 kocher clamps and incised with a scalpel.  Both sides of the fascia were then tagged with 0-vicryl suture.  The peritoneum was then elevated with 2 hemostats and was entered sharply with metzenbaum scissors.  The gauri port was then placed. CO2 was then insufflated into the abdomen.     Once this was accomplished, we then carefully inspected the abdomen and there was no evidence of injury. The patient was placed in deep Trendelenburg. We then placed three 5-mm accessory ports.  All were placed under direct visualization after infiltration with Marcaine.  The small bowel was manipulated out of the pelvis.     We then toggled  back and forth from regular view to firefly mode to perform a bilateral sentinel pelvic lymph node dissection. The uterus was placed on traction. The round ligaments were sealed and divided with the bovie device. The broad ligament was dissected cephalad and caudad, and a bladder flap was created. This was carried to the opposite side, where the round ligament was also divided. The bladder was dissected away from the cervix; it was noted to be free of disease. The pararectal and paravesical spaces were developed bilaterally. The parametria appeared free of disease bilaterally. Lymph nodes were then visualized bilaterally and harvested off of the bilateral external iliac vessels. Hemostasis was secure. We carefully inspected the areas and there was no evidence of bleeding. The obturator and genitofemoral nerves were  conserved.  The lymph nodes were removed through the laparoscopic port and sent to pathology for permanent section.     We then proceeded with the hysterectomy.   The infundibulopelvic ligaments were then isolated away from the ureters which were well visualized bilaterally.  A window was created between the two and the IP ligaments were cauterized and then divided with the ligasure device.      Adhesions were noted between the sigmoid colon and pelvic sidewall on the patient's left.  These were taken down with sharp dissection.  The anterior and posterior leaves of the broad ligament were bluntly .   At the vesicouterine fold the peritoneum was incised transversely and the bladder sharply dissected off the lower uterine segment and upper vagina.  The posterior peritoneum was gently brought down from the uterus.  The uterine vessels were skeletonized then cauterized and divided with the ligasure device followed by the cardinal ligaments and the uterosacral ligaments in a similar fashion.      We then began the colpotomy. Monopolar bovie was used and the cervix was released circumferentially over  the cervical cup.  The uterus, tubes and ovaries and cervix were delivered transvaginally without difficulty.       The cuff was closed laparoscopically with 0 Maxon v-loc suture.  Hemostasis was achieved.  The abdomen was copiously irrigated.  Alexandro was then placed in the lymph node beds and along the vaginal cuff.  Hemostasis was noted.     The 5mm ports were all removed under direct visualization.  The 12mm port was removed and the fascia was then closed with another figure-of-eight suture of 0-vicryl followed by tying the two previously-tagged end of fascia together.  The port sites were all irrigated.  Hemostasis was noted.  Ports were closed using 4-0 vicryl in a subcuticular fashion, followed by steristrips.      The lazo was removed and the balloon was removed from the vagina.     Sponge, needle, instrument counts were correct x 2.      Complications:  None; patient tolerated the procedure well.    Disposition: PACU - hemodynamically stable.  Condition: stable         Additional Details: None    Attending Attestation: I was present and scrubbed for the entire procedure.    Yane Mustafa  Phone Number: [phone]

## 2024-08-14 NOTE — H&P
History Of Present Illness     Simran Burroughs is a 69 y.o. female with uterine mass suspicious for malignancy and postmenopausal bleeding presenting for Hysterectomy Laparoscopy with Salpingo-Oophorectomy, [place] lymph node mapping and biopsies, surgical staging, all indicated procedures;     Preop labs (8/5/24): Hgb 14.4, Plt 276, Cr 1.37 (patient's baseline)    PAT (8/5/24): cleared, see note for risk scores    Cardiology evaluation 7/29/24: cleared for surgery     Tumor History:  6/4/24: pelvic ultrasound showing endometrial mass concerning for neoplasm  6/25/24: MRI pelvis showing olypoidal endometrial soft tissue mass measuring 3.8  x 2.9 cm with a evidence of surrounding foci of myometrial wall invasion (mainly along the right anterior aspect) along with upstream endometrial canal distention  7/11/24: EMB showing low grade enodmetroid type endometrial carcinoma     Pelvic US 6/4/24  IMPRESSION:  1. Large 6.1 cm complex cystic and frond-like solid mass involving  the majority of the uterus without discrete visualization of the  endometrium. Differential includes a neoplasm of endometrial origin,  versus less likely myometrial origin with obscuration of the  endometrium. Recommend correlation with endometrial biopsy and MRI  pelvis with and without contrast.      2. Normal right ovary. Left ovary not visualized.    MR Pelvis 6/25/24  IMPRESSION:  Postcontrast images are limited given motion artifact and body  habitus.  1. Infiltrative polypoidal endometrial soft tissue mass measuring 3.8  x 2.9 cm with a evidence of surrounding foci of myometrial wall  invasion (mainly along the right anterior aspect) along with upstream  endometrial canal distention. There is apparent asymmetric right  lower extra uterine soft tissue fullness beyond the uterine confine  highly concerning for extension. The mass is abutting the adjacent  small bowel loops but no definite invasion as well as no definite  urinary bladder  involvement. Findings are highly concerning for  endometrial carcinoma and advise tissue sampling  2. No grossly enlarged pelvic lymph nodes    EMB 2024  A. ENDOMETRIUM BIOPSY:   -- SCANT SMALL FRAGMENTS WITH FEATURES CONSISTENT WITH LOW-GRADE ENDOMETRIOID TYPE ENDOMETRIAL CARCINOMA EMBEDDED IN MUCUS, SEE COMMENT    PMH:   - HTN, HLD  - CAD s/p triple bypass (age 59) - Dr Gross in [place]  - Renal insufficiency   - Insulin-dependent diabetes   - Depression     PSH:  -bilateral cateract surgery     Ob/Gyn History:     FTSVD x2  - Menopausal age 56; +PMB x1 year   - Last Pap: uncertain      Social Hx:  she has never smoked. She has never used smokeless tobacco.  She  reports that she does not currently use alcohol after a past usage of about 2.0 standard drinks of alcohol per week. 1-2x per month   She  reports no history of drug use.  Works in [employer] ([employer]); lives with spouse and dogs     Fam Hx:  - Last colonoscopy 2020  - Last mammogram 2021 - Cat 2        Medications:  Lexapro 10mg   Pravastatin 20mg  Amlodipine 10mg    Allergies:  Naproxen sodium and Lisinopril    Review of Systems   Negative except as above    Physical Exam   General: no acute distress  HEENT: normocephalic, atraumatic  Heart: warm and well perfused  Lungs: no increased WOB  Abd: soft, nontender  Extremities: moving all extremities  Neuro: awake and conversant  Psych: appropriate mood and affect     Last Recorded Vitals  Lab Results   Component Value Date    WBC 8.2 2024    HGB 14.4 2024    HCT 44.2 2024    MCV 86 2024     2024     Lab Results   Component Value Date    GLUCOSE 187 (H) 2024    CALCIUM 9.4 2024     2024    K 4.4 2024    CO2 27 2024     2024    BUN 24 (H) 2024    CREATININE 1.37 (H) 2024          Assessment/Plan   Principal Problem:    Uterine mass  Active Problems:    Postmenopausal  bleeding     Simran Burroughs is a 69 y.o. female with uterine mass suspicious for malignancy and postmenopausal bleeding presenting for Hysterectomy Laparoscopy with Salpingo-Oophorectomy, [place] lymph node mapping and biopsies, surgical staging, all indicated procedures.  - Consent signed  - Has completed PAT and is appropriate for OR  - Pre-procedure medications ordered    Seen and d/w Dr. Moon Ribera MD PGY2

## 2024-08-14 NOTE — ANESTHESIA PREPROCEDURE EVALUATION
Patient: Simran Burroughs    Procedure Information       Date/Time: 08/14/24 0815    Procedures:       Hysterectomy Laparoscopy with Salpingo-Oophorectomy (Bilateral)      [place] lymph node mapping and biopsies, surgical staging, all indicated procedures (Bilateral)    Location: [facility] OR  / [facility] OR    Surgeons: Yane Mustafa MD            Relevant Problems   Anesthesia (within normal limits)      Cardiac   (+) Atherosclerosis of coronary artery without angina pectoris   (+) Cardiac arrhythmia   (+) Essential hypertension, benign   (+) Hyperlipemia, mixed   (+) Hyperlipidemia, mixed   (+) Rib pain      Pulmonary (within normal limits)      Neuro   (+) Anxiety   (+) Brachial neuritis or radiculitis   (+) Charcot's arthropathy associated with type 2 diabetes mellitus (Multi)   (+) Polyneuropathy   (+) Recurrent depressive disorder (CMS-HCC)   (+) Situational depression      GI (within normal limits)      /Renal (within normal limits)      Liver (within normal limits)      Endocrine   (+) Charcot's arthropathy associated with type 2 diabetes mellitus (Multi)   (+) Diabetes mellitus type 2 in nonobese (Multi)   (+) 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)   (+) Type 2 diabetes mellitus with mild nonproliferative diabetic retinopathy without macular edema, bilateral (Multi)      HEENT   (+) Mixed conductive and sensorineural hearing loss of both ears       Clinical information reviewed:   Tobacco  Allergies    Med Hx  Surg Hx   Fam Hx  Soc Hx        NPO Detail:  NPO/Void Status  Date of Last Liquid: 08/13/24  Time of Last Liquid: 2200  Date of Last Solid: 08/13/24  Time of Last Solid: 1600  Time of Last Void: 0600         Physical Exam    Airway  Mallampati: III     Cardiovascular - normal exam  (+) murmur     Dental - normal exam     Pulmonary - normal exam  Breath sounds clear to auscultation     Abdominal            Anesthesia Plan    History of general anesthesia?:  yes  History of complications of general anesthesia?: no    ASA 3     general     intravenous induction   Anesthetic plan and risks discussed with patient.  Use of blood products discussed with patient who consented to blood products.    Plan discussed with CAA and attending.

## 2024-08-14 NOTE — ANESTHESIA PROCEDURE NOTES
Airway  Date/Time: 8/14/2024 8:36 AM  Urgency: elective    Airway not difficult    Staffing  Performed: MIKEY   Authorized by: Osbaldo Alberts MD    Performed by: MIKEY Arizmendi  Patient location during procedure: OR    Indications and Patient Condition  Indications for airway management: anesthesia  Spontaneous Ventilation: absent  Sedation level: deep  Preoxygenated: yes  Patient position: sniffing  Mask difficulty assessment: 1 - vent by mask    Final Airway Details  Final airway type: endotracheal airway      Successful airway: ETT  Cuffed: yes   Successful intubation technique: video laryngoscopy  Facilitating devices/methods: intubating stylet  Endotracheal tube insertion site: oral  Blade: Shankar  Blade size: #3  ETT size (mm): 7.0  Cormack-Lehane Classification: grade I - full view of glottis  Placement verified by: chest auscultation and capnometry   Measured from: lips  ETT to lips (cm): 21  Number of attempts at approach: 1

## 2024-08-14 NOTE — ANESTHESIA PROCEDURE NOTES
Peripheral IV  Date/Time: 8/14/2024 8:40 AM  Inserted by: Eleanor Rosenthal    Placement  Needle size: 18 G  Laterality: left  Location: hand  Site prep: alcohol  Technique: anatomical landmarks  Attempts: 1

## 2024-08-14 NOTE — ANESTHESIA PROCEDURE NOTES
Peripheral Block    Patient location during procedure: pre-op  Start time: 8/14/2024 7:15 AM  End time: 8/14/2024 7:30 AM  Reason for block: at surgeon's request and post-op pain management  Staffing  Performed: resident   Authorized by: Bunny Gomez DO    Performed by: Zoraida Proctor MD  Preanesthetic Checklist  Completed: patient identified, IV checked, site marked, risks and benefits discussed, surgical consent, monitors and equipment checked, pre-op evaluation and timeout performed   Timeout performed at: 8/14/2024 7:17 AM  Peripheral Block  Patient position: laying flat  Prep: ChloraPrep  Patient monitoring: heart rate and continuous pulse ox  Block type: QL  Laterality: B/L  Injection technique: single-shot  Guidance: ultrasound guided  Local infiltration: ropivacaine  Needle  Needle type: Tuohy   Needle gauge: 26 G  Needle length: 8 cm  Needle localization: ultrasound guidance     image stored in chart  Assessment  Injection assessment: negative aspiration for heme, no paresthesia on injection, incremental injection and local visualized surrounding nerve on ultrasound  Heart rate change: no  Slow fractionated injection: no  Additional Notes  QL single shot. informed consent obtained. risks and benefits discussed. ASA monitors placed, timeout performed. Pt positioned, prepped with chlorhexidine, draped with sterile towels. Ultrasound guidance used with visualization of the needle throughout duration of the procedure. Aspiration was negative. A total of 30 cc 0.5% ropivacaine, 50mcg epinephrine, and 4mg decadron injected between both sides. Patient tolerated procedure well.     Timeout by Joey MITCHELL

## 2024-08-14 NOTE — SIGNIFICANT EVENT
Simran Burroughs is a 69 y.o. female on day 0 of admission presenting with Uterine mass.      Subjective   Reports she is tired. Has not yet peed, tried but was unable to go. Has gotten out of bed to walk to the bathroom. Not yet passing gas. Has not yet tried eating, doesn't have much of an appetite. Food at bedside, notes she is going to try to eat soon. Denies nausea, vomiting, SOB, or chest pain.        Objective     OB/GYN History     FTSVD x2  - Menopausal age 56; +PMB x1 year   - Last Pap: uncertain          Last Recorded Vitals  Blood pressure 153/68, pulse 63, temperature 36 °C (96.8 °F), resp. rate 13, SpO2 97%.      Physical Exam  General: no acute distress  HEENT: normocephalic, atraumatic  Heart: normal rate, regular rhythm, grade 3/6 systolic murmur   Lungs: CTAB  Abdomen: soft, nondistended, non-tender to palpation, no rebound/guarding. Incisions covered in dermabond, intact.   Extremities: moving all extremities, no peripheral edema  Neuro: awake and conversant  Psych: appropriate mood and affect      Assessment/Plan     Assessment & Plan  Uterine mass    Postmenopausal bleeding      69 y.o. now POD#0 s/p TLH/BSO and sent[place] lymph node mapping/biopsies and staging, admitted for overnight observation due to her comorbidities       Postoperative state  Pain well controlled with PO pain meds, continue management per ERAS protocol  Tylenol scheduled, Oxycodone PRN  IS 10x/hr while awake, at bedside, continue to encourage   LR at 40mL/hr until taking adequate PO, antiemetics as needed  Regular diet, advance as tolerated  Miralax and Lashaun-Colace scheduled for constipation and simethicone PRN for gas   lazo removed in OR, difficulty urinating,  reassess at 6hr post op (1700), consider straight cath at this time   Encourage ambulation   Follow up AM labs  Heparin/SCDs for DVT ppx    Comorbidities  HTN, HLD, CAD s/p triple bypass (age 59), restart home amlodipine, carvedilol, HELD losartan and  pravastatin  Renal insufficiency   Insulin-dependent diabetes- Levemir 16u, +  SSI 1, home degludec and dapagliflozin HELD  Depression, restart home lexapro     Access: PIV  Drains: none  Fluids: LR @ 40ml/hr  Diet/Nutrition: regular  GI prophylaxis: non  DVT prophylaxis: subcutaneous hep    Dispo: Inpatient management pending meeting all post-operative milestones    Aannya Carter MD  PGY1, Obstetrics and Gynecology

## 2024-08-14 NOTE — CONSULTS
Simran Burroughs is a 69 y.o. year old female patient who presents for Laparoscopic Hysterectomy with Bilateral Salpingo-oophorectomy with 8/14. Acute Pain consulted for block for postoperative pain control.     Anticipated Postop Pain Issues -   Palliative: typically relieved with IV analgesics and regional local anesthetics  Provocative: typically with movement  Quality: typically burning and aching  Radiation: typically none  Severity: typically severe 8-10/10  Timing: typically constant    Past Medical History:   Diagnosis Date    Arthritis     Coronary artery disease     Fracture, humerus     Hyperlipidemia     Hypertension     Left shoulder pain     Myocardial infarction (Multi)     Other specified diabetes mellitus without complications (Multi)     Diabetes mellitus of other type without complication    Personal history of other diseases of the circulatory system     History of hypertension    Type 2 diabetes mellitus (Multi)         Past Surgical History:   Procedure Laterality Date    CORONARY ARTERY BYPASS GRAFT  2014    OTHER SURGICAL HISTORY  12/13/2021    Heart surgery        Family History   Problem Relation Name Age of Onset    Diabetes Mother      Lymphoma Brother      Cancer Brother          Social History     Socioeconomic History    Marital status:      Spouse name: Not on file    Number of children: Not on file    Years of education: Not on file    Highest education level: Not on file   Occupational History    Not on file   Tobacco Use    Smoking status: Never     Passive exposure: Current    Smokeless tobacco: Never   Vaping Use    Vaping status: Never Used   Substance and Sexual Activity    Alcohol use: Not Currently     Alcohol/week: 2.0 standard drinks of alcohol     Types: 2 Shots of liquor per week     Comment: occasionally    Drug use: Never    Sexual activity: Defer   Other Topics Concern    Not on file   Social History Narrative    Not on file     Social Determinants of Health      Financial Resource Strain: Not on file   Food Insecurity: Not on file   Transportation Needs: Not on file   Physical Activity: Not on file   Stress: Not on file   Social Connections: Unknown (10/26/2023)    Received from [facility]    Social Connections     Frequency of Communication with Friends and Family: Not asked     Frequency of Social Gatherings with Friends and Family: Not asked   Intimate Partner Violence: Unknown (10/26/2023)    Received from [facility]    Intimate Partner Violence     Fear of Current or Ex-Partner: Not asked     Emotionally Abused: Not asked     Physically Abused: Not asked     Sexually Abused: Not asked   Housing Stability: Not on file        Allergies   Allergen Reactions    Naproxen Sodium Other and Unknown     HYPOTENSION    Lisinopril Cough         Review of Systems  Gen: No fatigue, anorexia, insomnia, fever.   Eyes: No vision loss, double vision, drainage, eye pain.   ENT: No pharyngitis, dry mouth, no hearing changes or ear discharge  Cardiac: No chest pain, palpitations, syncope, near syncope.   Pulmonary: No shortness of breath, cough, hemoptysis.   Heme/lymph: No swollen glands, fever, bleeding.   GI: No abdominal pain, change in bowel habits, melena, hematemesis, hematochezia, nausea, vomiting, diarrhea.   : No discharge, dysuria, frequency, urgency, hematuria.  Endo: No polyuria or weight loss.   Musculoskeletal: Negative for any pain or loss of ROM/weakness  Skin: No rashes or lesions  Neuro: Normal speech, no numbness or weakness. No gait difficulties  Review of systems is otherwise negative unless stated above or in history of present illness.    Physical Exam:  Constitutional:  no distress, alert and cooperative  Eyes: clear sclera  Head/Neck: No apparent injury, trachea midline  Respiratory/Thorax: Patent airways, thorax symmetric, breathing comfortably  Cardiovascular: no pitting edema  Gastrointestinal:  Nondistended  Musculoskeletal: ROM intact  Extremities: no clubbing  Neurological: alert, mckinely x4  Psychological: Appropriate affect    Results for orders placed or performed during the hospital encounter of 08/14/24 (from the past 24 hour(s))   POCT GLUCOSE   Result Value Ref Range    POCT Glucose 174 (H) 74 - 99 mg/dL        Plan:    - Bilateral QS SS blocks performed preoperatively on 8/14  - Pain medications per primary team  - Will see on POD1 if inpatient    Acute Pain Team  pg [phone] ph [phone].

## 2024-08-14 NOTE — PROGRESS NOTES
"Pharmacy Medication History Review    Simran Burroughs is a 69 y.o. female admitted for Uterine mass. Pharmacy reviewed the patient's [id number] medications and allergies for accuracy.    Medications ADDED:  None  Medications CHANGED:  None  Medications REMOVED:   None    The list below reflects the updated PTA list.   Prior to Admission Medications   Prescriptions Last Dose Informant Patient Reported?   amLODIPine (Norvasc) 10 mg tablet Past Week Self No   Sig: Take 1 tablet (10 mg) by mouth once daily.   blood-glucose sensor (FreeStyle Sulma 3 Sensor) device  Self No   Sig: USE AS DIRECTED CHANGE SENSOR EVERY 14 DAYS   carvedilol (Coreg) 12.5 mg tablet Past Week Self No   Sig: Take 1 tablet (12.5 mg) by mouth 2 times a day.  Patient taking differently: 1 tab by mouth once daily.   cholecalciferol (Vitamin D-3) 125 MCG (5000 UT) capsule Past Week Self Yes   Sig: Take 1 capsule (125 mcg) by mouth once daily.   cyanocobalamin (Vitamin B-12) 1,000 mcg tablet Past Week Self Yes   Sig: Take 1 tablet (1,000 mcg) by mouth once daily.   dapagliflozin propanediol (Farxiga) 10 mg Past Week Self No   Sig: TAKE 1 TABLET BY MOUTH ONE TIME DAILY   escitalopram (Lexapro) 10 mg tablet 8/13/2024 Self No   Sig: Take 1 tablet (10 mg) by mouth once daily.   insulin degludec (Tresiba FlexTouch U-100) 100 unit/mL (3 mL) injection Past Week Self No   Sig: INJECT 64 UNITS SUBCUTANEOUSLY ONE TIME DAILY AT BEDTIME   Patient taking differently: 32 units daily   losartan (Cozaar) 100 mg tablet Past Week Self No   Sig: Take 1 tablet (100 mg) by mouth once daily.  Patient not taking reported 8/14/24.   pen needle, diabetic 32 gauge x 5/32\" needle  Self Yes   Sig: Inject 1 Pen needle under the skin 4 times a day.  use 4 pen needles per day to inject insulin   pravastatin (Pravachol) 20 mg tablet 8/13/2024 Self No   Sig: Take 1 tablet (20 mg) by mouth once daily at bedtime.   semaglutide (Ozempic) 2 mg/dose (8 mg/3 mL) pen injector Past " "Week Self No   Sig: INJECT 2 MG UNDER THE SKIN ONCE WEEKLY AS DIRECTED      Facility-Administered Medications: None        The list below reflects the updated allergy list. Please review each documented allergy for additional clarification and justification.  Allergies  Reviewed by Mckayla Jang on 8/14/2024        Severity Reactions Comments    Naproxen Sodium Medium Other, Unknown HYPOTENSION    Lisinopril Not Specified Cough             Patient declines M2B at discharge.     Sources:   Patient interview  Dispense history  O[facility]RS - no significant history    Additional Comments:  Patient is a poor historian. Patient could not recall any medications without being prompted.  Patient reports that she is not taking losartan.   Patient reports taking carvedilol 1 tablet by mouth once daily.        MCKAYLA JANG  PGY-1 Pharmacy Resident, [facility]   08/14/24     Secure Chat preferred   If no response call [phone] or [employer] \"Med Rec\"   "

## 2024-08-14 NOTE — DISCHARGE INSTRUCTIONS
Laparoscopic Hysterectomy Discharge Instructions  If you have any questions about your care, please contact the Gynecologic Oncology office at [phone].    Medications and Pain Management  Common areas of pain after laparoscopic hysterectomy include the incision pain, pain in between your shoulder blades, the pelvis and lower back. The gas that was used to distend your abdomen for the surgery is absorbed slowly into your blood stream over the first 3-4 days after surgery. It is not passed intestinally, although, because your abdomen is distended, it may feel similar to intestinal gas. Staying active and walking is the best way to promote the absorption of this gas.  Immediately after surgery, nerve pain is the most intense, typically for the first 6 to 12 hours. As the body heals, it creates inflammation around the incisions sites adding pressure and creating soreness. After 5 days, the inflammation begins to recede and significant improvement in soreness is expected. Pulling on the incisions, especially if sudden, such as when you cough, will reactivate the nerve pain. Support your abdomen with a pillow during coughing or sneezing as this will be helpful to minimize pain.  Tylenol can be taken every 6 hours for the first week.  The opiod medication can be used on a schedule for the first 1 to 2 days but after that, only as you need it. Narcotics can cause constipation, nausea, sleepiness and headaches. You may begin your usual home medications as you were taking before unless directed by your doctor.    Incisional care  Paper tape steri-strips are typically used for the abdominal incisions. The steri-strips will fall off on their own or can be removed at your first post-operative appointment. You may shower and use a mild soap around the incisions and pat dry. Do not use a washcloth or scrub the incisions. Using peroxide or antiseptics is not recommended for routine care. Avoid hot and steamy showers as  this may cause you to feel faint. No tub baths for six weeks following surgery. There may be discoloration or bruising around the incisions. This is normal and may take several weeks to resolve. Firmness or a nodular area under the skin near the incision may represent a collection of blood, this too will resolve on its own after a little time. If any incision develops tenderness, redness in the skin layer or has drainage please call the office.    Vaginal Discharge  You may have a mildly malodorous discharge and occasional spotting for up to 6 weeks. Do not put anything in the vagina like tampons or have sexual intercourse for 6 weeks after surgery. If you are having bleeding like a period, that is abnormal and you should contact your doctor.    GI Function, Nausea and Constipation  Nausea can occasionally be an issue in the first few days after surgery. It is usually caused as a side effect from the anesthesia and pain medicine, particularly narcotics. Taking the pain pills with food is a food way to proactively minimize this. Throwing up, especially after the first day, is not expected and if this happens, you should call your doctor. Feeling gassy and constipation can be a problem for the first week after surgery. Limiting the use of narcotics may be helpful. Stool softeners twice a day and a high fiber diet are safe. If needed, Miralax once daily is a good choice. If no bowel movement after 3 days, you will need to increase the Miralax until soft, regular bowel movements are passing.    Urinating  Because the bladder is disturbed by the surgery, the normal sensation may be temporarily altered. You may not be aware that your bladder is full. If the bladder is allowed to get over distended, it may make the problem worse. This is why we make sure that you are able to empty your bladder adequately before you go home. For the first few days at home, you should make a point to empty your bladder every 3 to 4 hours.  Pain with voiding, especially after the first day, is not expected and may represent a bladder infection.    Activity  For the first two days post-operatively, your soreness and recovery from anesthesia will limit your activity  Stairs are safe, just take your time  At a minimum during this time, you should walk around for 10-15 minutes every 2-3 hours. After that, in the first week, any activity except for overt exercise is safe.   During the first week you should not commit to being on your feet for more than 30 minutes at a time.   During the second week, light exercise is encouraged.  After 2 weeks from surgery, you should try to get back into regular activity other than heavy lifting.   For healing, please limit the amount of weight lifted to 8-10 pounds (a gallon of milk) for the first 6 weeks after surgery.   Driving is usually okay after the first few days. You must be able to comfortably wear a seatbelt, press the gas/brake pedals, and drive defensively. You may not drive while taking narcotic pain medicines.    When to Call the Doctor  Call for any fever above 100.4 F (If you do not feel feverish you do not have to routinely check your temperature.)  Call for severe pain not improved by medications  Call for persistent nausea, vomiting  Call for vaginal bleeding that is heavy as a period or passing blood clots larger than a quarter  Call for unusual swelling in your legs  Call if the incisions develop painful redness and discharge    In an emergency, call 911 or go to an Emergency Department at a nearby hospital

## 2024-08-14 NOTE — ANESTHESIA POSTPROCEDURE EVALUATION
Patient: Simran Burroughs    Procedure Summary       Date: 08/14/24 Room / Location: [facility] OR 03 / [facility] OR    Anesthesia Start: 0824 Anesthesia Stop: 1111    Procedures:       Hysterectomy Laparoscopy with Bilateral Salpingo-Oophorectomy (Bilateral)      [place] lymph node mapping and biopsies, surgical staging, all indicated procedures (Bilateral) Diagnosis:       Uterine mass      Postmenopausal bleeding      (Uterine mass [N85.8])      (Postmenopausal bleeding [N95.0])    Surgeons: Yane Mustafa MD Responsible Provider: Osbaldo Alberts MD    Anesthesia Type: general ASA Status: 3            Anesthesia Type: general    Vitals Value Taken Time   /74 08/14/24 1105   Temp 37.1 °C (98.8 °F) 08/14/24 1058   Pulse 60 08/14/24 1105   Resp 11 08/14/24 1105   SpO2 100 08/14/24 1112       Anesthesia Post Evaluation    Patient location during evaluation: PACU  Patient participation: complete - patient participated  Level of consciousness: sleepy but conscious  Pain score: 0  Pain management: adequate  Airway patency: patent  Cardiovascular status: acceptable  Respiratory status: acceptable  Hydration status: acceptable  Postoperative Nausea and Vomiting: none        There were no known notable events for this encounter.

## 2024-08-15 VITALS
HEIGHT: 62 IN | OXYGEN SATURATION: 94 % | RESPIRATION RATE: 15 BRPM | BODY MASS INDEX: 27.71 KG/M2 | HEART RATE: 72 BPM | WEIGHT: 150.6 LBS | DIASTOLIC BLOOD PRESSURE: 73 MMHG | TEMPERATURE: 97.5 F | SYSTOLIC BLOOD PRESSURE: 170 MMHG

## 2024-08-15 PROBLEM — C55 UTERINE CANCER (MULTI): Status: ACTIVE | Noted: 2024-08-15

## 2024-08-15 LAB
GLUCOSE BLD MANUAL STRIP-MCNC: 225 MG/DL (ref 74–99)
GLUCOSE BLD MANUAL STRIP-MCNC: 232 MG/DL (ref 74–99)

## 2024-08-15 PROCEDURE — 2500000002 HC RX 250 W HCPCS SELF ADMINISTERED DRUGS (ALT 637 FOR MEDICARE OP, ALT 636 FOR OP/ED)

## 2024-08-15 PROCEDURE — 99231 SBSQ HOSP IP/OBS SF/LOW 25: CPT | Performed by: STUDENT IN AN ORGANIZED HEALTH CARE EDUCATION/TRAINING PROGRAM

## 2024-08-15 PROCEDURE — 2500000004 HC RX 250 GENERAL PHARMACY W/ HCPCS (ALT 636 FOR OP/ED): Performed by: STUDENT IN AN ORGANIZED HEALTH CARE EDUCATION/TRAINING PROGRAM

## 2024-08-15 PROCEDURE — 2500000001 HC RX 250 WO HCPCS SELF ADMINISTERED DRUGS (ALT 637 FOR MEDICARE OP): Performed by: NURSE PRACTITIONER

## 2024-08-15 PROCEDURE — 7100000011 HC EXTENDED STAY RECOVERY HOURLY - NURSING UNIT

## 2024-08-15 PROCEDURE — G0378 HOSPITAL OBSERVATION PER HR: HCPCS

## 2024-08-15 PROCEDURE — 97161 PT EVAL LOW COMPLEX 20 MIN: CPT | Mod: GP | Performed by: STUDENT IN AN ORGANIZED HEALTH CARE EDUCATION/TRAINING PROGRAM

## 2024-08-15 PROCEDURE — 2500000001 HC RX 250 WO HCPCS SELF ADMINISTERED DRUGS (ALT 637 FOR MEDICARE OP): Performed by: STUDENT IN AN ORGANIZED HEALTH CARE EDUCATION/TRAINING PROGRAM

## 2024-08-15 PROCEDURE — 82947 ASSAY GLUCOSE BLOOD QUANT: CPT

## 2024-08-15 ASSESSMENT — COGNITIVE AND FUNCTIONAL STATUS - GENERAL
MOBILITY SCORE: 23
MOBILITY SCORE: 24
DAILY ACTIVITIY SCORE: 22
TOILETING: A LITTLE
CLIMB 3 TO 5 STEPS WITH RAILING: A LITTLE
DRESSING REGULAR LOWER BODY CLOTHING: A LITTLE

## 2024-08-15 ASSESSMENT — PAIN DESCRIPTION - LOCATION: LOCATION: ABDOMEN

## 2024-08-15 ASSESSMENT — PAIN SCALES - GENERAL
PAINLEVEL_OUTOF10: 4
PAINLEVEL_OUTOF10: 2

## 2024-08-15 NOTE — PROGRESS NOTES
Postop Pain HPI -   Palliative: relieved with IV analgesics and regional local anesthetics  Provocative: movement  Quality:  burning and aching  Radiation:  none  Severity:  3/10  Timing: constant    24-HOUR OPIOID CONSUMPTION:  none    Scheduled medications  acetaminophen, 975 mg, oral, q6h  amLODIPine, 10 mg, oral, Daily  carvedilol, 12.5 mg, oral, BID  escitalopram, 10 mg, oral, Daily  insulin detemir, 16 Units, subcutaneous, q24h  insulin lispro, 0-15 Units, subcutaneous, Before meals & nightly  polyethylene glycol, 17 g, oral, Daily  sennosides-docusate sodium, 1 tablet, oral, BID      Continuous medications     PRN medications  PRN medications: dextrose, dextrose, glucagon, glucagon, naloxone, ondansetron, oxyCODONE, simethicone     Physical Exam:  Constitutional:  no distress, alert and cooperative  Eyes: clear sclera  Head/Neck: No apparent injury, trachea midline  Respiratory/Thorax: Patent airways, thorax symmetric, breathing comfortably  Cardiovascular: no pitting edema  Gastrointestinal: Nondistended  Musculoskeletal: ROM intact  Extremities: no clubbing  Neurological: alert, mckinley x4  Psychological: Appropriate affect    Results for orders placed or performed during the hospital encounter of 08/14/24 (from the past 24 hour(s))   POCT GLUCOSE   Result Value Ref Range    POCT Glucose 275 (H) 74 - 99 mg/dL   POCT GLUCOSE   Result Value Ref Range    POCT Glucose 243 (H) 74 - 99 mg/dL   POCT GLUCOSE   Result Value Ref Range    POCT Glucose 207 (H) 74 - 99 mg/dL   Magnesium   Result Value Ref Range    Magnesium 2.15 1.60 - 2.40 mg/dL   POCT GLUCOSE   Result Value Ref Range    POCT Glucose 177 (H) 74 - 99 mg/dL   POCT GLUCOSE   Result Value Ref Range    POCT Glucose 384 (H) 74 - 99 mg/dL   POCT GLUCOSE   Result Value Ref Range    POCT Glucose 225 (H) 74 - 99 mg/dL   POCT GLUCOSE   Result Value Ref Range    POCT Glucose 232 (H) 74 - 99 mg/dL       Plan:     - Bilateral QS SS blocks performed preoperatively on  8/14  - Pain medications per primary team  -Acute Pain Service will sign off at this time. Please let us know if any further questions or concerns.       Acute Pain Team  pg [phone] ph [phone].

## 2024-08-15 NOTE — NURSING NOTE
Simran SCALES Manjeet discharged at 10:19 AM  and 08/15/24   Patient discharged home with daughter and  providing transport   Both IVs removed with no complications   Discharge education and teaching completed with Simran Burroughs, daughter and Matthew, spouse.   RN signature: Juana Avalos RN

## 2024-08-15 NOTE — DISCHARGE SUMMARY
Discharge Diagnosis  Uterine mass    Issues Requiring Follow-Up  none    Test Results Pending At Discharge  Pending Labs       Order Current Status    Surgical Pathology Exam In process            Hospital Course  Simran Burroughs was admitted and had Hysterectomy Laparoscopy with Salpingo-Oophorectomy, [place] lymph node mapping and biopsies, surgical staging on 8/14/2024.  She met all her postop milestones on 8/15 and was discharged in stable condition with the plan to follow up with Dr. Mustafa on 9/03.   She will continue her home Diabetes regimen and contact with her Endocrine team for diabetes management.    From admission HPI      Simran Burroughs is a 69 y.o. female with uterine mass suspicious for malignancy and postmenopausal bleeding presenting for Hysterectomy Laparoscopy with Salpingo-Oophorectomy, [place] lymph node mapping and biopsies, surgical staging, all indicated procedures;     Preop labs (8/5/24): Hgb 14.4, Plt 276, Cr 1.37 (patient's baseline)    PAT (8/5/24): cleared, see note for risk scores    Cardiology evaluation 7/29/24: cleared for surgery     Tumor History:  6/4/24: pelvic ultrasound showing endometrial mass concerning for neoplasm  6/25/24: MRI pelvis showing olypoidal endometrial soft tissue mass measuring 3.8  x 2.9 cm with a evidence of surrounding foci of myometrial wall invasion (mainly along the right anterior aspect) along with upstream endometrial canal distention  7/11/24: EMB showing low grade enodmetroid type endometrial carcinoma     Pelvic US 6/4/24  IMPRESSION:  1. Large 6.1 cm complex cystic and frond-like solid mass involving  the majority of the uterus without discrete visualization of the  endometrium. Differential includes a neoplasm of endometrial origin,  versus less likely myometrial origin with obscuration of the  endometrium. Recommend correlation with endometrial biopsy and MRI  pelvis with and without contrast.      2. Normal right ovary. Left ovary not  visualized.    MR Pelvis 24  IMPRESSION:  Postcontrast images are limited given motion artifact and body  habitus.  1. Infiltrative polypoidal endometrial soft tissue mass measuring 3.8  x 2.9 cm with a evidence of surrounding foci of myometrial wall  invasion (mainly along the right anterior aspect) along with upstream  endometrial canal distention. There is apparent asymmetric right  lower extra uterine soft tissue fullness beyond the uterine confine  highly concerning for extension. The mass is abutting the adjacent  small bowel loops but no definite invasion as well as no definite  urinary bladder involvement. Findings are highly concerning for  endometrial carcinoma and advise tissue sampling  2. No grossly enlarged pelvic lymph nodes    EMB 2024  A. ENDOMETRIUM BIOPSY:   -- SCANT SMALL FRAGMENTS WITH FEATURES CONSISTENT WITH LOW-GRADE ENDOMETRIOID TYPE ENDOMETRIAL CARCINOMA EMBEDDED IN MUCUS, SEE COMMENT    PMH:   - HTN, HLD  - CAD s/p triple bypass (age 59) - Dr Gross in [place]  - Renal insufficiency   - Insulin-dependent diabetes   - Depression     PSH:  -bilateral cateract surgery     Ob/Gyn History:     FTSVD x2  - Menopausal age 56; +PMB x1 year   - Last Pap: uncertain      Social Hx:  she has never smoked. She has never used smokeless tobacco.  She  reports that she does not currently use alcohol after a past usage of about 2.0 standard drinks of alcohol per week. 1-2x per month   She  reports no history of drug use.  Works in [employer] [place] ([employer]); lives with spouse and dogs     Fam Hx:  - Last colonoscopy 2020  - Last mammogram 2021 - Cat 2        Medications:  Lexapro 10mg   Pravastatin 20mg  Amlodipine 10mg    Allergies:  Naproxen sodium and Lisinopril      Pertinent Physical Exam At Time of Discharge  Physical Exam  General: no acute distress  HEENT: normocephalic, atraumatic  Heart: warm and well perfused, RRR  Lungs: no increased WOB, CTAB  Abd:  "soft, nontender, laparoscopic incisions dry, clean, covered by Dermabond  Extremities: moving all extremities  Neuro: awake and conversant  Psych: appropriate mood and affect     Home Medications     Medication List      START taking these medications     acetaminophen 325 mg tablet; Commonly known as: Tylenol; Take 3 tablets   (975 mg) by mouth every 6 hours for 7 days.   oxyCODONE 5 mg immediate release tablet; Commonly known as: Roxicodone;   Take 1 tablet (5 mg) by mouth every 6 hours if needed for moderate pain (4   - 6) or severe pain (7 - 10) for up to 3 days.   polyethylene glycol 17 gram packet; Commonly known as: Glycolax,   Miralax; Take 17 g by mouth once daily as needed (constipation).   sennosides-docusate sodium 8.6-50 mg tablet; Commonly known as:   Lashaun-Colace; Take 1 tablet by mouth 2 times a day. While taking opiod   medications then as needed for stool softener. Hold for diarrhea.   simethicone 80 mg chewable tablet; Commonly known as: Mylicon; Chew 1   tablet (80 mg) 4 times a day as needed (gas pain).     CONTINUE taking these medications     amLODIPine 10 mg tablet; Commonly known as: Norvasc; Take 1 tablet (10   mg) by mouth once daily.   carvedilol 12.5 mg tablet; Commonly known as: Coreg; Take 1 tablet (12.5   mg) by mouth 2 times a day.   cholecalciferol 125 MCG (5000 UT) capsule; Commonly known as: Vitamin   D-3   cyanocobalamin 1,000 mcg tablet; Commonly known as: Vitamin B-12   escitalopram 10 mg tablet; Commonly known as: Lexapro; Take 1 tablet (10   mg) by mouth once daily.   Farxiga 10 mg; Generic drug: dapagliflozin propanediol; TAKE 1 TABLET BY   MOUTH ONE TIME DAILY   FreeStyle Sulma 3 Sensor device; Generic drug: blood-glucose sensor; USE   AS DIRECTED CHANGE SENSOR EVERY 14 DAYS   Ozempic 2 mg/dose (8 mg/3 mL) pen injector; Generic drug: semaglutide;   INJECT 2 MG UNDER THE SKIN ONCE WEEKLY AS DIRECTED   pen needle, diabetic 32 gauge x 5/32\" needle   pravastatin 20 mg tablet; " Commonly known as: Pravachol; Take 1 tablet   (20 mg) by mouth once daily at bedtime.   Tresiba FlexTouch U-100 100 unit/mL (3 mL) injection; Generic drug:   insulin degludec; INJECT 64 UNITS SUBCUTANEOUSLY ONE TIME DAILY AT BEDTIME     ASK your doctor about these medications     losartan 100 mg tablet; Commonly known as: Cozaar; Take 1 tablet (100   mg) by mouth once daily.       Outpatient Follow-Up  Future Appointments   Date Time Provider Department Center   9/3/2024  4:20 PM Yane Mustafa MD [id number] [facility]   10/29/2024  9:30 AM Cuba Dhillon, APRN-CNP [id number] [facility]   11/14/2024  9:30 AM Sarina Don PA-C [id number] [facility]   12/2/2024  9:00 AM Albino Gross MD [id number] [facility]       Seen and d/w Dr. Vamsi Ribera MD PGY2

## 2024-08-15 NOTE — PROGRESS NOTES
Physical Therapy    Physical Therapy Evaluation    Patient Name: Simran Burroughs  MRN: [id number]  Room: [address] [place] [address]  Today's Date: 8/15/2024   Time Calculation  Start Time: 0945  Stop Time: 0955  Time Calculation (min): 10 min    Assessment/Plan   PT Assessment  Barriers to Discharge: none  End of Session Communication: Bedside nurse  End of Session Patient Position: Bed, 3 rail up  IP OR SWING BED PT PLAN  Inpatient or Swing Bed: Inpatient  PT Plan  PT Plan: PT Eval only  PT Eval Only Reason: No acute PT needs identified  PT Frequency: PT eval only  PT Discharge Recommendations: No PT needed after discharge  PT Recommended Transfer Status: Independent  Reason for Referral: Hysterectomy Laparoscopy with Salpingo-Oophorectomy, [place] lymph node mapping and biopsies, surgical staging on 8/14/2024  Past Medical History Relevant to Rehab: - HTN, HLD  - CAD s/p triple bypass (age 59) - Dr Gross in [place]  - Renal insufficiency   - Insulin-dependent diabetes   - Depression  Prior to Session Communication: Bedside nurse  Patient Position Received: Bed, 3 rail up    Subjective     General Visit Information:  Subjective: Patient is alert, agreeable to PT.  In good spirits and motivated to DC Home later today.    Reason for Referral: Hysterectomy Laparoscopy with Salpingo-Oophorectomy, [place] lymph node mapping and biopsies, surgical staging on 8/14/2024  Past Medical History Relevant to Rehab: - HTN, HLD  - CAD s/p triple bypass (age 59) - Dr Gross in [place]  - Renal insufficiency   - Insulin-dependent diabetes   - Depression  Prior to Session Communication: Bedside nurse  Patient Position Received: Bed, 3 rail up  Family/Caregiver Present: Yes   Home Living:  Home Living  Type of Home: House  Lives With: Spouse  Home Adaptive Equipment: None  Home Access: No concerns  Prior Level of Function:  Prior Function Per Pt/Caregiver Report  Level of [place]: Independent with ADLs and functional  transfers  Precautions:  Precautions  Medical Precautions: Fall precautions  Vital Signs:     Medical Gas Therapy: None (Room air)    Lines/Tubes/Drains:     Medical Gas Therapy: None (Room air)  Continuous Medications/Drips:       Objective   Pain:  Pain Assessment  0-10 (Numeric) Pain Score: 2 (post 2)  Pain Type: Surgical pain  Pain Location: Abdomen    Cognition:  Cognition  Overall Cognitive Status: Within Functional Limits  Orientation Level: Oriented X4    General Assessments:  Extremity/Trunk Assessments:  Tone: No abnormalities noted  Sensation  Light Touch: No apparent deficits  Coordination  Movements are Fluid and Coordinated: Yes  Upper Extremity  ROM: WFL  Strength:WFL  Lower Extremity  ROM: WFL  Strength: WFL   Sitting Static Balance Normal  Sitting Dynamic Balance Normal  Standing Static Balance Normal  Standing Dynamic Balance Normal    Functional Assessments:  Bed Mobility  Bed Mobility: Yes  Bed Mobility 1  Bed Mobility 1: Supine to sitting  Level of Assistance 1: Independent    Transfers  Transfer: Yes  Transfer 1  Transfer From 1: Sit to  Transfer to 1: Stand  Transfer Level of Assistance 1: Independent  Transfers 2  Transfer From 2: Stand to  Transfer to 2: Sit  Transfer Level of Assistance 2: Independent  Transfers 3  Transfer From 3: Bed to  Transfer to 3: Bed  Transfer Level of Assistance 3: Independent    Ambulation/Gait Training  Ambulation/Gait Training Performed: Yes  Ambulation/Gait Training 1  Surface 1: Level tile  Device 1: No device  Assistance 1: Independent  Quality of Gait 1:  (WNL)  Comments/Distance (ft) 1: 20 x 2              Outcome Measures:  [facility] Basic Mobility  Turning from your back to your side while in a flat bed without using bedrails: None  Moving from lying on your back to sitting on the side of a flat bed without using bedrails: None  Moving to and from bed to chair (including a wheelchair): None  Standing up from a chair using your arms (e.g. wheelchair or bedside  chair): None  To walk in hospital room: None  Climbing 3-5 steps with railing: None  Basic Mobility - Total Score: 24                 [place]  Sitting Balance: Steady, safe  Arises: Able without using arms  Attempts to Arise: Able to arise, one attempt  Immediate Standing Balance (First 5 Seconds): Steady without walker or other support  Standing Balance: Narrow stance without support  Nudged: Steady without walker or other support  Eyes Closed: Steady  Turned 360 Degrees: Steadiness: Steady  Turned 360 Degrees: Continuity of Steps: Continuous  Sitting Down: Safe, smooth motion  Balance Score: 16  Initiation of Gait: No hesitancy  Step Height: R Swing Foot: Right foot complete clears floor  Step Length: R Swing Foot: Passes left stance foot  Step Height: L Swing Foot: Left foot complete clears floor  Step Length: L Swing Foot: Passes right stance foot  Step Symmetry: Right and left step appear equal  Step Continuity: Steps appear continuous  Path: Straight without walking aid  Trunk: No sway, no flexion, no use of arms, no walking aid  Walking Time: Heels almost touching while walking  Gait Score: 12  Total Score: 28  Timed Up and Go Test  How many seconds did it take to complete the 5 tasks?: 8 seconds  Other Measures  5x Sit to Stand: 11    Encounter Problems       Encounter Problems (Resolved)       Pain - Adult            Assessment: Patient currently independent for all mobility.  No further acute PT warranted at this time.  Please continue mobility during acute stay with nursing staff.  PT to sign off with no further needs.      Education Documentation  Precautions, taught by Rocael Mcgee PT at 8/15/2024 10:45 AM.  Learner: Patient  Readiness: Acceptance  Method: Explanation  Response: Verbalizes Understanding, Demonstrated Understanding    Body Mechanics, taught by Rocael Mcgee PT at 8/15/2024 10:45 AM.  Learner: Patient  Readiness: Acceptance  Method: Explanation  Response: Verbalizes  Understanding, Demonstrated Understanding    Mobility Training, taught by Rocael Mcgee PT at 8/15/2024 10:45 AM.  Learner: Patient  Readiness: Acceptance  Method: Explanation  Response: Verbalizes Understanding, Demonstrated Understanding    Education Comments  No comments found.          08/15/24 at 10:47 AM   Rocael Mcgee PT   Rehab Office: [phone]

## 2024-08-15 NOTE — CARE PLAN
Problem: Skin  Goal: Decreased wound size/increased tissue granulation at next dressing change  Outcome: Progressing  Flowsheets (Taken 8/15/2024 1021)  Decreased wound size/increased tissue granulation at next dressing change: Promote sleep for wound healing  Goal: Participates in plan/prevention/treatment measures  Outcome: Progressing  Flowsheets (Taken 8/15/2024 1021)  Participates in plan/prevention/treatment measures:   Elevate heels   Increase activity/out of bed for meals  Goal: Prevent/manage excess moisture  Outcome: Progressing  Flowsheets (Taken 8/15/2024 1021)  Prevent/manage excess moisture:   Cleanse incontinence/protect with barrier cream   Moisturize dry skin  Goal: Prevent/minimize sheer/friction injuries  Outcome: Progressing  Flowsheets (Taken 8/15/2024 1021)  Prevent/minimize sheer/friction injuries: HOB 30 degrees or less  Goal: Promote/optimize nutrition  Outcome: Progressing  Flowsheets (Taken 8/15/2024 1021)  Promote/optimize nutrition: Monitor/record intake including meals  Goal: Promote skin healing  Outcome: Progressing  Flowsheets (Taken 8/15/2024 1021)  Promote skin healing: Ensure correct size (line/device) and apply per  instructions     Problem: Pain - Adult  Goal: Verbalizes/displays adequate comfort level or baseline comfort level  Outcome: Progressing     Problem: Safety - Adult  Goal: Free from fall injury  Outcome: Progressing     Problem: Discharge Planning  Goal: Discharge to home or other facility with appropriate resources  Outcome: Progressing     Problem: Chronic Conditions and Co-morbidities  Goal: Patient's chronic conditions and co-morbidity symptoms are monitored and maintained or improved  Outcome: Progressing   The patient's goals for the shift include      The clinical goals for the shift include pt to rate pain <4/10 by end of shift        
  Problem: Skin  Goal: Decreased wound size/increased tissue granulation at next dressing change  Outcome: Progressing  Goal: Participates in plan/prevention/treatment measures  Outcome: Progressing  Goal: Prevent/manage excess moisture  Outcome: Progressing  Goal: Prevent/minimize sheer/friction injuries  Outcome: Progressing  Goal: Promote/optimize nutrition  Outcome: Progressing  Goal: Promote skin healing  Outcome: Progressing     Problem: Pain - Adult  Goal: Verbalizes/displays adequate comfort level or baseline comfort level  Outcome: Progressing     Problem: Safety - Adult  Goal: Free from fall injury  Outcome: Progressing     Problem: Discharge Planning  Goal: Discharge to home or other facility with appropriate resources  Outcome: Progressing     Problem: Chronic Conditions and Co-morbidities  Goal: Patient's chronic conditions and co-morbidity symptoms are monitored and maintained or improved  Outcome: Progressing       
The patient's goals for the shift include      The clinical goals for the shift include pain control      Problem: Skin  Goal: Decreased wound size/increased tissue granulation at next dressing change  Outcome: Progressing  Goal: Participates in plan/prevention/treatment measures  Outcome: Progressing  Goal: Prevent/manage excess moisture  Outcome: Progressing  Goal: Prevent/minimize sheer/friction injuries  Outcome: Progressing  Goal: Promote/optimize nutrition  Outcome: Progressing  Goal: Promote skin healing  Outcome: Progressing     Problem: Pain - Adult  Goal: Verbalizes/displays adequate comfort level or baseline comfort level  Outcome: Progressing     Problem: Safety - Adult  Goal: Free from fall injury  Outcome: Progressing     Problem: Discharge Planning  Goal: Discharge to home or other facility with appropriate resources  Outcome: Progressing     Problem: Chronic Conditions and Co-morbidities  Goal: Patient's chronic conditions and co-morbidity symptoms are monitored and maintained or improved  Outcome: Progressing     
DISCHARGE

## 2024-08-16 ENCOUNTER — TELEPHONE (OUTPATIENT)
Dept: PRIMARY CARE | Facility: CLINIC | Age: 69
End: 2024-08-16
Payer: COMMERCIAL

## 2024-08-16 ENCOUNTER — PATIENT OUTREACH (OUTPATIENT)
Dept: PRIMARY CARE | Facility: CLINIC | Age: 69
End: 2024-08-16
Payer: COMMERCIAL

## 2024-08-16 ENCOUNTER — DOCUMENTATION (OUTPATIENT)
Dept: PRIMARY CARE | Facility: CLINIC | Age: 69
End: 2024-08-16
Payer: COMMERCIAL

## 2024-08-16 NOTE — TELEPHONE ENCOUNTER
"GIBRAN from [facility]  // \"Patient declined to make an appt with PCP during outreach for TCM call. Patient states they prefer to contact the office on their own time to schedule follow up.     This patient was discharged from: [facility]   Discharge diagnosis:  Uterine Mass   Date of discharge: 08/15/2024\"    "

## 2024-08-16 NOTE — PROGRESS NOTES
Discharge Facility: [facility]  Discharge Diagnosis: Uterine Mass  Admission Date: 08/14/2024  Discharge Date: 08/15/2024    PCP Appointment Date: Tasked to office, patient declined  Specialist Appointment Date: Gyn/Onc 09/03/2024, Endo 10/29/2024, Cardio 12/02/2024  Hospital Encounter and Summary Linked: Yes    See discharge assessment below for further details    Medications  Medications reviewed with patient/caregiver?: Yes (8/16/2024  9:19 AM)  Is the patient having any side effects they believe may be caused by any medication additions or changes?: No (8/16/2024  9:19 AM)  Does the patient have all medications ordered at discharge?: Yes (8/16/2024  9:19 AM)  Prescription Comments: Script given at discharge for Tylenol, Oxycodone, Miralax, Per-Colace and Mylicon (8/16/2024  9:19 AM)  Is the patient taking all medications as directed (includes completed medication regime)?: Yes (8/16/2024  9:19 AM)  Medication Comments: Patient denies any issues obtaining or affording medication (8/16/2024  9:19 AM)    Appointments  Does the patient have a primary care provider?: Yes (8/16/2024  9:19 AM)  Care Management Interventions: -- (Tasked to office) (8/16/2024  9:19 AM)  Has the patient kept scheduled appointments due by today?: Yes (8/16/2024  9:19 AM)    Self Management  What is the home health agency?: N/A (8/16/2024  9:19 AM)  What Durable Medical Equipment (DME) was ordered?: N/A (8/16/2024  9:19 AM)    Patient Teaching  Does the patient have access to their discharge instructions?: Yes (8/16/2024  9:19 AM)  Care Management Interventions: Reviewed instructions with patient (8/16/2024  9:19 AM)  What is the patient's perception of their health status since discharge?: Improving (8/16/2024  9:19 AM)  Is the patient/caregiver able to teach back the hierarchy of who to call/visit for symptoms/problems? PCP, Specialist, Home Health nurse, Urgent Care, ED, 911: Yes (8/16/2024  9:19 AM)  Patient/Caregiver Education  Comments: CM spoke to patient via phone. She states that she is doing very well at home. She is walking and in very little pain. Patient declined to scheduled PCP follow up at this time stating that she will be following up with Gyn/Onc. She has all discharge medication at the home. She was thankful for this call. (8/16/2024  9:19 AM)

## 2024-08-19 ENCOUNTER — TELEPHONE (OUTPATIENT)
Dept: GYNECOLOGIC ONCOLOGY | Facility: HOSPITAL | Age: 69
End: 2024-08-19
Payer: COMMERCIAL

## 2024-08-19 DIAGNOSIS — B37.31 VAGINAL YEAST INFECTION: Primary | ICD-10-CM

## 2024-08-19 RX ORDER — FLUCONAZOLE 150 MG/1
150 TABLET ORAL ONCE
Qty: 1 TABLET | Refills: 0 | Status: SHIPPED | OUTPATIENT
Start: 2024-08-19 | End: 2024-08-19

## 2024-08-19 NOTE — TELEPHONE ENCOUNTER
Patient called to report vaginal itching that started 2 days ago.   Denies vaginal drainage, odor.  Denies urinary symptoms.   Denies pelvic pain.    Afebrile.    S/p TLH/BSO on 8/14/24.   Message routed to Dr. Mustafa and Aaliyah Mckenzie CNP    11:44 Phoned patient to notify that Mary Mckenzie CNP sent prescription for Diflucan to [facility] pharmacy.  Instructed patient to call office back if itching not improved after taking Diflucan.   Patient verbalized her understanding of information given.

## 2024-08-23 DIAGNOSIS — E11.21 TYPE 2 DIABETES MELLITUS WITH DIABETIC NEPHROPATHY, WITH LONG-TERM CURRENT USE OF INSULIN (MULTI): ICD-10-CM

## 2024-08-23 DIAGNOSIS — Z79.4 TYPE 2 DIABETES MELLITUS WITH DIABETIC NEPHROPATHY, WITH LONG-TERM CURRENT USE OF INSULIN (MULTI): ICD-10-CM

## 2024-08-23 PROCEDURE — RXMED WILLOW AMBULATORY MEDICATION CHARGE

## 2024-08-23 RX ORDER — SEMAGLUTIDE 2.68 MG/ML
INJECTION, SOLUTION SUBCUTANEOUS
Qty: 3 ML | Refills: 5 | Status: SHIPPED | OUTPATIENT
Start: 2024-08-23 | End: 2025-08-23

## 2024-08-26 ENCOUNTER — PHARMACY VISIT (OUTPATIENT)
Dept: PHARMACY | Facility: CLINIC | Age: 69
End: 2024-08-26
Payer: COMMERCIAL

## 2024-08-30 ENCOUNTER — PATIENT OUTREACH (OUTPATIENT)
Dept: PRIMARY CARE | Facility: CLINIC | Age: 69
End: 2024-08-30
Payer: COMMERCIAL

## 2024-08-30 NOTE — PROGRESS NOTES
14 day call to patient after hospitalization. She did not see PCP within 14 days of discharge.  At time of outreach call the patient feels as if their condition has improved since last visit.

## 2024-09-02 NOTE — PROGRESS NOTES
Patient ID: Simran Burroughs is a 69 y.o. female.  Referring Physician: No referring provider defined for this encounter.  Primary Care Provider: Sarina Don PA-C      Reason for Consultation: uterine mass    Subjective    68yo  with incidentally found uterine mass during PCP workup of vaginal spotting x1 year. Last GYN evaluation >10 years - previously seen by Tammy Hansen for primary care and transitioning to new provider.     Overall recovering well from hysterectomy. Notes some spotting initially after surgery. Curious about the pathology report. Eating and drinking well. Moving bowels normally. No issues with pain or incisions.     A comprehensive review of systems was performed and otherwise negative.    Objective   BSA: 1.72 meters squared  BP (!) 183/73 (BP Location: Right arm, Patient Position: Sitting, BP Cuff Size: Adult)   Pulse 82   Temp 36.6 °C (97.9 °F) (Temporal)   Resp 16   Wt 67.3 kg (148 lb 5.9 oz)   SpO2 96%   BMI 27.13 kg/m²     Past Medical History:   Diagnosis Date    Arthritis     Coronary artery disease     Fracture, humerus     Hyperlipidemia     Hypertension     Left shoulder pain     Myocardial infarction (Multi)     Other specified diabetes mellitus without complications (Multi)     Diabetes mellitus of other type without complication    Personal history of other diseases of the circulatory system     History of hypertension    Type 2 diabetes mellitus (Multi)    - HTN, HLD  - CAD s/p triple bypass (age 59) - Dr Gross in [place]  - Renal insufficiency   - Insulin-dependent diabetes   - Depression     Past Surgical History:   Procedure Laterality Date    CORONARY ARTERY BYPASS GRAFT      OTHER SURGICAL HISTORY  2021    Heart surgery   - bilateral cateract surgery   - TLH/BSO/SLN     Family History   Problem Relation Name Age of Onset    Diabetes Mother      Lymphoma Brother      Cancer Brother     - Last colonoscopy 2020  - Last mammogram 2021 - Cat 2      OB/GYN Hx  - FTSVD x2  - Menopausal age 56; +PMB x1 year   - Last Pap: uncertain     Simran Burroughs  reports that she has never smoked. She has been exposed to tobacco smoke. She has never used smokeless tobacco.  She  reports that she does not currently use alcohol after a past usage of about 2.0 standard drinks of alcohol per week. 1-2x per month   She  reports no history of drug use.  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     Appearance: Normal appearance. She is not toxic-appearing.   HENT:      Head: Normocephalic.      Mouth/Throat:      Mouth: Mucous membranes are moist.      Pharynx: Oropharynx is clear.   Eyes:      Extraocular Movements: Extraocular movements intact.      Conjunctiva/sclera: Conjunctivae normal.      Pupils: Pupils are equal, round, and reactive to light.   Cardiovascular:      Rate and Rhythm: Normal rate and regular rhythm.      Heart sounds: Normal heart sounds. No murmur heard.     No friction rub. No gallop.   Pulmonary:      Effort: Pulmonary effort is normal.      Breath sounds: Normal breath sounds. No wheezing or rhonchi.   Abdominal:      General: Bowel sounds are normal. There is no distension.      Palpations: Abdomen is soft.      Tenderness: There is no abdominal tenderness.      Comments: Well healing laparoscopic incisions    Musculoskeletal:         General: Normal range of motion.      Cervical back: Normal range of motion.   Skin:     General: Skin is warm.   Neurological:      General: No focal deficit present.      Mental Status: She is alert and oriented to person, place, and time.   Psychiatric:         Mood and Affect: Mood normal.         Behavior: Behavior normal.         Recent Imaging;  24 - MRI Pelvis  IMPRESSION:  Postcontrast images are limited given motion artifact and body habitus.  1. Infiltrative polypoidal endometrial  soft tissue mass measuring 3.8 x 2.9 cm with a evidence of surrounding foci of myometrial wall invasion (mainly along the right anterior aspect) along with upstream endometrial canal distention. There is apparent asymmetric right lower extra uterine soft tissue fullness beyond the uterine confine highly concerning for extension. The mass is abutting the adjacent small bowel loops but no definite invasion as well as no definite urinary bladder involvement. Findings are highly concerning for endometrial carcinoma and advise tissue sampling.   2. No grossly enlarged pelvic lymph nodes    24 - US Pelvis  IMPRESSION:  1. Large 6.1 cm complex cystic and frond-like solid mass involving the majority of the uterus without discrete visualization of the endometrium. Differential includes a neoplasm of endometrial origin, versus less likely myometrial origin with obscuration of the endometrium. Recommend correlation with endometrial biopsy and MRI pelvis with and without contrast.    2. Normal right ovary. Left ovary not visualized.    Performance Status:  Symptomatic; fully ambulatory    Assessment/Plan    68yo  with low grade uterine cancer, endometrioid type. Multiple medical comorbidities: HTN, CAD s/p CABGx3, IDDM with renal insufficiency. ECOG 0. Now s/p TLH/BSO/SLN with path pending     Diagnoses and all orders for this visit:  Endometrial adenocarcinoma  - EMB results reviewed, hysterectomy pathology pending  - We reviewed the pathophysiology of endometrial cancer, meaning of stage, grade, and histology  - pathology pending   - Follow-up in 2 weeks with Dr. Pablo (to review pathology)  Post-op  - Recovering well  - Discussed ongoing restrictions (no lifting more than 10-15lbs, nothing per vagina, no soaking)  Elevated blood pressure reading in office with diagnosis of hypertension  S/P coronary artery bypass graft x 3  - Follow up with Cardiology pending; asymptomatic with severely elevated blood pressures at  time of initial consultation  - Continue ongoing medical optimization; precautions reviewed     Yane Mustafa MD

## 2024-09-03 ENCOUNTER — OFFICE VISIT (OUTPATIENT)
Dept: GYNECOLOGIC ONCOLOGY | Facility: CLINIC | Age: 69
End: 2024-09-03
Payer: COMMERCIAL

## 2024-09-03 VITALS
TEMPERATURE: 97.9 F | HEART RATE: 82 BPM | BODY MASS INDEX: 27.13 KG/M2 | RESPIRATION RATE: 16 BRPM | OXYGEN SATURATION: 96 % | DIASTOLIC BLOOD PRESSURE: 73 MMHG | WEIGHT: 148.37 LBS | SYSTOLIC BLOOD PRESSURE: 183 MMHG

## 2024-09-03 DIAGNOSIS — Z90.710 S/P HYSTERECTOMY WITH OOPHORECTOMY: ICD-10-CM

## 2024-09-03 DIAGNOSIS — Z90.721 S/P HYSTERECTOMY WITH OOPHORECTOMY: ICD-10-CM

## 2024-09-03 DIAGNOSIS — C54.1 MALIGNANT NEOPLASM OF ENDOMETRIUM (MULTI): Primary | ICD-10-CM

## 2024-09-03 PROCEDURE — 3062F POS MACROALBUMINURIA REV: CPT | Performed by: STUDENT IN AN ORGANIZED HEALTH CARE EDUCATION/TRAINING PROGRAM

## 2024-09-03 PROCEDURE — 1160F RVW MEDS BY RX/DR IN RCRD: CPT | Performed by: STUDENT IN AN ORGANIZED HEALTH CARE EDUCATION/TRAINING PROGRAM

## 2024-09-03 PROCEDURE — 1123F ACP DISCUSS/DSCN MKR DOCD: CPT | Performed by: STUDENT IN AN ORGANIZED HEALTH CARE EDUCATION/TRAINING PROGRAM

## 2024-09-03 PROCEDURE — 1126F AMNT PAIN NOTED NONE PRSNT: CPT | Performed by: STUDENT IN AN ORGANIZED HEALTH CARE EDUCATION/TRAINING PROGRAM

## 2024-09-03 PROCEDURE — 1159F MED LIST DOCD IN RCRD: CPT | Performed by: STUDENT IN AN ORGANIZED HEALTH CARE EDUCATION/TRAINING PROGRAM

## 2024-09-03 PROCEDURE — 3077F SYST BP >= 140 MM HG: CPT | Performed by: STUDENT IN AN ORGANIZED HEALTH CARE EDUCATION/TRAINING PROGRAM

## 2024-09-03 PROCEDURE — 1036F TOBACCO NON-USER: CPT | Performed by: STUDENT IN AN ORGANIZED HEALTH CARE EDUCATION/TRAINING PROGRAM

## 2024-09-03 PROCEDURE — 3048F LDL-C <100 MG/DL: CPT | Performed by: STUDENT IN AN ORGANIZED HEALTH CARE EDUCATION/TRAINING PROGRAM

## 2024-09-03 PROCEDURE — 1111F DSCHRG MED/CURRENT MED MERGE: CPT | Performed by: STUDENT IN AN ORGANIZED HEALTH CARE EDUCATION/TRAINING PROGRAM

## 2024-09-03 PROCEDURE — 4010F ACE/ARB THERAPY RXD/TAKEN: CPT | Performed by: STUDENT IN AN ORGANIZED HEALTH CARE EDUCATION/TRAINING PROGRAM

## 2024-09-03 PROCEDURE — 3078F DIAST BP <80 MM HG: CPT | Performed by: STUDENT IN AN ORGANIZED HEALTH CARE EDUCATION/TRAINING PROGRAM

## 2024-09-03 PROCEDURE — 99211 OFF/OP EST MAY X REQ PHY/QHP: CPT | Performed by: STUDENT IN AN ORGANIZED HEALTH CARE EDUCATION/TRAINING PROGRAM

## 2024-09-03 ASSESSMENT — PAIN SCALES - GENERAL: PAINLEVEL: 0-NO PAIN

## 2024-09-06 LAB
LAB AP ASR DISCLAIMER: NORMAL
LAB AP BLOCK FOR ADDITIONAL STUDIES: NORMAL
LABORATORY COMMENT REPORT: NORMAL
PATH REPORT.ADDENDUM SPEC: NORMAL
PATH REPORT.ADDENDUM SPEC: NORMAL
PATH REPORT.FINAL DX SPEC: NORMAL
PATH REPORT.GROSS SPEC: NORMAL
PATH REPORT.RELEVANT HX SPEC: NORMAL
PATH REPORT.TOTAL CANCER: NORMAL
PATHOLOGY SYNOPTIC REPORT: NORMAL

## 2024-09-06 PROCEDURE — 88341 IMHCHEM/IMCYTCHM EA ADD ANTB: CPT | Mod: TC,SUR | Performed by: STUDENT IN AN ORGANIZED HEALTH CARE EDUCATION/TRAINING PROGRAM

## 2024-09-06 PROCEDURE — 88341 IMHCHEM/IMCYTCHM EA ADD ANTB: CPT | Performed by: PATHOLOGY

## 2024-09-06 PROCEDURE — 88342 IMHCHEM/IMCYTCHM 1ST ANTB: CPT | Performed by: PATHOLOGY

## 2024-09-19 ENCOUNTER — OFFICE VISIT (OUTPATIENT)
Dept: GYNECOLOGIC ONCOLOGY | Facility: CLINIC | Age: 69
End: 2024-09-19
Payer: COMMERCIAL

## 2024-09-19 VITALS
DIASTOLIC BLOOD PRESSURE: 80 MMHG | TEMPERATURE: 98.6 F | BODY MASS INDEX: 26.91 KG/M2 | WEIGHT: 147.16 LBS | RESPIRATION RATE: 18 BRPM | OXYGEN SATURATION: 96 % | SYSTOLIC BLOOD PRESSURE: 162 MMHG | HEART RATE: 74 BPM

## 2024-09-19 DIAGNOSIS — C54.1 MALIGNANT NEOPLASM OF ENDOMETRIUM (MULTI): Primary | ICD-10-CM

## 2024-09-19 DIAGNOSIS — Z90.710 S/P HYSTERECTOMY WITH OOPHORECTOMY: ICD-10-CM

## 2024-09-19 DIAGNOSIS — Z90.721 S/P HYSTERECTOMY WITH OOPHORECTOMY: ICD-10-CM

## 2024-09-19 DIAGNOSIS — R39.15 URINARY URGENCY: ICD-10-CM

## 2024-09-19 PROCEDURE — 81001 URINALYSIS AUTO W/SCOPE: CPT | Performed by: STUDENT IN AN ORGANIZED HEALTH CARE EDUCATION/TRAINING PROGRAM

## 2024-09-19 PROCEDURE — 3079F DIAST BP 80-89 MM HG: CPT | Performed by: STUDENT IN AN ORGANIZED HEALTH CARE EDUCATION/TRAINING PROGRAM

## 2024-09-19 PROCEDURE — 3048F LDL-C <100 MG/DL: CPT | Performed by: STUDENT IN AN ORGANIZED HEALTH CARE EDUCATION/TRAINING PROGRAM

## 2024-09-19 PROCEDURE — 4010F ACE/ARB THERAPY RXD/TAKEN: CPT | Performed by: STUDENT IN AN ORGANIZED HEALTH CARE EDUCATION/TRAINING PROGRAM

## 2024-09-19 PROCEDURE — 1159F MED LIST DOCD IN RCRD: CPT | Performed by: STUDENT IN AN ORGANIZED HEALTH CARE EDUCATION/TRAINING PROGRAM

## 2024-09-19 PROCEDURE — 1126F AMNT PAIN NOTED NONE PRSNT: CPT | Performed by: STUDENT IN AN ORGANIZED HEALTH CARE EDUCATION/TRAINING PROGRAM

## 2024-09-19 PROCEDURE — 3062F POS MACROALBUMINURIA REV: CPT | Performed by: STUDENT IN AN ORGANIZED HEALTH CARE EDUCATION/TRAINING PROGRAM

## 2024-09-19 PROCEDURE — 1123F ACP DISCUSS/DSCN MKR DOCD: CPT | Performed by: STUDENT IN AN ORGANIZED HEALTH CARE EDUCATION/TRAINING PROGRAM

## 2024-09-19 PROCEDURE — 99211 OFF/OP EST MAY X REQ PHY/QHP: CPT | Performed by: STUDENT IN AN ORGANIZED HEALTH CARE EDUCATION/TRAINING PROGRAM

## 2024-09-19 PROCEDURE — 1036F TOBACCO NON-USER: CPT | Performed by: STUDENT IN AN ORGANIZED HEALTH CARE EDUCATION/TRAINING PROGRAM

## 2024-09-19 PROCEDURE — 3077F SYST BP >= 140 MM HG: CPT | Performed by: STUDENT IN AN ORGANIZED HEALTH CARE EDUCATION/TRAINING PROGRAM

## 2024-09-19 PROCEDURE — 1160F RVW MEDS BY RX/DR IN RCRD: CPT | Performed by: STUDENT IN AN ORGANIZED HEALTH CARE EDUCATION/TRAINING PROGRAM

## 2024-09-19 ASSESSMENT — ENCOUNTER SYMPTOMS
DEPRESSION: 0
OCCASIONAL FEELINGS OF UNSTEADINESS: 0
LOSS OF SENSATION IN FEET: 1

## 2024-09-19 ASSESSMENT — COLUMBIA-SUICIDE SEVERITY RATING SCALE - C-SSRS
6. HAVE YOU EVER DONE ANYTHING, STARTED TO DO ANYTHING, OR PREPARED TO DO ANYTHING TO END YOUR LIFE?: NO
1. IN THE PAST MONTH, HAVE YOU WISHED YOU WERE DEAD OR WISHED YOU COULD GO TO SLEEP AND NOT WAKE UP?: NO
2. HAVE YOU ACTUALLY HAD ANY THOUGHTS OF KILLING YOURSELF?: NO

## 2024-09-19 ASSESSMENT — PAIN SCALES - GENERAL: PAINLEVEL: 0-NO PAIN

## 2024-09-19 NOTE — PROGRESS NOTES
Patient ID: Simran Burroughs is a 69 y.o. female.  Referring Physician: No referring provider defined for this encounter.  Primary Care Provider: Sarina Don PA-C    Subjective    Patient presents today in posteroperative follow up s/p TLH/BSO, sentinel lymph node biopsies on 8/14/24. She reports she is overall recovering as anticipated. She states she is currently requiring no medications for pain control. Denies nausea/vomiting, fevers, chills, chest pain or shortness of breath. Denies redness/drainage from surgical site. Voiding and having regular bowel movements since her surgical procedure; no other interval changes or concerns.     A comprehensive review of systems was performed and otherwise negative.     Objective    BSA: 1.71 meters squared  /80 (BP Location: Left arm, Patient Position: Sitting, BP Cuff Size: Adult long)   Pulse 74   Temp 37 °C (98.6 °F) (Temporal)   Resp 18   Wt 66.7 kg (147 lb 2.5 oz)   SpO2 96%   BMI 26.91 kg/m²      Physical Exam  Vitals and nursing note reviewed. Exam conducted with a chaperone present.   Constitutional:       Appearance: Normal appearance. She is normal weight.   HENT:      Head: Normocephalic and atraumatic.      Mouth/Throat:      Mouth: Mucous membranes are moist.   Eyes:      Extraocular Movements: Extraocular movements intact.      Conjunctiva/sclera: Conjunctivae normal.      Pupils: Pupils are equal, round, and reactive to light.   Cardiovascular:      Rate and Rhythm: Normal rate.   Abdominal:      General: Bowel sounds are normal.      Palpations: Abdomen is soft. There is no mass.      Tenderness: There is no guarding or rebound.      Hernia: No hernia is present.      Comments: Laparoscopic incisions C/D/I without redness, drainage or erythema   Genitourinary:     General: Normal vulva.      Vagina: Normal. No vaginal discharge, erythema or lesions.      Comments: Surgically absent uterus and cervix  Intact vaginal cuff without separation or  drainage   Musculoskeletal:         General: Normal range of motion.      Cervical back: Normal range of motion and neck supple.   Skin:     General: Skin is warm.      Findings: No erythema or rash.   Neurological:      General: No focal deficit present.      Mental Status: She is alert and oriented to person, place, and time. Mental status is at baseline.   Psychiatric:         Mood and Affect: Mood normal.         Behavior: Behavior normal.     Pathology - 8/14/24  ENDOMETRIUM   8th Edition - Protocol posted: 12/14/2022  ENDOMETRIUM - All Specimens  SPECIMEN   Procedure  Total hysterectomy and bilateral salpingo-oophorectomy   TUMOR   Tumor Site  Endometrium   Histologic Type  Endometrioid carcinoma, NOS   Histologic Grade  FIGO grade 1   Two-Tier Grading System  Low grade (encompassing FIGO 1 and 2)   Myometrial Invasion  Present   Depth of Myometrial Invasion  10 mm   Myometrial Thickness  13 mm   Percentage of Myometrial Invasion  77 %   Adenomyosis  Not identified   Uterine Serosa Involvement  Not identified   Lower Uterine Segment Involvement  Present, myoinvasive   Cervical Stromal Involvement  Not identified   Other Tissue / Organ Involvement  Not identified   Peritoneal / Ascitic Fluid  Not submitted / unknown   Lymphatic and / or Vascular Invasion  Not identified   REGIONAL LYMPH NODES   Regional Lymph Node Status  Not applicable (no regional lymph nodes submitted or found)   pTNM CLASSIFICATION (AJCC 8th Edition)   Reporting of pT, pN, and (when applicable) pM categories is based on information available to the pathologist at the time the report is issued. As per the AJCC (Chapter 1, 8th Ed.) it is the managing physician’s responsibility to establish the final pathologic stage based upon all pertinent information, including but potentially not limited to this pathology report.   pT Category  pT1b   pN Category  pN not assigned (no nodes submitted or found)   N Suffix  (sn)   .     Performance  Status:  Asymptomatic    Assessment/Plan     Oncology History Overview Note   Stage IB endometrioid adenocarcinoma of the uterus (FIGO 1-2); pMMR, TP53 wild-type  - 8/14/24: TLH/BSO, sentinel LND (no lymphoid tissue x2)      Uterine cancer (Multi)   8/14/2024 Cancer Staged    Staging form: Corpus Uteri - Carcinoma and Carcinosarcoma, AJCC 8th Edition, Clinical stage from 8/14/2024: FIGO Stage IB, calculated as Stage Unknown (cT1b, cNX, cM0) - Signed by Ananya Pablo MD on 9/19/2024, Staging comments: MMR proficient  TP53 wild-type     8/15/2024 Initial Diagnosis    Uterine cancer (Multi)        Diagnoses and all orders for this visit:  Malignant neoplasm of endometrium (Multi)  - Surgical pathology reviewed with patient demonstrating outer 50% myoinvasive endometrial cancer (low grade; FIGO 1-2) without LVSI. No lymphoid tissue noted on sentinel lymph node biopsies consistent with Stage IB disease (pT1b, Nx, Mx)  - Referral to Radiation Oncology for evaluation re: vaginal brachytherapy  - Signs and symptoms of disease recurrence were reviewed and all questions answered. We discussed the most common site for endometrial cancer recurrence includes abdomen and pelvis with additional risks of distant metastasis in lungs or liver. Symptoms for recurrent including: changes in bowel or bladder habits, abnormal vaginal discharge, and irregular bleeding.   - The following surveillance regimen is recommended based on NCCN and SGO guidelines and modified as necessary based on individual patient circumstances:            Surveillance Test Year 1 and 2  Year 3             Year 4 and 5            Physical Exam   3 months  6 months    6 months            Imaging   as needed  - Survivorship issues were addressed. Patient advised to return to office sooner if concerns/interim changes in health status. NP visits as karo/Samy annually  S/P hysterectomy with oophorectomy  - Meeting appropriate postoperative milestones; liberalize  activity as tolerated   -     Referral to Radiation Oncology; Future    Ananya Pablo MD

## 2024-09-20 LAB
APPEARANCE UR: CLEAR
BILIRUB UR STRIP.AUTO-MCNC: NEGATIVE MG/DL
COLOR UR: ABNORMAL
GLUCOSE UR STRIP.AUTO-MCNC: ABNORMAL MG/DL
KETONES UR STRIP.AUTO-MCNC: NEGATIVE MG/DL
LEUKOCYTE ESTERASE UR QL STRIP.AUTO: NEGATIVE
NITRITE UR QL STRIP.AUTO: NEGATIVE
PH UR STRIP.AUTO: 5.5 [PH]
PROT UR STRIP.AUTO-MCNC: ABNORMAL MG/DL
RBC # UR STRIP.AUTO: NEGATIVE /UL
RBC #/AREA URNS AUTO: NORMAL /HPF
SP GR UR STRIP.AUTO: 1.03
SQUAMOUS #/AREA URNS AUTO: NORMAL /HPF
UROBILINOGEN UR STRIP.AUTO-MCNC: NORMAL MG/DL
WBC #/AREA URNS AUTO: NORMAL /HPF

## 2024-09-23 ENCOUNTER — APPOINTMENT (OUTPATIENT)
Dept: RADIATION ONCOLOGY | Facility: HOSPITAL | Age: 69
End: 2024-09-23
Payer: COMMERCIAL

## 2024-09-23 PROCEDURE — 77263 THER RADIOLOGY TX PLNG CPLX: CPT | Performed by: STUDENT IN AN ORGANIZED HEALTH CARE EDUCATION/TRAINING PROGRAM

## 2024-09-23 NOTE — PROGRESS NOTES
Radiation Oncology Outpatient Consult    Patient Name:  Simran Burroughs  MRN:  [id number]  :  1955    Referring Provider: Ananya Pablo MD  Primary Care Provider: Sarina Don PA-C  Care Team: Patient Care Team:  Sarina Don PA-C as PCP - General (Family Medicine)  Sarina Don PA-C as PCP - MMO ACO PCP  Ananya Pablo MD as Consulting Physician (Obstetrics and Gynecology)  Yane Mustafa MD as Consulting Physician (Obstetrics and Gynecology)  Daina Mai as Care Manager (Case Management)    Date of Service: 2024     DIAGNOSIS:  Stage IB, pT1b pNx cM0 endometrial carcinoma, endometroid type, 77% MMI, -LVSI, p53wt, MMRp.     CANCER HISTORY:  -2024: presented with 1 year vaginal spotting  -05/15/2024 TVUS; Large 6.1 cm complex cystic and frond-like solid mass involving the majority of the uterus  -2024 MRI pelvis; Infiltrative polypoidal endometrial soft tissue mass measuring 3.8 x 2.9 cm with a evidence of surrounding foci of myometrial wall invasion   -2024 endometrial biopsy; low grade endometrioid type endometiral carcinoma   -2024 DALIA,BSO,SLND; endometrial adenocarcinoma, endometroid type, pT1b Nx , MMR p, P53wt, FIGO Stage IB, Grade I    SUBJECTIVE  History of Present Illness:  Ms. Burroughs is a 69-year-old female who initially presented to her PCP with concerns of intermittent vaginal spotting for the past year. She denies any abdominal pain.    She underwent a transvaginal ultrasound on 5/15/2024, which revealed The uterus measures 8.2 x 4.8 x 6.5 cm. A large complex cystic and frond-like solid mass involving the majority of the uterus, measuring approximately 5.1 x 3.5 x 6.1 cm. A discrete endometrial stripe was not visible, suggesting the mass may be of endometrial origin, with less likelihood of myometrial origin, as there is effacement/obscuration of the endometrium. The fluid component located superiorly was hypoechoic, possibly due to debris, hemorrhage, or  necrosis. The solid isoechoic component projected into the fluid component.    She then underwent an MRI of the pelvis on 6/5/2024, which showed an infiltrative polypoidal endometrial soft tissue mass measuring 3.8 x 2.9 cm, with evidence of anterior myometrial wall invasion and upstream endometrial canal distention. The mass was abutting/partially involving the upper endocervix and showed signs of asymmetric right lower uterine segment extra-uterine extension. It was also abutting adjacent small bowel loops without definitive invasion.    An endometrial biopsy on 7/11/2024 revealed low-grade endometrioid-type endometrial carcinoma embedded in mucus.    Ms. Bruroughs was seen by Dr. Pablo and underwent a total abdominal hysterectomy, bilateral salpingo-oophorectomy, and sentinel lymph node dissection (DALIA/BSO/SLND) on 8/14/2024. Pathology revealed endometrial adenocarcinoma, endometrioid type, FIGO grade I, with 10 mm of myometrial invasion, a 13 mm myometrial thickness (77% MMI), pT1b Nx, MMR p, P53wt.  She is presenting today to discuss vaginal cuff brachytherapy. Today, she presented alone. She has recovered very well from the surgery and is still experiencing mild fatigue but is able to perform all her daily activities independently. Denies any vaginal bleeding/spot. She reports having vulvar itching for a while. A recent lab test was negative for a urinary infection.    Fatigue  [] None   [X] Grade 1 fatigue: Relieved by rest   [] Grade 2 fatigue: Not relieved by rest; limiting instrumental ADL  [] Grade 3 fatigue: Not relieved by rest, limiting self care ADL    GI:  Bowel complaints:   -Bleeding no  -Frequency no  -Incontinence no  -Pain no    :  Urinary complaints  -Bleeding no  -Frequency no  -Incontinence no    Sexually active: no  Time since sexually active: no    Ananya Washburn MD referred the patient for evaluation and treatment for radiation.   I personally reviewed the available outside records  and imaging studies.    Ms. Burroughs has stage IB endometrial cancer with high risk features (stage IB, Age more than 60), which left untreated can lead to progression and can be life threatening.        2   0  Onset of menses 11  Onset of menopause 56  OCP use no  Estrogen replacement no  Smoking no  History of prior STI no    A >10 point review of systems was performed and was negative unless mentioned in the Interval History above.    Prior Radiotherapy:  No    Current Systemic Treatment:  No     Presence of Pacemaker or ICD:  No    Past Medical History:    Past Medical History:   Diagnosis Date    Arthritis     Coronary artery disease     Fracture, humerus     Hyperlipidemia     Hypertension     Left shoulder pain     Myocardial infarction (Multi)     Other specified diabetes mellitus without complications (Multi)     Diabetes mellitus of other type without complication    Personal history of other diseases of the circulatory system     History of hypertension    Type 2 diabetes mellitus (Multi)         Past Surgical History:    Past Surgical History:   Procedure Laterality Date    CORONARY ARTERY BYPASS GRAFT      OTHER SURGICAL HISTORY  2021    Heart surgery        Family History:  Cancer-related family history includes Cancer in her brother; Lymphoma in her brother.    Social History:    Social History     Tobacco Use    Smoking status: Never     Passive exposure: Current    Smokeless tobacco: Never   Vaping Use    Vaping status: Never Used   Substance Use Topics    Alcohol use: Not Currently     Alcohol/week: 2.0 standard drinks of alcohol     Types: 2 Shots of liquor per week     Comment: occasionally    Drug use: Never       Allergies:    Allergies   Allergen Reactions    Naproxen Sodium Other and Unknown     HYPOTENSION    Lisinopril Cough        Medications:    Current Outpatient Medications:     amLODIPine (Norvasc) 10 mg tablet, Take 1 tablet (10 mg) by mouth once daily.,  "Disp: 90 tablet, Rfl: 3    blood-glucose sensor (FreeStyle Sulma 3 Sensor) device, USE AS DIRECTED CHANGE SENSOR EVERY 14 DAYS, Disp: 2 each, Rfl: 11    carvedilol (Coreg) 12.5 mg tablet, Take 1 tablet (12.5 mg) by mouth 2 times a day., Disp: 180 tablet, Rfl: 3    cholecalciferol (Vitamin D-3) 125 MCG (5000 UT) capsule, Take 1 capsule (125 mcg) by mouth once daily., Disp: , Rfl:     cyanocobalamin (Vitamin B-12) 1,000 mcg tablet, Take 1 tablet (1,000 mcg) by mouth once daily., Disp: , Rfl:     dapagliflozin propanediol (Farxiga) 10 mg, TAKE 1 TABLET BY MOUTH ONE TIME DAILY, Disp: 30 tablet, Rfl: 5    escitalopram (Lexapro) 10 mg tablet, Take 1 tablet (10 mg) by mouth once daily., Disp: 90 tablet, Rfl: 1    insulin degludec (Tresiba FlexTouch U-100) 100 unit/mL (3 mL) injection, INJECT 64 UNITS SUBCUTANEOUSLY ONE TIME DAILY AT BEDTIME (Patient taking differently: 32 units daily), Disp: 30 mL, Rfl: 5    losartan (Cozaar) 100 mg tablet, Take 1 tablet (100 mg) by mouth once daily., Disp: 90 tablet, Rfl: 3    pen needle, diabetic 32 gauge x 5/32\" needle, Inject 1 Pen needle under the skin 4 times a day.  use 4 pen needles per day to inject insulin, Disp: , Rfl:     polyethylene glycol (Glycolax, Miralax) 17 gram packet, Take 17 g by mouth once daily as needed (constipation)., Disp: , Rfl:     pravastatin (Pravachol) 20 mg tablet, Take 1 tablet (20 mg) by mouth once daily at bedtime., Disp: 90 tablet, Rfl: 3    semaglutide (Ozempic) 2 mg/dose (8 mg/3 mL) pen injector, INJECT 2 MG UNDER THE SKIN ONCE WEEKLY AS DIRECTED, Disp: 3 mL, Rfl: 5    simethicone (Mylicon) 80 mg chewable tablet, Chew 1 tablet (80 mg) 4 times a day as needed (gas pain)., Disp: , Rfl:       Review of Systems:   Constitutional: Negative.    HENT:  Negative.     Eyes: Negative.    Respiratory: Negative.     Cardiovascular: Negative.    Gastrointestinal: Negative.    Endocrine: Negative.    Genitourinary: Negative.     Musculoskeletal: Negative.    Skin: " Negative.    Neurological: Negative.    Hematological: Negative.    Psychiatric/Behavioral: Negative.      The patient's current pain level was assessed.  They report currently having a pain of 0 out of 10.  They feel their pain is under control without the use of pain medications.      Performance Status:  The Karnofsky performance scale today is 100, Fully active, able to carry on all pre-disease performed without restriction (ECOG equivalent 0).        OBJECTIVE  Physical Exam:  /71   Pulse 69   Temp 36.4 °C (97.5 °F) (Skin)   Resp 18   Wt 67.8 kg (149 lb 8 oz)   SpO2 95%   BMI 27.34 kg/m²    Gen: well appearing, in NAD  ENT: Eyes symmetric  Resp: Breathing comfortably in room air  CV: Normal perfusion  Abd: Soft and non-distended    Gyn:   External genitalia: Normal external genitalia, no vulvar masses or lesions  Vagina: no blood or abnormal discharge  BME:   -s/p hysterectomy: cervix, uterus and adnexae surgically absent  SSE:    -Vaginal cuff healed very well. (Cylinder 3cm, VL 11cm)    Extremities: symmetric  Skin: Normal    Laboratory Review:  There are no laboratory contraindications to radiation therapy.    The pertinent lab results were reviewed and discussed with the patient.       Pathology Review:  The pertinent pathology results were reviewed and discussed with the patient.     FINAL DIAGNOSIS   A. ENDOMETRIUM BIOPSY:   -- SCANT SMALL FRAGMENTS WITH FEATURES CONSISTENT WITH LOW-GRADE ENDOMETRIOID TYPE ENDOMETRIAL CARCINOMA EMBEDDED IN MUCUS, SEE COMMENT     FINAL DIAGNOSIS      A. Left pelvic sentinel lymph node, Excision:  -- Fibroadipose tissue negative for tumor; see note.  -- No lymphoid tissue identified.     B. Right pelvic sentinel lymph node, Excision:  -- Fibroadipose tissue negative for tumor; see note.  -- No lymphoid tissue identified.     Note: Tissue for parts A and B have been entirely submitted for histologic examination.     C. Uterus, cervix, fallopian tubes and ovaries,  Total hysterectomy and bilateral salpingo-oophorectomy:  -- Endometrial adenocarcinoma, endometrioid type, FIGO grade 1, with focal squamous metaplasia, invasive into the outer portion of the myometrium; see note and synoptic report.  -- Ovaries and fallopian tubes negative for tumor.     Note: Immunohistochemical staining for mismatch repair proteins and p53 will be performed and reported in addenda.       Electronically signed by Maureen Love MD on 9/5/2024 at 1355        By the signature on this report, the individual or group listed as making the Final Interpretation/Diagnosis certifies that they have reviewed this case.    Case Summary Report   ENDOMETRIUM   8th Edition - Protocol posted: 12/14/2022ENDOMETRIUM - All Specimens  SPECIMEN   Procedure  Total hysterectomy and bilateral salpingo-oophorectomy   TUMOR   Tumor Site  Endometrium   Histologic Type  Endometrioid carcinoma, NOS   Histologic Grade  FIGO grade 1   Two-Tier Grading System  Low grade (encompassing FIGO 1 and 2)   Myometrial Invasion  Present   Depth of Myometrial Invasion  10 mm   Myometrial Thickness  13 mm   Percentage of Myometrial Invasion  77 %   Adenomyosis  Not identified   Uterine Serosa Involvement  Not identified   Lower Uterine Segment Involvement  Present, myoinvasive   Cervical Stromal Involvement  Not identified   Other Tissue / Organ Involvement  Not identified   Peritoneal / Ascitic Fluid  Not submitted / unknown   Lymphatic and / or Vascular Invasion  Not identified   REGIONAL LYMPH NODES   Regional Lymph Node Status  Not applicable (no regional lymph nodes submitted or found)   pTNM CLASSIFICATION (AJCC 8th Edition)   Reporting of pT, pN, and (when applicable) pM categories is based on information available to the pathologist at the time the report is issued. As per the AJCC (Chapter 1, 8th Ed.) it is the managing physician’s responsibility to establish the final pathologic stage based upon all pertinent  information, including but potentially not limited to this pathology report.   pT Category  pT1b   pN Category  pN not assigned (no nodes submitted or found)   N Suffix  (sn)   .      Block for Additional Biomarkers/Molecular Studies    Normal Block: C1  Tumor Block: C6   Addendum   MISMATCH REPAIR PROTEIN EXPRESSION        INTERPRETATION: Endometrial carcinoma with normal mismatch repair protein expression.        TEST RESULTS:  PROTEIN          RESULT              MLH-1 :              POSITIVE                                        PMS-2 :             POSITIVE                                        MSH-2 :             POSITIVE                                      MSH-6 :             POSITIVE                                            Addendum   TP53 IMMUNOHISTOCHEMISTRY FOR GYNECOLOGIC MALIGNANCIES        INTERPRETATION:  Normal/wild-type  (PATTERN: Patchy nuclear staining)                      Imaging:  The pertinent imaging results were reviewed and discussed with the patient.         ASSESSMENT/PLAN:   Simran Burroughs is a 69 y.o. female with Stage IB,pT1b, Nx, Mx endometrial carcinoma, endometroid type, 77%MMI, -LVSI, p53wt, MMRp.   She has high risk features including her age, stage IB and MMI (77%).   We reviewed the role and rationale for adjuvant radiotherapy (VBT and/or EBRT) in the management of HIR endometrial carcinoma. We discussed the benefits of VBT with regard to reducing the risk of vaginal cuff failures while not impacting pelvic recurrence and EBRT in reducing the rates of vaginal and pelvic recurrence rate. We discussed the logistics and comparative toxicities between the two treatment modalities including, but not limited to bowel, bladder and vaginal toxicity.   The patient opted to proceed with VBT. She expressed understanding and agreement with the plan.    -We will schedule CT SIM  -Plan to proceed with VBT 3-5 fractions     NCCN Guidelines were applicable to guide this patients treatment  plan.   Discussed with attending faculty, Dr. Monie Duncan MD  PGY-5, Radiation Oncology  9/24/2024 4:37 PM

## 2024-09-24 ENCOUNTER — HOSPITAL ENCOUNTER (OUTPATIENT)
Dept: RADIATION ONCOLOGY | Facility: HOSPITAL | Age: 69
Setting detail: RADIATION/ONCOLOGY SERIES
Discharge: HOME | End: 2024-09-24
Payer: COMMERCIAL

## 2024-09-24 VITALS
BODY MASS INDEX: 27.34 KG/M2 | OXYGEN SATURATION: 95 % | TEMPERATURE: 97.5 F | WEIGHT: 149.5 LBS | HEART RATE: 69 BPM | RESPIRATION RATE: 18 BRPM | SYSTOLIC BLOOD PRESSURE: 153 MMHG | DIASTOLIC BLOOD PRESSURE: 71 MMHG

## 2024-09-24 DIAGNOSIS — C54.1 MALIGNANT NEOPLASM OF ENDOMETRIUM (MULTI): Primary | ICD-10-CM

## 2024-09-24 DIAGNOSIS — Z90.710 S/P HYSTERECTOMY WITH OOPHORECTOMY: ICD-10-CM

## 2024-09-24 DIAGNOSIS — Z90.721 S/P HYSTERECTOMY WITH OOPHORECTOMY: ICD-10-CM

## 2024-09-24 PROCEDURE — 99205 OFFICE O/P NEW HI 60 MIN: CPT | Performed by: STUDENT IN AN ORGANIZED HEALTH CARE EDUCATION/TRAINING PROGRAM

## 2024-09-24 PROCEDURE — 99215 OFFICE O/P EST HI 40 MIN: CPT | Performed by: STUDENT IN AN ORGANIZED HEALTH CARE EDUCATION/TRAINING PROGRAM

## 2024-09-24 ASSESSMENT — ENCOUNTER SYMPTOMS
EYES NEGATIVE: 1
RESPIRATORY NEGATIVE: 1
LOSS OF SENSATION IN FEET: 0
OCCASIONAL FEELINGS OF UNSTEADINESS: 0
DEPRESSION: 0
PSYCHIATRIC NEGATIVE: 1
HEMATOLOGIC/LYMPHATIC NEGATIVE: 1
MUSCULOSKELETAL NEGATIVE: 1
CARDIOVASCULAR NEGATIVE: 1
FATIGUE: 1
NEUROLOGICAL NEGATIVE: 1
CONSTIPATION: 1

## 2024-09-24 NOTE — PROGRESS NOTES
Radiation Oncology Nursing Note    Prior Radiotherapy:  No  No radiation treatments to show. (Treatments may have been administered in another system.)     Current Systemic Treatment:  No     Presence of Pacemaker or ICD:  No    History of Autoimmune or Connective Tissue Disorders:  No    Pain: The patient's current pain level was assessed.  They report currently having a pain of 0 out of 10.  They feel their pain is under control without the use of pain medications.    Review of Systems:  Review of Systems   Constitutional:  Positive for fatigue.   HENT:  Negative.     Eyes: Negative.    Respiratory: Negative.     Cardiovascular: Negative.    Gastrointestinal:  Positive for constipation.   Genitourinary:  Positive for bladder incontinence.    Musculoskeletal: Negative.    Skin: Negative.    Neurological: Negative.    Hematological: Negative.    Psychiatric/Behavioral: Negative.

## 2024-09-27 ENCOUNTER — PATIENT OUTREACH (OUTPATIENT)
Dept: PRIMARY CARE | Facility: CLINIC | Age: 69
End: 2024-09-27
Payer: COMMERCIAL

## 2024-10-01 ENCOUNTER — TELEPHONE (OUTPATIENT)
Dept: ENDOCRINOLOGY | Facility: CLINIC | Age: 69
End: 2024-10-01
Payer: COMMERCIAL

## 2024-10-01 NOTE — TELEPHONE ENCOUNTER
Pt called in because she wants to know what to do with her Ozempic because it is making her sick. She says that she can't work and doesn't fell good enough to do anything    Please advise

## 2024-10-02 ENCOUNTER — HOSPITAL ENCOUNTER (OUTPATIENT)
Dept: RADIOLOGY | Facility: EXTERNAL LOCATION | Age: 69
Discharge: HOME | End: 2024-10-02

## 2024-10-02 ENCOUNTER — HOSPITAL ENCOUNTER (OUTPATIENT)
Dept: RADIATION ONCOLOGY | Facility: HOSPITAL | Age: 69
Setting detail: RADIATION/ONCOLOGY SERIES
Discharge: HOME | End: 2024-10-02
Payer: COMMERCIAL

## 2024-10-02 DIAGNOSIS — C54.1 MALIGNANT NEOPLASM OF ENDOMETRIUM (MULTI): Primary | ICD-10-CM

## 2024-10-02 DIAGNOSIS — C54.1 MALIGNANT NEOPLASM OF ENDOMETRIUM (MULTI): ICD-10-CM

## 2024-10-02 PROCEDURE — 77290 THER RAD SIMULAJ FIELD CPLX: CPT | Performed by: STUDENT IN AN ORGANIZED HEALTH CARE EDUCATION/TRAINING PROGRAM

## 2024-10-02 PROCEDURE — 57156 INS VAG BRACHYTX DEVICE: CPT | Performed by: STUDENT IN AN ORGANIZED HEALTH CARE EDUCATION/TRAINING PROGRAM

## 2024-10-02 PROCEDURE — 77332 RADIATION TREATMENT AID(S): CPT | Performed by: STUDENT IN AN ORGANIZED HEALTH CARE EDUCATION/TRAINING PROGRAM

## 2024-10-07 ENCOUNTER — HOSPITAL ENCOUNTER (OUTPATIENT)
Dept: RADIATION ONCOLOGY | Facility: HOSPITAL | Age: 69
Setting detail: RADIATION/ONCOLOGY SERIES
Discharge: HOME | End: 2024-10-07
Payer: COMMERCIAL

## 2024-10-07 DIAGNOSIS — C55 MALIGNANT NEOPLASM OF UTERUS, PART UNSPECIFIED (MULTI): ICD-10-CM

## 2024-10-07 LAB
RAD ONC MSQ ACTUAL FRACTIONS DELIVERED: 1
RAD ONC MSQ ACTUAL SESSION DELIVERED DOSE: 600 CGRAY
RAD ONC MSQ ACTUAL TOTAL DOSE: 600 CGRAY
RAD ONC MSQ ELAPSED DAYS: 0
RAD ONC MSQ LAST DATE: NORMAL
RAD ONC MSQ PRESCRIBED FRACTIONAL DOSE: 600 CGRAY
RAD ONC MSQ PRESCRIBED NUMBER OF FRACTIONS: 5
RAD ONC MSQ PRESCRIBED TECHNIQUE: NORMAL
RAD ONC MSQ PRESCRIBED TOTAL DOSE: 3000 CGRAY
RAD ONC MSQ PRESCRIPTION PATTERN COMMENT: NORMAL
RAD ONC MSQ START DATE: NORMAL
RAD ONC MSQ TREATMENT COURSE NUMBER: 1
RAD ONC MSQ TREATMENT SITE: NORMAL

## 2024-10-07 PROCEDURE — 77332 RADIATION TREATMENT AID(S): CPT | Performed by: STUDENT IN AN ORGANIZED HEALTH CARE EDUCATION/TRAINING PROGRAM

## 2024-10-07 PROCEDURE — C1717 BRACHYTX, NON-STR,HDR IR-192: HCPCS | Performed by: STUDENT IN AN ORGANIZED HEALTH CARE EDUCATION/TRAINING PROGRAM

## 2024-10-07 PROCEDURE — 77280 THER RAD SIMULAJ FIELD SMPL: CPT | Performed by: STUDENT IN AN ORGANIZED HEALTH CARE EDUCATION/TRAINING PROGRAM

## 2024-10-07 PROCEDURE — 77770 HDR RDNCL NTRSTL/ICAV BRCHTX: CPT | Performed by: STUDENT IN AN ORGANIZED HEALTH CARE EDUCATION/TRAINING PROGRAM

## 2024-10-07 PROCEDURE — 57156 INS VAG BRACHYTX DEVICE: CPT | Performed by: STUDENT IN AN ORGANIZED HEALTH CARE EDUCATION/TRAINING PROGRAM

## 2024-10-07 NOTE — PROGRESS NOTES
Patient Name: Simran Burroughs  MRN: [id number]  : 1955  Age: 69 y.o.  Diagnosis:   1. Malignant neoplasm of uterus, part unspecified (Multi)  Rad Onc Msq Treatment Summary    Rad Onc Msq Treatment Summary        Date: 10/07/24     GYN HDR Nursing Flowsheet    Fraction: 1  Time: 1400  Assessment: Pt arrived for fraction 1, reported having comfortably full bladder.  Reviewed what to expect during today's treatment.     1400 - Escorted/transported patient from exam room after verifying patient name and birth date.  1403 - Dr. Lomax in room and applicator connected to HDR unit without difficulty.  1406 - Treatment started without difficulty.  1412 - Treatment completed without difficulty.  Applicator/transfer cables removed without difficulty.   - Reviewed discharge instructions with patient: to call with excessive bleeding, or discharge.   - Appointment card given for next treatment/follow-up on 10/9/24 at 0900.  Discharged to home in apparent stable condition.      Additional Notes: Pt tolerated fraction 1 without issue.      By: Gisel Walsh RN      Electronically signed by Gisel Walsh RN on 10/07/24 at 3:14 PM

## 2024-10-09 ENCOUNTER — HOSPITAL ENCOUNTER (OUTPATIENT)
Dept: RADIATION ONCOLOGY | Facility: HOSPITAL | Age: 69
Setting detail: RADIATION/ONCOLOGY SERIES
Discharge: HOME | End: 2024-10-09
Payer: COMMERCIAL

## 2024-10-09 DIAGNOSIS — C55 MALIGNANT NEOPLASM OF UTERUS, PART UNSPECIFIED (MULTI): ICD-10-CM

## 2024-10-09 LAB
RAD ONC MSQ ACTUAL FRACTIONS DELIVERED: 2
RAD ONC MSQ ACTUAL SESSION DELIVERED DOSE: 600 CGRAY
RAD ONC MSQ ACTUAL TOTAL DOSE: 1200 CGRAY
RAD ONC MSQ ELAPSED DAYS: 2
RAD ONC MSQ LAST DATE: NORMAL
RAD ONC MSQ PRESCRIBED FRACTIONAL DOSE: 600 CGRAY
RAD ONC MSQ PRESCRIBED NUMBER OF FRACTIONS: 5
RAD ONC MSQ PRESCRIBED TECHNIQUE: NORMAL
RAD ONC MSQ PRESCRIBED TOTAL DOSE: 3000 CGRAY
RAD ONC MSQ PRESCRIPTION PATTERN COMMENT: NORMAL
RAD ONC MSQ START DATE: NORMAL
RAD ONC MSQ TREATMENT COURSE NUMBER: 1
RAD ONC MSQ TREATMENT SITE: NORMAL

## 2024-10-09 PROCEDURE — 57156 INS VAG BRACHYTX DEVICE: CPT | Performed by: STUDENT IN AN ORGANIZED HEALTH CARE EDUCATION/TRAINING PROGRAM

## 2024-10-09 PROCEDURE — 77332 RADIATION TREATMENT AID(S): CPT | Performed by: STUDENT IN AN ORGANIZED HEALTH CARE EDUCATION/TRAINING PROGRAM

## 2024-10-09 PROCEDURE — 77280 THER RAD SIMULAJ FIELD SMPL: CPT | Performed by: STUDENT IN AN ORGANIZED HEALTH CARE EDUCATION/TRAINING PROGRAM

## 2024-10-09 PROCEDURE — C1717 BRACHYTX, NON-STR,HDR IR-192: HCPCS | Performed by: STUDENT IN AN ORGANIZED HEALTH CARE EDUCATION/TRAINING PROGRAM

## 2024-10-09 PROCEDURE — 77770 HDR RDNCL NTRSTL/ICAV BRCHTX: CPT | Performed by: STUDENT IN AN ORGANIZED HEALTH CARE EDUCATION/TRAINING PROGRAM

## 2024-10-09 NOTE — PROGRESS NOTES
Patient Name: Simran Burroughs  MRN: [id number]  : 1955  Age: 69 y.o.  Diagnosis:   1. Malignant neoplasm of uterus, part unspecified (Multi)  Rad Onc Msq Treatment Summary    Rad Onc Msq Treatment Summary        Date: 10/09/24     GYN HDR Nursing Flowsheet    Fraction: 2  Time: 10:06 AM  Assessment: pt arrived for fx2, no questions or concerns, bladder is full.    0910 - Escorted/transported patient from exam room after verifying patient name and birth date.  0912 - Dr. Lomax in room and applicator connected to HDR unit without difficulty.  09 - Treatment started without difficulty.  0928 - Treatment completed without difficulty.  0930 - Dr. Lomax in room applicator/transfer cables removed without difficulty.  0932 - Reviewed discharge instructions with patient: to call with excessive bleeding, or discharge.  0935 - Discharged to home/floor in apparent stable condition.      Additional Notes: Pt erasmo treatment well.      By: Ashley Álvarez RN      Electronically signed by Ashley Álvarez RN on 10/09/24 at 10:06 AM

## 2024-10-14 ENCOUNTER — HOSPITAL ENCOUNTER (OUTPATIENT)
Dept: RADIATION ONCOLOGY | Facility: HOSPITAL | Age: 69
Setting detail: RADIATION/ONCOLOGY SERIES
Discharge: HOME | End: 2024-10-14
Payer: COMMERCIAL

## 2024-10-14 DIAGNOSIS — C55 MALIGNANT NEOPLASM OF UTERUS, PART UNSPECIFIED (MULTI): ICD-10-CM

## 2024-10-14 LAB
RAD ONC MSQ ACTUAL FRACTIONS DELIVERED: 3
RAD ONC MSQ ACTUAL SESSION DELIVERED DOSE: 600 CGRAY
RAD ONC MSQ ACTUAL TOTAL DOSE: 1800 CGRAY
RAD ONC MSQ ELAPSED DAYS: 7
RAD ONC MSQ LAST DATE: NORMAL
RAD ONC MSQ PRESCRIBED FRACTIONAL DOSE: 600 CGRAY
RAD ONC MSQ PRESCRIBED NUMBER OF FRACTIONS: 5
RAD ONC MSQ PRESCRIBED TECHNIQUE: NORMAL
RAD ONC MSQ PRESCRIBED TOTAL DOSE: 3000 CGRAY
RAD ONC MSQ PRESCRIPTION PATTERN COMMENT: NORMAL
RAD ONC MSQ START DATE: NORMAL
RAD ONC MSQ TREATMENT COURSE NUMBER: 1
RAD ONC MSQ TREATMENT SITE: NORMAL

## 2024-10-14 PROCEDURE — 57156 INS VAG BRACHYTX DEVICE: CPT | Performed by: STUDENT IN AN ORGANIZED HEALTH CARE EDUCATION/TRAINING PROGRAM

## 2024-10-14 PROCEDURE — 77770 HDR RDNCL NTRSTL/ICAV BRCHTX: CPT | Performed by: STUDENT IN AN ORGANIZED HEALTH CARE EDUCATION/TRAINING PROGRAM

## 2024-10-14 PROCEDURE — 77332 RADIATION TREATMENT AID(S): CPT | Performed by: STUDENT IN AN ORGANIZED HEALTH CARE EDUCATION/TRAINING PROGRAM

## 2024-10-14 PROCEDURE — 77280 THER RAD SIMULAJ FIELD SMPL: CPT | Performed by: STUDENT IN AN ORGANIZED HEALTH CARE EDUCATION/TRAINING PROGRAM

## 2024-10-14 PROCEDURE — C1717 BRACHYTX, NON-STR,HDR IR-192: HCPCS | Performed by: STUDENT IN AN ORGANIZED HEALTH CARE EDUCATION/TRAINING PROGRAM

## 2024-10-14 NOTE — PROGRESS NOTES
Patient Name: Simran Burroughs  MRN: [id number]  : 1955  Age: 69 y.o.  Diagnosis:   1. Malignant neoplasm of uterus, part unspecified (Multi)  Rad Onc Msq Treatment Summary    Rad Onc Msq Treatment Summary        Date: 10/14/24     GYN HDR Nursing Flowsheet    Fraction: 3  Time: 1400  Assessment: Pt arrived for fraction 3, no bowel, bladder, bleeding or other complaints.  Pt reported having comfortably full bladder prior to treatment.    1405 - Escorted/transported patient from exam room after verifying patient name and birth date.  1410 - Dr. Mclean in room and applicator connected to HDR unit without difficulty.  1412 - Treatment started without difficulty.  1417 - Treatment completed without difficulty.  1420 - Dr. Mclean in room applicator/transfer cables removed without difficulty.   Reviewed discharge instructions with patient: to call with excessive bleeding, or discharge.   Appointment for next treatment on 10/16/24 at 1300.  Discharged to home in apparent stable condition.            By: Gisel Walsh RN      Electronically signed by Gisel Walsh RN on 10/14/24 at 2:45 PM

## 2024-10-16 ENCOUNTER — HOSPITAL ENCOUNTER (OUTPATIENT)
Dept: RADIATION ONCOLOGY | Facility: HOSPITAL | Age: 69
Setting detail: RADIATION/ONCOLOGY SERIES
Discharge: HOME | End: 2024-10-16
Payer: COMMERCIAL

## 2024-10-16 DIAGNOSIS — C55 MALIGNANT NEOPLASM OF UTERUS, PART UNSPECIFIED (MULTI): ICD-10-CM

## 2024-10-16 LAB
RAD ONC MSQ ACTUAL FRACTIONS DELIVERED: 4
RAD ONC MSQ ACTUAL SESSION DELIVERED DOSE: 600 CGRAY
RAD ONC MSQ ACTUAL TOTAL DOSE: 2400 CGRAY
RAD ONC MSQ ELAPSED DAYS: 9
RAD ONC MSQ LAST DATE: NORMAL
RAD ONC MSQ PRESCRIBED FRACTIONAL DOSE: 600 CGRAY
RAD ONC MSQ PRESCRIBED NUMBER OF FRACTIONS: 5
RAD ONC MSQ PRESCRIBED TECHNIQUE: NORMAL
RAD ONC MSQ PRESCRIBED TOTAL DOSE: 3000 CGRAY
RAD ONC MSQ PRESCRIPTION PATTERN COMMENT: NORMAL
RAD ONC MSQ START DATE: NORMAL
RAD ONC MSQ TREATMENT COURSE NUMBER: 1
RAD ONC MSQ TREATMENT SITE: NORMAL

## 2024-10-16 PROCEDURE — 77770 HDR RDNCL NTRSTL/ICAV BRCHTX: CPT | Performed by: STUDENT IN AN ORGANIZED HEALTH CARE EDUCATION/TRAINING PROGRAM

## 2024-10-16 PROCEDURE — 57156 INS VAG BRACHYTX DEVICE: CPT | Performed by: STUDENT IN AN ORGANIZED HEALTH CARE EDUCATION/TRAINING PROGRAM

## 2024-10-16 PROCEDURE — 77280 THER RAD SIMULAJ FIELD SMPL: CPT | Performed by: STUDENT IN AN ORGANIZED HEALTH CARE EDUCATION/TRAINING PROGRAM

## 2024-10-16 PROCEDURE — C1717 BRACHYTX, NON-STR,HDR IR-192: HCPCS | Performed by: STUDENT IN AN ORGANIZED HEALTH CARE EDUCATION/TRAINING PROGRAM

## 2024-10-16 PROCEDURE — 77332 RADIATION TREATMENT AID(S): CPT | Performed by: STUDENT IN AN ORGANIZED HEALTH CARE EDUCATION/TRAINING PROGRAM

## 2024-10-16 NOTE — PROGRESS NOTES
Patient Name: Simran Burroughs  MRN: [id number]  : 1955  Age: 69 y.o.  Diagnosis:   1. Malignant neoplasm of uterus, part unspecified (Multi)  Rad Onc Msq Treatment Summary    Rad Onc Msq Treatment Summary        Date: 10/16/24     GYN HDR Nursing Flowsheet    Fraction: 4  Time: 1300  Assessment: Pt denied having any bowel, bleeding or other complaints. Pt reported having a comfortably full bladder prior to treatment today.    1310 - Escorted/transported patient from exam room after verifying patient name and birth date.  1315 - Dr. Lomax in room and applicator connected to HDR unit with difficulty.  1322 - Treatment started without difficulty.  1327 - Treatment completed without difficulty.   - Room applicator/transfer cables removed without difficulty.  - Reviewed discharge instructions with patient: to call with excessive bleeding, or discharge.   - Appointment for next treatment/follow-up on 10/21/24 at 1300.   Discharged to home/floor in apparent stable condition.    Additional Notes: Pt tolerated fraction 4 without issue.      By: Gisel Walsh RN      Electronically signed by Gisel Walsh RN on 10/16/24 at 1:35 PM

## 2024-10-18 DIAGNOSIS — E11.69 TYPE 2 DIABETES MELLITUS WITH OTHER SPECIFIED COMPLICATION, UNSPECIFIED WHETHER LONG TERM INSULIN USE (MULTI): ICD-10-CM

## 2024-10-18 PROCEDURE — RXMED WILLOW AMBULATORY MEDICATION CHARGE

## 2024-10-18 RX ORDER — DAPAGLIFLOZIN 10 MG/1
10 TABLET, FILM COATED ORAL DAILY
Qty: 30 TABLET | Refills: 5 | Status: SHIPPED | OUTPATIENT
Start: 2024-10-18 | End: 2025-10-18

## 2024-10-21 ENCOUNTER — PHARMACY VISIT (OUTPATIENT)
Dept: PHARMACY | Facility: CLINIC | Age: 69
End: 2024-10-21
Payer: COMMERCIAL

## 2024-10-21 ENCOUNTER — HOSPITAL ENCOUNTER (OUTPATIENT)
Dept: RADIATION ONCOLOGY | Facility: HOSPITAL | Age: 69
Setting detail: RADIATION/ONCOLOGY SERIES
Discharge: HOME | End: 2024-10-21
Payer: COMMERCIAL

## 2024-10-21 DIAGNOSIS — C55 MALIGNANT NEOPLASM OF UTERUS, PART UNSPECIFIED (MULTI): ICD-10-CM

## 2024-10-21 LAB
RAD ONC MSQ ACTUAL FRACTIONS DELIVERED: 5
RAD ONC MSQ ACTUAL SESSION DELIVERED DOSE: 600 CGRAY
RAD ONC MSQ ACTUAL TOTAL DOSE: 3000 CGRAY
RAD ONC MSQ ELAPSED DAYS: 14
RAD ONC MSQ LAST DATE: NORMAL
RAD ONC MSQ PRESCRIBED FRACTIONAL DOSE: 600 CGRAY
RAD ONC MSQ PRESCRIBED NUMBER OF FRACTIONS: 5
RAD ONC MSQ PRESCRIBED TECHNIQUE: NORMAL
RAD ONC MSQ PRESCRIBED TOTAL DOSE: 3000 CGRAY
RAD ONC MSQ PRESCRIPTION PATTERN COMMENT: NORMAL
RAD ONC MSQ START DATE: NORMAL
RAD ONC MSQ TREATMENT COURSE NUMBER: 1
RAD ONC MSQ TREATMENT SITE: NORMAL

## 2024-10-21 PROCEDURE — C1717 BRACHYTX, NON-STR,HDR IR-192: HCPCS | Performed by: STUDENT IN AN ORGANIZED HEALTH CARE EDUCATION/TRAINING PROGRAM

## 2024-10-21 PROCEDURE — 57156 INS VAG BRACHYTX DEVICE: CPT | Performed by: STUDENT IN AN ORGANIZED HEALTH CARE EDUCATION/TRAINING PROGRAM

## 2024-10-21 PROCEDURE — 77280 THER RAD SIMULAJ FIELD SMPL: CPT | Performed by: STUDENT IN AN ORGANIZED HEALTH CARE EDUCATION/TRAINING PROGRAM

## 2024-10-21 PROCEDURE — 77336 RADIATION PHYSICS CONSULT: CPT | Mod: 59 | Performed by: STUDENT IN AN ORGANIZED HEALTH CARE EDUCATION/TRAINING PROGRAM

## 2024-10-21 PROCEDURE — 77332 RADIATION TREATMENT AID(S): CPT | Performed by: STUDENT IN AN ORGANIZED HEALTH CARE EDUCATION/TRAINING PROGRAM

## 2024-10-21 PROCEDURE — 77770 HDR RDNCL NTRSTL/ICAV BRCHTX: CPT | Performed by: STUDENT IN AN ORGANIZED HEALTH CARE EDUCATION/TRAINING PROGRAM

## 2024-10-21 NOTE — PROGRESS NOTES
Patient Name: Simran Burroughs  MRN: [id number]  : 1955  Age: 69 y.o.  Diagnosis:   1. Malignant neoplasm of uterus, part unspecified (Multi)  Rad Onc Msq Treatment Summary    Rad Onc Msq Treatment Summary        Date: 10/21/24     GYN HDR Nursing Flowsheet    Fraction: 5  Time: 1300  Assessment: Pt arrived for fraction 5, denied having bowel, bladder, bleeding or other complaints. Pt reported having comfortably full bladder prior to treatment.    1300 - Escorted/transported patient from exam room after verifying patient name and birth date.  1305 - Dr. Lomax in room and applicator connected to HDR unit without difficulty.  1316 - Treatment started without difficulty.  1321 - Treatment completed without difficulty.   - Dr. Lomax in room applicator/transfer cables removed without difficulty.  - Reviewed discharge instructions with patient: to call with excessive bleeding, or discharge.   Appointment for follow-up on 24 with Kristen Mcfadden CNP. Reviewed what to expect at this appt including having exam and instructions for resuming sexual activity/dilator use   Discharged to home/floor in apparent stable condition.      Additional Notes: Pt tolerated fraction 5 without issue.      By: Gisel Walsh RN      Electronically signed by Gisel Walsh RN on 10/21/24 at 1:35 PM

## 2024-10-22 ENCOUNTER — DOCUMENTATION (OUTPATIENT)
Dept: RADIATION ONCOLOGY | Facility: HOSPITAL | Age: 69
End: 2024-10-22
Payer: COMMERCIAL

## 2024-10-22 NOTE — PROGRESS NOTES
Radiation Oncology Treatment Summary    Patient Name:  Simran Burroughs  MRN:  [id number]  :  1955    Radiation Oncologist: Dr. Alia Lomax  Referring Provider: Dr. Ananya Pablo  Primary Care Provider: Sarina Don PA-C    Brief History:  Simran Burroughs is a 69 y.o. female with Stage IB,pT1b, Nx, Mx endometrial carcinoma, endometroid type, 77%MMI, -LVSI, p53wt, MMRp.   She has high risk features including her age, stage IB and MMI (77%).   We reviewed the role and rationale for adjuvant radiotherapy (VBT and/or EBRT) in the management of HIR endometrial carcinoma. We discussed the benefits of VBT with regard to reducing the risk of vaginal cuff failures while not impacting pelvic recurrence and EBRT in reducing the rates of vaginal and pelvic recurrence rate. We discussed the logistics and comparative toxicities between the two treatment modalities including, but not limited to bowel, bladder and vaginal toxicity.   The patient opted to proceed with VBT. She expressed understanding and agreement with the plan.   The patient completed radiotherapy as outlined below.    Radiation Treatment Summary:    Treatment site: Vaginal cuff  Treatment dates: 10/7/24-10/21/24  Fractions: 5/5  Dose: 3000 cGy  Technique: HDR      Concurrent Chemotherapy:  N/A    CTCAE Toxicity Overview:     Patient Disposition:   Future Appointments       Date / Time Provider Department Dept Phone    10/31/2024 9:00 AM Kelli Gonzalez MD MPH [facility] [phone]    2024 9:00 AM Isela Hancock, PharmD [facility] [phone]    2024 9:30 AM Sarina Don PA-C [facility]     2024 11:00 AM Kristen Mcfadden, APRN-CNP [facility] [phone]    2024 9:00 AM Albino Gross MD [facility] [phone]    2025 1:40 PM Ananya Pablo MD [facility]

## 2024-10-29 ENCOUNTER — APPOINTMENT (OUTPATIENT)
Dept: ENDOCRINOLOGY | Facility: CLINIC | Age: 69
End: 2024-10-29
Payer: COMMERCIAL

## 2024-10-30 ENCOUNTER — PATIENT OUTREACH (OUTPATIENT)
Dept: PRIMARY CARE | Facility: CLINIC | Age: 69
End: 2024-10-30
Payer: COMMERCIAL

## 2024-10-31 ENCOUNTER — OFFICE VISIT (OUTPATIENT)
Dept: OBSTETRICS AND GYNECOLOGY | Facility: CLINIC | Age: 69
End: 2024-10-31
Payer: COMMERCIAL

## 2024-10-31 VITALS — DIASTOLIC BLOOD PRESSURE: 74 MMHG | BODY MASS INDEX: 27.21 KG/M2 | SYSTOLIC BLOOD PRESSURE: 177 MMHG | WEIGHT: 148.8 LBS

## 2024-10-31 DIAGNOSIS — K59.00 CONSTIPATION, UNSPECIFIED CONSTIPATION TYPE: Primary | ICD-10-CM

## 2024-10-31 DIAGNOSIS — N39.3 SUI (STRESS URINARY INCONTINENCE, FEMALE): ICD-10-CM

## 2024-10-31 DIAGNOSIS — R39.15 URINARY URGENCY: ICD-10-CM

## 2024-10-31 LAB
POC APPEARANCE, URINE: CLEAR
POC BILIRUBIN, URINE: NEGATIVE
POC BLOOD, URINE: ABNORMAL
POC COLOR, URINE: YELLOW
POC GLUCOSE, URINE: NEGATIVE MG/DL
POC KETONES, URINE: NEGATIVE MG/DL
POC LEUKOCYTES, URINE: NEGATIVE
POC NITRITE,URINE: NEGATIVE
POC PH, URINE: 6 PH
POC PROTEIN, URINE: NEGATIVE MG/DL
POC SPECIFIC GRAVITY, URINE: 1.02
POC UROBILINOGEN, URINE: 0.2 EU/DL

## 2024-10-31 PROCEDURE — 51798 US URINE CAPACITY MEASURE: CPT | Performed by: OBSTETRICS & GYNECOLOGY

## 2024-10-31 PROCEDURE — 1159F MED LIST DOCD IN RCRD: CPT | Performed by: OBSTETRICS & GYNECOLOGY

## 2024-10-31 PROCEDURE — 3062F POS MACROALBUMINURIA REV: CPT | Performed by: OBSTETRICS & GYNECOLOGY

## 2024-10-31 PROCEDURE — 1123F ACP DISCUSS/DSCN MKR DOCD: CPT | Performed by: OBSTETRICS & GYNECOLOGY

## 2024-10-31 PROCEDURE — 99214 OFFICE O/P EST MOD 30 MIN: CPT | Performed by: OBSTETRICS & GYNECOLOGY

## 2024-10-31 PROCEDURE — 81003 URINALYSIS AUTO W/O SCOPE: CPT | Mod: QW | Performed by: OBSTETRICS & GYNECOLOGY

## 2024-10-31 PROCEDURE — 3048F LDL-C <100 MG/DL: CPT | Performed by: OBSTETRICS & GYNECOLOGY

## 2024-10-31 PROCEDURE — 3078F DIAST BP <80 MM HG: CPT | Performed by: OBSTETRICS & GYNECOLOGY

## 2024-10-31 PROCEDURE — 3077F SYST BP >= 140 MM HG: CPT | Performed by: OBSTETRICS & GYNECOLOGY

## 2024-10-31 PROCEDURE — 4010F ACE/ARB THERAPY RXD/TAKEN: CPT | Performed by: OBSTETRICS & GYNECOLOGY

## 2024-10-31 ASSESSMENT — ENCOUNTER SYMPTOMS
NEUROLOGICAL NEGATIVE: 1
EYES NEGATIVE: 1
ENDOCRINE NEGATIVE: 1
CARDIOVASCULAR NEGATIVE: 1
PSYCHIATRIC NEGATIVE: 1
RESPIRATORY NEGATIVE: 1
CONSTIPATION: 1
MUSCULOSKELETAL NEGATIVE: 1
CONSTITUTIONAL NEGATIVE: 1

## 2024-11-05 ENCOUNTER — APPOINTMENT (OUTPATIENT)
Dept: ENDOCRINOLOGY | Facility: CLINIC | Age: 69
End: 2024-11-05
Payer: COMMERCIAL

## 2024-11-05 VITALS
DIASTOLIC BLOOD PRESSURE: 72 MMHG | WEIGHT: 147 LBS | HEART RATE: 77 BPM | SYSTOLIC BLOOD PRESSURE: 160 MMHG | BODY MASS INDEX: 27.05 KG/M2 | HEIGHT: 62 IN

## 2024-11-05 DIAGNOSIS — I10 BENIGN HYPERTENSION: ICD-10-CM

## 2024-11-05 DIAGNOSIS — Z79.4 TYPE 2 DIABETES MELLITUS WITH DIABETIC POLYNEUROPATHY, WITH LONG-TERM CURRENT USE OF INSULIN: Primary | ICD-10-CM

## 2024-11-05 DIAGNOSIS — E11.42 TYPE 2 DIABETES MELLITUS WITH DIABETIC POLYNEUROPATHY, WITH LONG-TERM CURRENT USE OF INSULIN: Primary | ICD-10-CM

## 2024-11-05 DIAGNOSIS — E78.2 HYPERLIPEMIA, MIXED: ICD-10-CM

## 2024-11-05 LAB — POC HEMOGLOBIN A1C: 8.3 % (ref 4.2–6.5)

## 2024-11-05 PROCEDURE — 95251 CONT GLUC MNTR ANALYSIS I&R: CPT | Performed by: INTERNAL MEDICINE

## 2024-11-05 PROCEDURE — 99214 OFFICE O/P EST MOD 30 MIN: CPT | Performed by: INTERNAL MEDICINE

## 2024-11-05 PROCEDURE — 83036 HEMOGLOBIN GLYCOSYLATED A1C: CPT | Performed by: INTERNAL MEDICINE

## 2024-11-05 NOTE — PROGRESS NOTES
Subjective   Patient ID: Simran Burroughs is a 69 y.o. female who presents for Diabetes.  HPI  70 yo with DM Type 2 (diagnosed in her 50s), HTN, HLD, CAD, CHF, new diagnosis uterine cancer (s/p hysterectomy and radiation). Last A1c-8.2%, today 8.3%.     Patient testing sugars 6 times day with Sulma 3 maryann. No lows. Following carb controlled diet somewhat and know reasonable carb allowances - admits diet has been worse lately, eating out more. Patient able to afford their medications. Patient is exercising.    Taking Tresiba 32 units daily, Ozempic 30 clicks weekly on a 2mg pen, and Farxiga 10mg daily.  -feeling nauseous on Ozempic - stopped x 2 weeks for surgery in August, restarted at 2mg weekly and felt very sick; held for another few weeks and restarted at 30 clicks a week, still feeling nauseous; previously tolerated 2mg weekly very well with good glycemic control.   -pt is intolerant to metformin  -no TZD d/t CHF    Taking amlodipine 10mg daily, carvedilol 12.5mg BID, and losartan 100mg daily for htn/chf.   -Managed per Dr. Gross  Taking pravastatin 20mg daily for lipids and tolerating.     Current Outpatient Medications:     amLODIPine (Norvasc) 10 mg tablet, Take 1 tablet (10 mg) by mouth once daily., Disp: 90 tablet, Rfl: 3    blood-glucose sensor (FreeStyle Sulma 3 Sensor) device, USE AS DIRECTED CHANGE SENSOR EVERY 15 DAYS, Disp: 2 each, Rfl: 11    carvedilol (Coreg) 12.5 mg tablet, Take 1 tablet (12.5 mg) by mouth 2 times a day., Disp: 180 tablet, Rfl: 3    cholecalciferol (Vitamin D-3) 125 MCG (5000 UT) capsule, Take 1 capsule (125 mcg) by mouth once daily., Disp: , Rfl:     cyanocobalamin (Vitamin B-12) 1,000 mcg tablet, Take 1 tablet (1,000 mcg) by mouth once daily., Disp: , Rfl:     dapagliflozin propanediol (Farxiga) 10 mg, TAKE 1 TABLET BY MOUTH ONE TIME DAILY, Disp: 30 tablet, Rfl: 5    escitalopram (Lexapro) 10 mg tablet, Take 1 tablet (10 mg) by mouth once daily., Disp: 90 tablet, Rfl: 1    insulin  "degludec (Tresiba FlexTouch U-100) 100 unit/mL (3 mL) injection, INJECT 64 UNITS SUBCUTANEOUSLY ONE TIME DAILY AT BEDTIME, Disp: 30 mL, Rfl: 5    losartan (Cozaar) 100 mg tablet, Take 1 tablet (100 mg) by mouth once daily., Disp: 90 tablet, Rfl: 3    pen needle, diabetic 32 gauge x 5/32\" needle, Inject 1 Pen needle under the skin 4 times a day.  use 4 pen needles per day to inject insulin, Disp: , Rfl:     polyethylene glycol (Glycolax, Miralax) 17 gram packet, Take 17 g by mouth once daily as needed (constipation)., Disp: , Rfl:     pravastatin (Pravachol) 20 mg tablet, Take 1 tablet (20 mg) by mouth once daily at bedtime., Disp: 90 tablet, Rfl: 3    semaglutide (Ozempic) 2 mg/dose (8 mg/3 mL) pen injector, INJECT 2 MG UNDER THE SKIN ONCE WEEKLY AS DIRECTED, Disp: 3 mL, Rfl: 5   Allergies   Allergen Reactions    Naproxen Sodium Other and Unknown     HYPOTENSION    Lisinopril Cough       Review of Systems  See HPI.     Objective   /72   Pulse 77   Ht 1.575 m (5' 2\")   Wt 66.7 kg (147 lb)   BMI 26.89 kg/m²     Labs:   Lab Results   Component Value Date    HGBA1C 8.3 (A) 11/05/2024     Lab Results   Component Value Date    CALCIUM 9.4 08/05/2024    AST 12 05/15/2024    ALKPHOS 143 (H) 05/15/2024    BILITOT 0.3 05/15/2024    PROT 7.2 05/15/2024    ALBUMIN 4.3 05/15/2024    GLOB 3.0 12/31/2021    AGR 1.4 (L) 12/31/2021     08/05/2024    K 4.4 08/05/2024     08/05/2024    CO2 27 08/05/2024    ANIONGAP 14 08/05/2024    BUN 24 (H) 08/05/2024    CREATININE 1.37 (H) 08/05/2024    UREACREAUR 13.1 12/31/2021    GLUCOSE 187 (H) 08/05/2024    ALT 5 05/15/2024    EGFR 42 (L) 08/05/2024     Lab Results   Component Value Date    WBC 8.2 08/05/2024    NRBC 0.0 08/05/2024    RBC 5.16 08/05/2024    HGB 14.4 08/05/2024    HCT 44.2 08/05/2024     08/05/2024     Lab Results   Component Value Date    CHOL 164 05/15/2024    TRIG 87 05/15/2024    HDL 52.0 05/15/2024    LDLCALC 95 05/15/2024     Lab Results " "  Component Value Date    CREATU 75.9 05/15/2024    MICROALBCREA 1,752.3 05/15/2024     Lab Results   Component Value Date    TSH 1.20 05/15/2024     Lab Results   Component Value Date    DRVPYCUC29 306 05/15/2024     Lab Results   Component Value Date    VITD25 24 (L) 05/15/2024     No results found for: \"PTH\"  Lab Results   Component Value Date    MG 2.15 08/14/2024       Assessment    1. Type 2 diabetes mellitus with diabetic polyneuropathy, with long-term current use of insulin    2. Hyperlipemia, mixed    3. Benign hypertension        Medical Decision Making  Complexity of problem: Chronic illness of diabetes mellitus uncontrolled, progressing  Data analyzed and reviewed: Reviewed prior notes, blood glucose data, labs including HgbA1c, lipids, serum chemistries.  Ordered tests.   Risk of complications and morbidities: Is definite because of use of insulin and risk of hypoglycemia.  Prescription medications reviewed and modifications made.  Compliance assessed.  Addressed social determinants of health including food insecurity.    Plan   1. Type 2 diabetes mellitus with diabetic polyneuropathy, with long-term current use of insulin (Primary)  - POCT glycosylated hemoglobin (Hb A1C) manually resulted    -A1c ordered and reviewed.   -CGM data downloaded and reviewed.   -Labs reviewed.     -A1c remains above target - has been off and on Ozempic, not at max dose, since August 2024.   -Likely not tolerating Ozempic as well d/t restarting higher dose after being off the Ozempic for a few weeks around surgery.   -Will hold Ozempic x 2 weeks and restart at starting dose - pt to take 0.25mg weekly x 2 weeks, then increase to 0.5mg weekly x 3 weeks to finish sample pen provided today.   -Pt then can resume 2mg pens, taking 36 clicks (1mg) weekly for 4 weeks, then increase to 2mg weekly thereafter.   -If sugars are not under better control next visit, may need to consider small dose of prandial insulin.     SAMPLE PROVIDED " - OZEMPIC 0.25/0.5MG - Lot #[id number], Exp. 02/28/2025    2. Hyperlipemia, mixed  -On statin and tolerating.   -Continue current therapy.     3. Benign hypertension  -BP elevated today.   -Pt reports BP elevated recently - under more stress with surgery and radiation for uterine cancer.   -Has follow up scheduled with Dr. Gross, cardiology.    -Continue current therapy - advised pt to contact cardio regarding high BP readings.         -labs/tests/notes reviewed  -reviewed and counseled patient on medication monitoring and side effects    Follow Up: 4 months Dr. Samson     Treatment and plan discussed with Dr. Merritt Samson.   JOSÉ Hancock, PharmD, BCACP, CDCES.

## 2024-11-05 NOTE — PROGRESS NOTES
Attestation signed by Merritt Samson MD on 11/5/24 at 10:58 AM.    I, Dr Merritt Samson, have reviewed this progress note, medication list, vital signs, any pertinent lab values, and any CGM data if present with the [facility] and  face to face during this visit today. This note reflects the treatment plan that was made under my direction after reviewing the above mentioned elements while face to face with the patient and CDE.  I personally answered and addressed any questions and concerns the patient had during the visit today.  The CDE entered the data in this note under my direction and I personally reviewed it, signed any lab or medication orders that I instructed to be completed. I am the billing provider for this visit and the level of service was determined by my involvement in the Medical Decision Making Component of this visit while face to face with the patient.

## 2024-11-05 NOTE — PATIENT INSTRUCTIONS
Your A1c was 8.3% today.     HOLD your Ozempic for 2 weeks.     After those two weeks, restart Ozempic using the sample pen provided today:  -Take 0.25mg once a week for 2 weeks.   -Then increase to 0.5mg once a week until you finish the sample pen.     Once you finish the sample pen, go back to the Ozempic 2mg pen:   -Take 36 clicks (1mg) once a week for 4 weeks.  -Then increase to 2mg once a week thereafter.     Follow up with Dr. Gross about your blood pressure.     Follow up with Dr. Samson in 4 months.

## 2024-11-14 ENCOUNTER — OFFICE VISIT (OUTPATIENT)
Dept: PRIMARY CARE | Facility: CLINIC | Age: 69
End: 2024-11-14
Payer: COMMERCIAL

## 2024-11-14 VITALS
HEIGHT: 62 IN | SYSTOLIC BLOOD PRESSURE: 148 MMHG | HEART RATE: 77 BPM | DIASTOLIC BLOOD PRESSURE: 90 MMHG | OXYGEN SATURATION: 99 % | BODY MASS INDEX: 26.87 KG/M2 | WEIGHT: 146 LBS

## 2024-11-14 DIAGNOSIS — E55.9 VITAMIN D DEFICIENCY: ICD-10-CM

## 2024-11-14 DIAGNOSIS — Z90.710 S/P HYSTERECTOMY WITH OOPHORECTOMY: ICD-10-CM

## 2024-11-14 DIAGNOSIS — R53.83 FATIGUE, UNSPECIFIED TYPE: ICD-10-CM

## 2024-11-14 DIAGNOSIS — Z13.820 SCREENING FOR OSTEOPOROSIS: ICD-10-CM

## 2024-11-14 DIAGNOSIS — Z12.31 SCREENING MAMMOGRAM FOR BREAST CANCER: ICD-10-CM

## 2024-11-14 DIAGNOSIS — F43.23 ADJUSTMENT REACTION WITH ANXIETY AND DEPRESSION: Primary | ICD-10-CM

## 2024-11-14 DIAGNOSIS — Z78.0 MENOPAUSE: ICD-10-CM

## 2024-11-14 DIAGNOSIS — Z90.721 S/P HYSTERECTOMY WITH OOPHORECTOMY: ICD-10-CM

## 2024-11-14 DIAGNOSIS — I10 ESSENTIAL HYPERTENSION, BENIGN: ICD-10-CM

## 2024-11-14 PROBLEM — E53.8 COBALAMIN DEFICIENCY: Status: RESOLVED | Noted: 2021-11-11 | Resolved: 2024-11-14

## 2024-11-14 PROBLEM — M25.529 ELBOW PAIN: Status: RESOLVED | Noted: 2019-11-26 | Resolved: 2024-11-14

## 2024-11-14 PROBLEM — I51.89 IMPAIRED CARDIAC FUNCTION: Status: RESOLVED | Noted: 2023-08-25 | Resolved: 2024-11-14

## 2024-11-14 PROBLEM — M25.511 RIGHT SHOULDER PAIN: Status: RESOLVED | Noted: 2019-11-26 | Resolved: 2024-11-14

## 2024-11-14 PROBLEM — C54.1 MALIGNANT NEOPLASM OF ENDOMETRIUM (MULTI): Status: RESOLVED | Noted: 2024-09-24 | Resolved: 2024-11-14

## 2024-11-14 PROBLEM — E11.65 UNCONTROLLED TYPE 2 DIABETES MELLITUS WITH HYPERGLYCEMIA: Status: RESOLVED | Noted: 2017-11-15 | Resolved: 2024-11-14

## 2024-11-14 PROBLEM — F43.21 SITUATIONAL DEPRESSION: Status: RESOLVED | Noted: 2022-05-26 | Resolved: 2024-11-14

## 2024-11-14 PROBLEM — R42 INTERMITTENT VERTIGO: Status: RESOLVED | Noted: 2020-09-21 | Resolved: 2024-11-14

## 2024-11-14 PROBLEM — F43.22 ADJUSTMENT DISORDER WITH ANXIOUS MOOD: Status: RESOLVED | Noted: 2017-11-15 | Resolved: 2024-11-14

## 2024-11-14 PROBLEM — S99.922A UNSPECIFIED INJURY OF LEFT FOOT, INITIAL ENCOUNTER: Status: RESOLVED | Noted: 2023-05-27 | Resolved: 2024-11-14

## 2024-11-14 PROBLEM — F33.9 RECURRENT DEPRESSIVE DISORDER (CMS-HCC): Status: RESOLVED | Noted: 2019-11-26 | Resolved: 2024-11-14

## 2024-11-14 PROBLEM — E83.42 HYPOMAGNESEMIA: Status: RESOLVED | Noted: 2021-11-11 | Resolved: 2024-11-14

## 2024-11-14 PROBLEM — Z01.810 PREOP CARDIOVASCULAR EXAM: Status: RESOLVED | Noted: 2024-07-29 | Resolved: 2024-11-14

## 2024-11-14 PROBLEM — M79.602 LEFT ARM PAIN: Status: RESOLVED | Noted: 2024-02-29 | Resolved: 2024-11-14

## 2024-11-14 PROBLEM — M25.522 LEFT ELBOW PAIN: Status: RESOLVED | Noted: 2023-10-31 | Resolved: 2024-11-14

## 2024-11-14 PROBLEM — I50.9 HEART FAILURE: Status: RESOLVED | Noted: 2023-08-25 | Resolved: 2024-11-14

## 2024-11-14 PROBLEM — M54.2 NECK PAIN: Status: RESOLVED | Noted: 2019-11-26 | Resolved: 2024-11-14

## 2024-11-14 PROBLEM — R73.09 BLOOD GLUCOSE ABNORMAL: Status: RESOLVED | Noted: 2023-08-25 | Resolved: 2024-11-14

## 2024-11-14 PROBLEM — R06.83 SNORING: Status: RESOLVED | Noted: 2021-11-11 | Resolved: 2024-11-14

## 2024-11-14 PROBLEM — F41.9 ANXIETY: Status: RESOLVED | Noted: 2021-11-11 | Resolved: 2024-11-14

## 2024-11-14 PROBLEM — S42.292D CLOSED 3-PART FRACTURE OF PROXIMAL HUMERUS WITH ROUTINE HEALING, LEFT: Status: RESOLVED | Noted: 2023-10-31 | Resolved: 2024-11-14

## 2024-11-14 PROBLEM — Z86.79 HISTORY OF HYPERTENSION: Status: RESOLVED | Noted: 2024-02-12 | Resolved: 2024-11-14

## 2024-11-14 PROBLEM — S30.3XXA CONTUSION OF ANUS: Status: RESOLVED | Noted: 2024-02-12 | Resolved: 2024-11-14

## 2024-11-14 PROBLEM — R07.81 RIB PAIN: Status: RESOLVED | Noted: 2023-08-25 | Resolved: 2024-11-14

## 2024-11-14 PROBLEM — E11.9 DIABETES MELLITUS TYPE 2 IN NONOBESE (MULTI): Status: RESOLVED | Noted: 2023-08-25 | Resolved: 2024-11-14

## 2024-11-14 PROBLEM — Z95.1 PRESENCE OF AORTOCORONARY BYPASS GRAFT: Status: RESOLVED | Noted: 2023-05-27 | Resolved: 2024-11-14

## 2024-11-14 PROBLEM — F43.22 ADJUSTMENT REACTION WITH ANXIETY: Status: RESOLVED | Noted: 2020-09-21 | Resolved: 2024-11-14

## 2024-11-14 PROBLEM — N85.8 UTERINE MASS: Status: RESOLVED | Noted: 2024-07-11 | Resolved: 2024-11-14

## 2024-11-14 PROBLEM — I49.9 CARDIAC ARRHYTHMIA: Status: RESOLVED | Noted: 2023-08-25 | Resolved: 2024-11-14

## 2024-11-14 PROBLEM — M25.569 KNEE PAIN: Status: RESOLVED | Noted: 2023-08-25 | Resolved: 2024-11-14

## 2024-11-14 PROBLEM — C55 UTERINE CANCER (MULTI): Status: RESOLVED | Noted: 2024-08-15 | Resolved: 2024-11-14

## 2024-11-14 PROBLEM — E11.65 HYPERGLYCEMIA DUE TO TYPE 2 DIABETES MELLITUS (MULTI): Status: RESOLVED | Noted: 2023-08-25 | Resolved: 2024-11-14

## 2024-11-14 PROBLEM — S91.202A: Status: RESOLVED | Noted: 2023-05-27 | Resolved: 2024-11-14

## 2024-11-14 PROBLEM — E78.2 HYPERLIPIDEMIA, MIXED: Status: RESOLVED | Noted: 2017-11-15 | Resolved: 2024-11-14

## 2024-11-14 PROBLEM — N95.0 POSTMENOPAUSAL BLEEDING: Status: RESOLVED | Noted: 2022-05-26 | Resolved: 2024-11-14

## 2024-11-14 PROBLEM — S89.90XA INJURY OF KNEE: Status: RESOLVED | Noted: 2023-08-25 | Resolved: 2024-11-14

## 2024-11-14 PROCEDURE — 3080F DIAST BP >= 90 MM HG: CPT

## 2024-11-14 PROCEDURE — 3048F LDL-C <100 MG/DL: CPT

## 2024-11-14 PROCEDURE — 90471 IMMUNIZATION ADMIN: CPT

## 2024-11-14 PROCEDURE — 3062F POS MACROALBUMINURIA REV: CPT

## 2024-11-14 PROCEDURE — 1126F AMNT PAIN NOTED NONE PRSNT: CPT

## 2024-11-14 PROCEDURE — 1123F ACP DISCUSS/DSCN MKR DOCD: CPT

## 2024-11-14 PROCEDURE — 4010F ACE/ARB THERAPY RXD/TAKEN: CPT

## 2024-11-14 PROCEDURE — 1159F MED LIST DOCD IN RCRD: CPT

## 2024-11-14 PROCEDURE — 99214 OFFICE O/P EST MOD 30 MIN: CPT

## 2024-11-14 PROCEDURE — 3008F BODY MASS INDEX DOCD: CPT

## 2024-11-14 PROCEDURE — 1036F TOBACCO NON-USER: CPT

## 2024-11-14 PROCEDURE — 90662 IIV NO PRSV INCREASED AG IM: CPT

## 2024-11-14 PROCEDURE — 3077F SYST BP >= 140 MM HG: CPT

## 2024-11-14 ASSESSMENT — ENCOUNTER SYMPTOMS
SHORTNESS OF BREATH: 0
LIGHT-HEADEDNESS: 0
FATIGUE: 1
PALPITATIONS: 0
DIZZINESS: 0

## 2024-11-14 ASSESSMENT — PAIN SCALES - GENERAL: PAINLEVEL_OUTOF10: 0-NO PAIN

## 2024-11-14 NOTE — PROGRESS NOTES
"Subjective   Patient ID: Simran Burroughs is a 69 y.o. female who presents for Follow-up.    Hx IDDM, CAD, ASCVD, HLD, HTN, s/p hysterectomy (uterine cancer)    Other providers: Previous PCP Dr. Hansen, Dr. Gross (cardiology), Margi Laughlin CNP (endocrinologist), Dr. Angel Spring (ophthalmologist)    All medications besides Lexapro prescribed through specialist.     Depression with anxiety: controlled 10 mg Lexapro. Has been on for years. Denies SI.     Patient had hysterectomy and radiation for uterine cancer found 6 months ago.     HTN: managed by cardiologist. Elevated today. On 100 mg Losartan, 10 mg amlodipine, 12.5 mg BID Carvidolol. Since has appointment nest month with her cardiologist will hold off at this time on making medications adjustments. Denies chest pain, heart palpitations, SOB, dizziness, headaches, changes of vision, syncope, leg swelling.                 Review of Systems   Constitutional:  Positive for fatigue.   Eyes:  Negative for visual disturbance.   Respiratory:  Negative for shortness of breath.    Cardiovascular:  Negative for chest pain, palpitations and leg swelling.   Neurological:  Negative for dizziness, syncope and light-headedness.       Objective   /90   Pulse 77   Ht 1.575 m (5' 2\")   Wt 66.2 kg (146 lb)   SpO2 99%   BMI 26.70 kg/m²     Physical Exam  Constitutional:       General: She is not in acute distress.     Appearance: Normal appearance.   Cardiovascular:      Rate and Rhythm: Normal rate and regular rhythm.      Heart sounds: Murmur heard.   Pulmonary:      Effort: Pulmonary effort is normal.      Breath sounds: Normal breath sounds. No wheezing, rhonchi or rales.   Musculoskeletal:      Right lower leg: No edema.      Left lower leg: No edema.   Skin:     General: Skin is warm and dry.      Findings: No rash.   Neurological:      Mental Status: She is alert.   Psychiatric:         Mood and Affect: Mood and affect normal.         Assessment/Plan "   Diagnoses and all orders for this visit:  Adjustment reaction with anxiety and depression  S/P hysterectomy with oophorectomy  Screening mammogram for breast cancer  -     BI mammo bilateral screening tomosynthesis; Future  Menopause  -     XR DEXA bone density; Future  Screening for osteoporosis  -     XR DEXA bone density; Future  Vitamin D deficiency  -     Vitamin D 25-Hydroxy,Total (for eval of Vitamin D levels); Future  Fatigue, unspecified type  -     Vitamin B12; Future  -     Tsh With Reflex To Free T4 If Abnormal; Future  -     CBC and Auto Differential; Future  Essential hypertension, benign  -     Comprehensive metabolic panel; Future  Other orders  -     Flu vaccine, trivalent, preservative free, HIGH-DOSE, age 65y+ (Fluzone)  Follow-up cardiologist for uncontrolled BP, obtain labs. Follow-up 6 months for Medicare physical

## 2024-11-15 ENCOUNTER — PHARMACY VISIT (OUTPATIENT)
Dept: PHARMACY | Facility: CLINIC | Age: 69
End: 2024-11-15
Payer: COMMERCIAL

## 2024-11-15 DIAGNOSIS — Z79.4 TYPE 2 DIABETES MELLITUS WITH BOTH EYES AFFECTED BY MILD NONPROLIFERATIVE RETINOPATHY WITHOUT MACULAR EDEMA, WITH LONG-TERM CURRENT USE OF INSULIN: ICD-10-CM

## 2024-11-15 DIAGNOSIS — E11.3293 TYPE 2 DIABETES MELLITUS WITH BOTH EYES AFFECTED BY MILD NONPROLIFERATIVE RETINOPATHY WITHOUT MACULAR EDEMA, WITH LONG-TERM CURRENT USE OF INSULIN: ICD-10-CM

## 2024-11-15 PROCEDURE — RXMED WILLOW AMBULATORY MEDICATION CHARGE

## 2024-11-15 RX ORDER — INSULIN DEGLUDEC 100 U/ML
INJECTION, SOLUTION SUBCUTANEOUS
Qty: 30 ML | Refills: 5 | Status: SHIPPED | OUTPATIENT
Start: 2024-11-15

## 2024-11-18 ENCOUNTER — HOSPITAL ENCOUNTER (OUTPATIENT)
Dept: RADIATION ONCOLOGY | Facility: HOSPITAL | Age: 69
Setting detail: RADIATION/ONCOLOGY SERIES
Discharge: HOME | End: 2024-11-18
Payer: COMMERCIAL

## 2024-11-18 VITALS
BODY MASS INDEX: 27.26 KG/M2 | HEART RATE: 96 BPM | DIASTOLIC BLOOD PRESSURE: 75 MMHG | OXYGEN SATURATION: 95 % | TEMPERATURE: 97 F | WEIGHT: 149.03 LBS | RESPIRATION RATE: 18 BRPM | SYSTOLIC BLOOD PRESSURE: 176 MMHG

## 2024-11-18 DIAGNOSIS — C54.1 MALIGNANT NEOPLASM OF ENDOMETRIUM (MULTI): Primary | ICD-10-CM

## 2024-11-18 PROCEDURE — 99214 OFFICE O/P EST MOD 30 MIN: CPT | Performed by: NURSE PRACTITIONER

## 2024-11-18 ASSESSMENT — ENCOUNTER SYMPTOMS
ABDOMINAL DISTENTION: 0
ABDOMINAL PAIN: 0
LOSS OF SENSATION IN FEET: 0
ACTIVITY CHANGE: 0
DEPRESSION: 0
APPETITE CHANGE: 0
DYSURIA: 0
FLANK PAIN: 0
UNEXPECTED WEIGHT CHANGE: 0
RESPIRATORY NEGATIVE: 1
HEMATOLOGIC/LYMPHATIC NEGATIVE: 1
BLOOD IN STOOL: 0
FATIGUE: 1
CONSTIPATION: 1
HEMATURIA: 0
CARDIOVASCULAR NEGATIVE: 1
DIFFICULTY URINATING: 0
PSYCHIATRIC NEGATIVE: 1
DIAPHORESIS: 0
VOMITING: 0
DIARRHEA: 0
CHILLS: 0
MUSCULOSKELETAL NEGATIVE: 1
ANAL BLEEDING: 0
OCCASIONAL FEELINGS OF UNSTEADINESS: 0
FREQUENCY: 0
NAUSEA: 0
RECTAL PAIN: 0
NEUROLOGICAL NEGATIVE: 1
FEVER: 0

## 2024-11-18 ASSESSMENT — COLUMBIA-SUICIDE SEVERITY RATING SCALE - C-SSRS
2. HAVE YOU ACTUALLY HAD ANY THOUGHTS OF KILLING YOURSELF?: NO
1. IN THE PAST MONTH, HAVE YOU WISHED YOU WERE DEAD OR WISHED YOU COULD GO TO SLEEP AND NOT WAKE UP?: NO
6. HAVE YOU EVER DONE ANYTHING, STARTED TO DO ANYTHING, OR PREPARED TO DO ANYTHING TO END YOUR LIFE?: NO

## 2024-11-18 ASSESSMENT — PAIN SCALES - GENERAL: PAINLEVEL_OUTOF10: 0-NO PAIN

## 2024-11-18 NOTE — PROGRESS NOTES
Patient ID: [id number]     Simran Burroughs is a 69 y.o. female with Stage IB,pT1b, Nx, Mx endometrial carcinoma, endometroid type, 77%MMI, -LVSI, p53wt, MMRp.   She has high risk features including her age, stage IB and MMI (77%).   We reviewed the role and rationale for adjuvant radiotherapy (VBT and/or EBRT) in the management of HIR endometrial carcinoma. We discussed the benefits of VBT with regard to reducing the risk of vaginal cuff failures while not impacting pelvic recurrence and EBRT in reducing the rates of vaginal and pelvic recurrence rate. We discussed the logistics and comparative toxicities between the two treatment modalities including, but not limited to bowel, bladder and vaginal toxicity.   The patient opted to proceed with VBT. She expressed understanding and agreement with the plan.   The patient completed radiotherapy as outlined below.     Radiation Treatment Summary:    Treatment site: Vaginal cuff  Treatment dates: 10/7/24-10/21/24  Fractions: 5/5  Dose: 3000 cGy  Technique: HDR        Concurrent Chemotherapy:  N/A     CANCER HISTORY:  -04/2024: presented with 1 year vaginal spotting  -05/15/2024 TVUS; Large 6.1 cm complex cystic and frond-like solid mass involving the majority of the uterus  -06/05/2024 MRI pelvis; Infiltrative polypoidal endometrial soft tissue mass measuring 3.8 x 2.9 cm with a evidence of surrounding foci of myometrial wall invasion   -07/11/2024 endometrial biopsy; low grade endometrioid type endometiral carcinoma   -08/11/2024 DALIA,BSO,SLND; endometrial adenocarcinoma, endometroid type, pT1b Nx , MMR p, P53wt, FIGO Stage IB, Grade I    History of presenting illness    Simran Burroughs is a 69 y.o. female who presents today for follow up one month s/p HDR to the vaginal cuff. Patient endorses a little fatigue otherwise feels good. Appetite is good. Weight stable. Denies pelvic pain, vaginal bleeding or discharge. States it is a little itchy. Denies dysuria or hematuria.  Endorses leakage. She has been working with UroGyn. Patient has constipation at baseline. Takes benefiber daily. No rectal bleeding. She is not currently sexually active. She has not received the vaginal dilators yet. Follow up with gyn onc in January.     Review of systems:  Review of Systems   Constitutional:  Positive for fatigue. Negative for activity change, appetite change, chills, diaphoresis, fever and unexpected weight change.   Respiratory: Negative.     Cardiovascular: Negative.    Gastrointestinal:  Positive for constipation. Negative for abdominal distention, abdominal pain, anal bleeding, blood in stool, diarrhea, nausea, rectal pain and vomiting.   Genitourinary:  Positive for enuresis. Negative for decreased urine volume, difficulty urinating, dysuria, flank pain, frequency, hematuria, menstrual problem, pelvic pain, urgency, vaginal bleeding, vaginal discharge and vaginal pain.   Musculoskeletal: Negative.    Skin: Negative.    Neurological: Negative.    Hematological: Negative.    Psychiatric/Behavioral: Negative.         Past Medical history  She  has a past surgical history that includes Other surgical history (12/13/2021) and Coronary artery bypass graft (2014).      Last recorded vital:  /75   Pulse 96   Temp 36.1 °C (97 °F) (Temporal)   Resp 18   Wt 67.6 kg (149 lb 0.5 oz)   SpO2 95%   BMI 27.26 kg/m²     Physical exam  Physical Exam  Constitutional:       General: She is not in acute distress.     Appearance: Normal appearance. She is not ill-appearing, toxic-appearing or diaphoretic.   HENT:      Head: Normocephalic.   Cardiovascular:      Rate and Rhythm: Normal rate and regular rhythm.      Pulses: Normal pulses.      Heart sounds: Normal heart sounds.   Pulmonary:      Effort: Pulmonary effort is normal.      Breath sounds: Normal breath sounds.   Abdominal:      General: Bowel sounds are normal. There is no distension.      Palpations: Abdomen is soft. There is no mass.       Tenderness: There is no abdominal tenderness. There is no guarding or rebound.      Hernia: No hernia is present.   Genitourinary:     General: Normal vulva.      Pubic Area: No rash.       Labia:         Right: No rash, tenderness, lesion or injury.         Left: No rash, tenderness, lesion or injury.       Urethra: No prolapse, urethral pain, urethral swelling or urethral lesion.      Vagina: No vaginal discharge.      Comments: Normal external genitalia. Smooth vaginal canal. RT changes and sutures noted at vaginal cuff. No evidence of masses or lesions. No tenderness on exam. No abnormal vaginal bleeding or discharge.   Musculoskeletal:         General: Normal range of motion.      Cervical back: Normal range of motion and neck supple.   Lymphadenopathy:      Lower Body: No right inguinal adenopathy. No left inguinal adenopathy.   Skin:     General: Skin is warm and dry.   Neurological:      General: No focal deficit present.      Mental Status: She is alert and oriented to person, place, and time.   Psychiatric:         Mood and Affect: Mood normal.         Behavior: Behavior normal.         Thought Content: Thought content normal.         Judgment: Judgment normal.       Plan:  Assessment/Plan     69 year old female one month s/p HDR to the vaginal cuff. Patient is doing well with no acute complaints related to treatment. POLO on exam. Discussed treatment, side effects, follow up and when to call. Can try Replens for itching. Continue follow up with UroGyn for leakage. Discussed vaginal dilators and their importance. Instructed on how to use. Patient to begin using 3x per week for 5-10 min at a time. Handouts and set of dilators given. Continue follow up with Gyn onc as scheduled. Patient to return to clinic in 6 months unless needs to be seen sooner. Instructed to call with any questions or concerns.

## 2024-12-01 NOTE — PROGRESS NOTES
Subjective      Chief Complaint   Patient presents with    Follow-up          She has a history of coronary artery disease and in 2014 she had open heart surgery with LIMA to LAD NAHUM off the LIMA to the obtuse marginal branch saphenous vein graft to diagonal saphenous vein graft to the posterior descending artery and posterolateral branch.  An echocardiogram was done in 2017 showing EF of 40 to 44%.  She does have a history of hypertension hypercholesterolemia.   She is being treated for stage Ib endometrial carcinoma  The uterin cancer is doing well and is all clear now  She is not complaining of chest discomfort.  No complaints of PND or orthopnea.  The legs are not swelling on her.  NO palpitaions.  The BP is high and has not been taking the losartan.  The chol is doing well as of last May           Review of Systems   Constitutional: Negative. Negative for chills and fever.   HENT: Negative.     Eyes: Negative.    Respiratory: Negative.  Negative for cough.    Endocrine: Negative.    Skin: Negative.    Musculoskeletal: Negative.  Negative for falls.   Gastrointestinal: Negative.    Genitourinary: Negative.    Neurological: Negative.    All other systems reviewed and are negative.       Past Surgical History:   Procedure Laterality Date    CORONARY ARTERY BYPASS GRAFT  2014    HYSTERECTOMY      OTHER SURGICAL HISTORY  12/13/2021    Heart surgery        Active Ambulatory Problems     Diagnosis Date Noted    Atherosclerosis of coronary artery without angina pectoris 04/15/2014    Essential hypertension, benign 11/20/2012    Charcot's arthropathy associated with type 2 diabetes mellitus (Multi) 08/25/2023    Stage 2 chronic kidney disease 08/25/2023    Type 2 diabetes mellitus with diabetic nephropathy 08/25/2023    Type 2 diabetes mellitus with mild nonproliferative diabetic retinopathy without macular edema, bilateral 08/25/2023    Hyperlipemia, mixed 08/25/2023    Mixed conductive and sensorineural hearing loss  of both ears 08/25/2023    S/P coronary artery bypass graft x 3 08/25/2023    Tinnitus of both ears 08/25/2023    Vitamin D deficiency 11/11/2021    Polyneuropathy 05/27/2023    S/P hysterectomy with oophorectomy 09/03/2024    Adjustment reaction with anxiety and depression 11/14/2024     Resolved Ambulatory Problems     Diagnosis Date Noted    Impaired cardiac function 08/25/2023    Cardiac arrhythmia 08/25/2023    Knee pain 08/25/2023    Adjustment reaction with anxiety 09/21/2020    Adjustment disorder with anxious mood 11/15/2017    Anxiety 11/11/2021    Blood glucose abnormal 08/25/2023    Recurrent depressive disorder (CMS-HCC) 11/26/2019    Situational depression 05/26/2022    Diabetes mellitus type 2 in nonobese (Multi) 08/25/2023    Heart failure 08/25/2023    Hyperglycemia due to type 2 diabetes mellitus (Multi) 08/25/2023    Hyperlipidemia, mixed 11/15/2017    Injury of knee 08/25/2023    Unspecified injury of left foot, initial encounter 05/27/2023    Intermittent vertigo 09/21/2020    Lab test positive for detection of COVID-19 virus 11/23/2020    Hypomagnesemia 11/11/2021    Neck pain 11/26/2019    Postmenopausal bleeding 05/26/2022    Rib pain 08/25/2023    Right shoulder pain 11/26/2019    Snoring 11/11/2021    Cobalamin deficiency 11/11/2021    Left elbow pain 10/31/2023    Elbow pain 11/26/2019    Closed 3-part fracture of proximal humerus with routine healing, left 10/31/2023    Contusion of anus 02/12/2024    Displacement of cervical intervertebral disc without myelopathy 11/05/2004    Fatigue 02/12/2024    History of hypertension 02/12/2024    Brachial neuritis or radiculitis 11/05/2004    Attention deficit hyperactivity disorder (ADHD) 11/20/2012    Presence of aortocoronary bypass graft 05/27/2023    Uncontrolled type 2 diabetes mellitus with hyperglycemia 11/15/2017    Unspecified open wound of left great toe with damage to nail, initial encounter 05/27/2023    Left arm pain 02/29/2024     "Uterine mass 07/11/2024    Preop cardiovascular exam 07/29/2024    Uterine cancer (Multi) 08/15/2024    Malignant neoplasm of endometrium (Multi) 09/24/2024     Past Medical History:   Diagnosis Date    Arthritis     Coronary artery disease     Fracture, humerus     Hyperlipidemia     Hypertension     Left shoulder pain     Myocardial infarction (Multi)     Other specified diabetes mellitus without complications     Personal history of other diseases of the circulatory system     Type 2 diabetes mellitus         Visit Vitals  /70   Pulse 73   Wt 67.6 kg (149 lb)   SpO2 98%   BMI 27.25 kg/m²   OB Status Hysterectomy   Smoking Status Never   BSA 1.72 m²        Objective     Constitutional:       Appearance: Healthy appearance.   Neck:      Vascular: No JVR.   Pulmonary:      Effort: Pulmonary effort is normal.      Breath sounds: Normal breath sounds.   Cardiovascular:      PMI at left midclavicular line. Normal rate. Regular rhythm. Normal S1. Normal S2.       Murmurs: There is a grade 1/6 midsystolic murmur.      No gallop.  No click. No rub.   Pulses:     Intact distal pulses.   Abdominal:      Palpations: Abdomen is soft.   Musculoskeletal: Normal range of motion. Skin:     General: Skin is warm and dry.   Neurological:      General: No focal deficit present.            Lab Review:         Lab Results   Component Value Date    CHOL 164 05/15/2024    CHOL 167 09/23/2022    CHOL 147 08/03/2021     Lab Results   Component Value Date    HDL 52.0 05/15/2024    HDL 37 (L) 09/23/2022    HDL 36 (L) 08/03/2021     Lab Results   Component Value Date    LDLCALC 95 05/15/2024    LDLCALC 93 09/23/2022    LDLCALC 77 08/03/2021     Lab Results   Component Value Date    TRIG 87 05/15/2024    TRIG 186 (H) 09/23/2022    TRIG 170 (H) 08/03/2021     No components found for: \"CHOLHDL\"     Assessment/Plan     Atherosclerosis of coronary artery without angina pectoris  Is doing well and no angina and no chf.  She is active.  Has " had uterin cancer and is better now    Essential hypertension, benign  The BP is elevated and will start on hydrochlorothiazide.  Will have her come in and check the BP in 2 weeks    Hyperlipemia, mixed  Is controlled

## 2024-12-02 ENCOUNTER — OFFICE VISIT (OUTPATIENT)
Dept: CARDIOLOGY | Facility: CLINIC | Age: 69
End: 2024-12-02
Payer: COMMERCIAL

## 2024-12-02 VITALS
DIASTOLIC BLOOD PRESSURE: 70 MMHG | SYSTOLIC BLOOD PRESSURE: 158 MMHG | WEIGHT: 149 LBS | OXYGEN SATURATION: 98 % | BODY MASS INDEX: 27.25 KG/M2 | HEART RATE: 73 BPM

## 2024-12-02 DIAGNOSIS — I10 BENIGN HYPERTENSION: ICD-10-CM

## 2024-12-02 DIAGNOSIS — E78.2 HYPERLIPEMIA, MIXED: ICD-10-CM

## 2024-12-02 DIAGNOSIS — I25.10 ATHEROSCLEROSIS OF CORONARY ARTERY OF NATIVE HEART WITHOUT ANGINA PECTORIS, UNSPECIFIED VESSEL OR LESION TYPE: Primary | ICD-10-CM

## 2024-12-02 DIAGNOSIS — I10 ESSENTIAL HYPERTENSION, BENIGN: ICD-10-CM

## 2024-12-02 PROCEDURE — 1123F ACP DISCUSS/DSCN MKR DOCD: CPT | Performed by: INTERNAL MEDICINE

## 2024-12-02 PROCEDURE — 1036F TOBACCO NON-USER: CPT | Performed by: INTERNAL MEDICINE

## 2024-12-02 PROCEDURE — 3077F SYST BP >= 140 MM HG: CPT | Performed by: INTERNAL MEDICINE

## 2024-12-02 PROCEDURE — 3078F DIAST BP <80 MM HG: CPT | Performed by: INTERNAL MEDICINE

## 2024-12-02 PROCEDURE — 1159F MED LIST DOCD IN RCRD: CPT | Performed by: INTERNAL MEDICINE

## 2024-12-02 PROCEDURE — 99213 OFFICE O/P EST LOW 20 MIN: CPT | Performed by: INTERNAL MEDICINE

## 2024-12-02 PROCEDURE — 3062F POS MACROALBUMINURIA REV: CPT | Performed by: INTERNAL MEDICINE

## 2024-12-02 PROCEDURE — 1126F AMNT PAIN NOTED NONE PRSNT: CPT | Performed by: INTERNAL MEDICINE

## 2024-12-02 PROCEDURE — 3048F LDL-C <100 MG/DL: CPT | Performed by: INTERNAL MEDICINE

## 2024-12-02 RX ORDER — HYDROCHLOROTHIAZIDE 25 MG/1
25 TABLET ORAL DAILY
Qty: 90 TABLET | Refills: 3 | Status: SHIPPED | OUTPATIENT
Start: 2024-12-02 | End: 2025-12-02

## 2024-12-02 ASSESSMENT — PAIN SCALES - GENERAL: PAINLEVEL_OUTOF10: 0-NO PAIN

## 2024-12-02 ASSESSMENT — PATIENT HEALTH QUESTIONNAIRE - PHQ9
SUM OF ALL RESPONSES TO PHQ9 QUESTIONS 1 AND 2: 0
1. LITTLE INTEREST OR PLEASURE IN DOING THINGS: NOT AT ALL
2. FEELING DOWN, DEPRESSED OR HOPELESS: NOT AT ALL

## 2024-12-02 ASSESSMENT — ENCOUNTER SYMPTOMS
RESPIRATORY NEGATIVE: 1
OCCASIONAL FEELINGS OF UNSTEADINESS: 0
CHILLS: 0
EYES NEGATIVE: 1
MUSCULOSKELETAL NEGATIVE: 1
LOSS OF SENSATION IN FEET: 1
NEUROLOGICAL NEGATIVE: 1
DEPRESSION: 0
FEVER: 0
COUGH: 0
GASTROINTESTINAL NEGATIVE: 1
ENDOCRINE NEGATIVE: 1
FALLS: 0
CONSTITUTIONAL NEGATIVE: 1

## 2024-12-02 NOTE — ASSESSMENT & PLAN NOTE
The BP is elevated and will start on hydrochlorothiazide.  Will have her come in and check the BP in 2 weeks

## 2024-12-04 DIAGNOSIS — I50.22 CHRONIC SYSTOLIC CONGESTIVE HEART FAILURE: ICD-10-CM

## 2024-12-05 RX ORDER — CARVEDILOL 12.5 MG/1
TABLET ORAL
Qty: 180 TABLET | Refills: 3 | Status: SHIPPED | OUTPATIENT
Start: 2024-12-05

## 2025-01-03 PROCEDURE — RXMED WILLOW AMBULATORY MEDICATION CHARGE

## 2025-01-06 ENCOUNTER — PHARMACY VISIT (OUTPATIENT)
Dept: PHARMACY | Facility: CLINIC | Age: 70
End: 2025-01-06
Payer: COMMERCIAL

## 2025-01-20 ENCOUNTER — LAB (OUTPATIENT)
Dept: LAB | Facility: LAB | Age: 70
End: 2025-01-20
Payer: COMMERCIAL

## 2025-01-20 ENCOUNTER — HOSPITAL ENCOUNTER (OUTPATIENT)
Dept: RADIOLOGY | Facility: HOSPITAL | Age: 70
Discharge: HOME | End: 2025-01-20
Payer: COMMERCIAL

## 2025-01-20 DIAGNOSIS — Z78.0 MENOPAUSE: ICD-10-CM

## 2025-01-20 DIAGNOSIS — R53.83 FATIGUE, UNSPECIFIED TYPE: ICD-10-CM

## 2025-01-20 DIAGNOSIS — Z13.820 SCREENING FOR OSTEOPOROSIS: ICD-10-CM

## 2025-01-20 DIAGNOSIS — E55.9 VITAMIN D DEFICIENCY: ICD-10-CM

## 2025-01-20 DIAGNOSIS — Z12.31 SCREENING MAMMOGRAM FOR BREAST CANCER: ICD-10-CM

## 2025-01-20 DIAGNOSIS — I10 ESSENTIAL HYPERTENSION, BENIGN: ICD-10-CM

## 2025-01-20 LAB
ALBUMIN SERPL BCP-MCNC: 4 G/DL (ref 3.4–5)
ALP SERPL-CCNC: 95 U/L (ref 33–136)
ALT SERPL W P-5'-P-CCNC: 6 U/L (ref 7–45)
ANION GAP SERPL CALCULATED.3IONS-SCNC: 10 MMOL/L (ref 10–20)
AST SERPL W P-5'-P-CCNC: 14 U/L (ref 9–39)
BASOPHILS # BLD AUTO: 0.05 X10*3/UL (ref 0–0.1)
BASOPHILS NFR BLD AUTO: 0.7 %
BILIRUB SERPL-MCNC: 0.5 MG/DL (ref 0–1.2)
BUN SERPL-MCNC: 21 MG/DL (ref 6–23)
CALCIUM SERPL-MCNC: 9.2 MG/DL (ref 8.6–10.3)
CHLORIDE SERPL-SCNC: 102 MMOL/L (ref 98–107)
CO2 SERPL-SCNC: 31 MMOL/L (ref 21–32)
CREAT SERPL-MCNC: 1.51 MG/DL (ref 0.5–1.05)
EGFRCR SERPLBLD CKD-EPI 2021: 38 ML/MIN/1.73M*2
EOSINOPHIL # BLD AUTO: 0.31 X10*3/UL (ref 0–0.7)
EOSINOPHIL NFR BLD AUTO: 4.3 %
ERYTHROCYTE [DISTWIDTH] IN BLOOD BY AUTOMATED COUNT: 12 % (ref 11.5–14.5)
GLUCOSE SERPL-MCNC: 150 MG/DL (ref 74–99)
HCT VFR BLD AUTO: 42.7 % (ref 36–46)
HGB BLD-MCNC: 14.3 G/DL (ref 12–16)
IMM GRANULOCYTES # BLD AUTO: 0.02 X10*3/UL (ref 0–0.7)
IMM GRANULOCYTES NFR BLD AUTO: 0.3 % (ref 0–0.9)
LYMPHOCYTES # BLD AUTO: 1.09 X10*3/UL (ref 1.2–4.8)
LYMPHOCYTES NFR BLD AUTO: 15.3 %
MCH RBC QN AUTO: 28.8 PG (ref 26–34)
MCHC RBC AUTO-ENTMCNC: 33.5 G/DL (ref 32–36)
MCV RBC AUTO: 86 FL (ref 80–100)
MONOCYTES # BLD AUTO: 0.57 X10*3/UL (ref 0.1–1)
MONOCYTES NFR BLD AUTO: 8 %
NEUTROPHILS # BLD AUTO: 5.1 X10*3/UL (ref 1.2–7.7)
NEUTROPHILS NFR BLD AUTO: 71.4 %
NRBC BLD-RTO: 0 /100 WBCS (ref 0–0)
PLATELET # BLD AUTO: 240 X10*3/UL (ref 150–450)
POTASSIUM SERPL-SCNC: 4.3 MMOL/L (ref 3.5–5.3)
PROT SERPL-MCNC: 6.8 G/DL (ref 6.4–8.2)
RBC # BLD AUTO: 4.97 X10*6/UL (ref 4–5.2)
SODIUM SERPL-SCNC: 139 MMOL/L (ref 136–145)
TSH SERPL-ACNC: 1.23 MIU/L (ref 0.44–3.98)
WBC # BLD AUTO: 7.1 X10*3/UL (ref 4.4–11.3)

## 2025-01-20 PROCEDURE — 84443 ASSAY THYROID STIM HORMONE: CPT

## 2025-01-20 PROCEDURE — 77063 BREAST TOMOSYNTHESIS BI: CPT

## 2025-01-20 PROCEDURE — 77067 SCR MAMMO BI INCL CAD: CPT | Performed by: RADIOLOGY

## 2025-01-20 PROCEDURE — 82607 VITAMIN B-12: CPT

## 2025-01-20 PROCEDURE — 82306 VITAMIN D 25 HYDROXY: CPT

## 2025-01-20 PROCEDURE — 85025 COMPLETE CBC W/AUTO DIFF WBC: CPT

## 2025-01-20 PROCEDURE — 77063 BREAST TOMOSYNTHESIS BI: CPT | Performed by: RADIOLOGY

## 2025-01-20 PROCEDURE — 80053 COMPREHEN METABOLIC PANEL: CPT

## 2025-01-21 ENCOUNTER — TELEPHONE (OUTPATIENT)
Dept: PRIMARY CARE | Facility: CLINIC | Age: 70
End: 2025-01-21
Payer: COMMERCIAL

## 2025-01-21 LAB
25(OH)D3 SERPL-MCNC: 40 NG/ML (ref 30–100)
VIT B12 SERPL-MCNC: 840 PG/ML (ref 211–911)

## 2025-01-21 NOTE — TELEPHONE ENCOUNTER
Kidney function has slight decline from previous testing, this could be due to elevated blood pressure or dehydration. I see cardiologist did start you on new medication, so this may normalized once BP improve. For now focus on increase hydration, and we will re-check in May.

## 2025-01-23 ENCOUNTER — OFFICE VISIT (OUTPATIENT)
Dept: GYNECOLOGIC ONCOLOGY | Facility: CLINIC | Age: 70
End: 2025-01-23
Payer: COMMERCIAL

## 2025-01-23 VITALS
WEIGHT: 149.47 LBS | HEART RATE: 77 BPM | DIASTOLIC BLOOD PRESSURE: 81 MMHG | BODY MASS INDEX: 27.34 KG/M2 | TEMPERATURE: 98.8 F | OXYGEN SATURATION: 95 % | SYSTOLIC BLOOD PRESSURE: 158 MMHG | RESPIRATION RATE: 18 BRPM

## 2025-01-23 DIAGNOSIS — Z08 ENCOUNTER FOR FOLLOW-UP SURVEILLANCE OF MALIGNANT NEOPLASM OF FEMALE GENITAL TRACT ORGAN: Primary | ICD-10-CM

## 2025-01-23 DIAGNOSIS — Z85.40 ENCOUNTER FOR FOLLOW-UP SURVEILLANCE OF MALIGNANT NEOPLASM OF FEMALE GENITAL TRACT ORGAN: Primary | ICD-10-CM

## 2025-01-23 DIAGNOSIS — C54.1 MALIGNANT NEOPLASM OF ENDOMETRIUM (MULTI): ICD-10-CM

## 2025-01-23 PROCEDURE — 1160F RVW MEDS BY RX/DR IN RCRD: CPT | Performed by: STUDENT IN AN ORGANIZED HEALTH CARE EDUCATION/TRAINING PROGRAM

## 2025-01-23 PROCEDURE — 3077F SYST BP >= 140 MM HG: CPT | Performed by: STUDENT IN AN ORGANIZED HEALTH CARE EDUCATION/TRAINING PROGRAM

## 2025-01-23 PROCEDURE — 1123F ACP DISCUSS/DSCN MKR DOCD: CPT | Performed by: STUDENT IN AN ORGANIZED HEALTH CARE EDUCATION/TRAINING PROGRAM

## 2025-01-23 PROCEDURE — 99214 OFFICE O/P EST MOD 30 MIN: CPT | Performed by: STUDENT IN AN ORGANIZED HEALTH CARE EDUCATION/TRAINING PROGRAM

## 2025-01-23 PROCEDURE — 1126F AMNT PAIN NOTED NONE PRSNT: CPT | Performed by: STUDENT IN AN ORGANIZED HEALTH CARE EDUCATION/TRAINING PROGRAM

## 2025-01-23 PROCEDURE — 1036F TOBACCO NON-USER: CPT | Performed by: STUDENT IN AN ORGANIZED HEALTH CARE EDUCATION/TRAINING PROGRAM

## 2025-01-23 PROCEDURE — 1159F MED LIST DOCD IN RCRD: CPT | Performed by: STUDENT IN AN ORGANIZED HEALTH CARE EDUCATION/TRAINING PROGRAM

## 2025-01-23 PROCEDURE — 3079F DIAST BP 80-89 MM HG: CPT | Performed by: STUDENT IN AN ORGANIZED HEALTH CARE EDUCATION/TRAINING PROGRAM

## 2025-01-23 ASSESSMENT — PAIN SCALES - GENERAL: PAINLEVEL_OUTOF10: 0-NO PAIN

## 2025-01-23 NOTE — PROGRESS NOTES
Patient ID: Simran Burroughs is a 69 y.o. female.  Referring Physician: No referring provider defined for this encounter.  Primary Care Provider: Sarina Don PA-C    Subjective    Patient presents today in surveillance for stage IB endometrial cancer s/p TLH/BSO, sentinel lymph node biopsies on 8/14/24. She reports she is overall doing well without significant side effects from treatment. Denies abdominopelvic pain, nausea/vomiting, fevers, chills, chest pain or shortness of breath. Voiding and having regular bowel movements without difficulty - occasional incontinence that is bothersome to her. Continues to use vaginal dilators 3x/week without any issues/pain or concerns. No recent falls. No other concerns/complaints. Denies interim hospitalizations since last assessment.     A comprehensive review of systems was performed and otherwise negative.       Objective    BSA: 1.72 meters squared  /81 (BP Location: Right arm, Patient Position: Sitting, BP Cuff Size: Adult long)   Pulse 77   Temp 37.1 °C (98.8 °F) (Temporal)   Resp 18   Wt 67.8 kg (149 lb 7.6 oz)   SpO2 95%   BMI 27.34 kg/m²     Wt Readings from Last 3 Encounters:   01/23/25 67.8 kg (149 lb 7.6 oz)   12/02/24 67.6 kg (149 lb)   11/18/24 67.6 kg (149 lb 0.5 oz)      Physical Exam  Vitals and nursing note reviewed. Exam conducted with a chaperone present.   Constitutional:       Appearance: Normal appearance. She is normal weight.   HENT:      Head: Normocephalic and atraumatic.      Mouth/Throat:      Mouth: Mucous membranes are moist.   Eyes:      Extraocular Movements: Extraocular movements intact.      Conjunctiva/sclera: Conjunctivae normal.      Pupils: Pupils are equal, round, and reactive to light.   Cardiovascular:      Rate and Rhythm: Normal rate.   Abdominal:      General: Bowel sounds are normal.      Palpations: Abdomen is soft. There is no mass.      Tenderness: There is no guarding or rebound.      Hernia: No hernia is present.       Comments: Laparoscopic incisions C/D/I without redness, drainage or erythema   Genitourinary:     General: Normal vulva.      Vagina: Normal. No vaginal discharge, erythema or lesions.      Comments: Surgically absent uterus and cervix  Intact vaginal cuff without separation or drainage   Musculoskeletal:         General: Normal range of motion.      Cervical back: Normal range of motion and neck supple.   Skin:     General: Skin is warm.      Findings: No erythema or rash.   Neurological:      General: No focal deficit present.      Mental Status: She is alert and oriented to person, place, and time. Mental status is at baseline.   Psychiatric:         Mood and Affect: Mood normal.         Behavior: Behavior normal.       Pathology - 8/14/24  ENDOMETRIUM   8th Edition - Protocol posted: 12/14/2022  ENDOMETRIUM - All Specimens  SPECIMEN   Procedure  Total hysterectomy and bilateral salpingo-oophorectomy   TUMOR   Tumor Site  Endometrium   Histologic Type  Endometrioid carcinoma, NOS   Histologic Grade  FIGO grade 1   Two-Tier Grading System  Low grade (encompassing FIGO 1 and 2)   Myometrial Invasion  Present   Depth of Myometrial Invasion  10 mm   Myometrial Thickness  13 mm   Percentage of Myometrial Invasion  77 %   Adenomyosis  Not identified   Uterine Serosa Involvement  Not identified   Lower Uterine Segment Involvement  Present, myoinvasive   Cervical Stromal Involvement  Not identified   Other Tissue / Organ Involvement  Not identified   Peritoneal / Ascitic Fluid  Not submitted / unknown   Lymphatic and / or Vascular Invasion  Not identified   REGIONAL LYMPH NODES   Regional Lymph Node Status  Not applicable (no regional lymph nodes submitted or found)   pTNM CLASSIFICATION (AJCC 8th Edition)   Reporting of pT, pN, and (when applicable) pM categories is based on information available to the pathologist at the time the report is issued. As per the AJCC (Chapter 1, 8th Ed.) it is the managing physician’s  responsibility to establish the final pathologic stage based upon all pertinent information, including but potentially not limited to this pathology report.   pT Category  pT1b   pN Category  pN not assigned (no nodes submitted or found)   N Suffix  (sn)   .     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); pMMR, TP53 wild-type  - 8/14/24: TLH/BSO, sentinel LND (no lymphoid tissue x2)      Uterine cancer (Multi) (Resolved)   8/14/2024 Cancer Staged    Staging form: Corpus Uteri - Carcinoma and Carcinosarcoma, AJCC 8th Edition, Clinical stage from 8/14/2024: FIGO Stage IB, calculated as Stage Unknown (cT1b, cNX, cM0) - Signed by Ananya Pablo MD on 9/19/2024, Staging comments: MMR proficient  TP53 wild-type     8/15/2024 Initial Diagnosis    Uterine cancer (Multi)        Diagnoses and all orders for this visit:  Malignant neoplasm of endometrium (Multi)  - Clinically POLO, no issues   - Signs and symptoms of disease recurrence were reviewed and all questions answered. We discussed the most common site for endometrial cancer recurrence includes abdomen and pelvis with additional risks of distant metastasis in lungs or liver. Symptoms for recurrent including: changes in bowel or bladder habits, abnormal vaginal discharge, and irregular bleeding.   - The following surveillance regimen is recommended based on NCCN and SGO guidelines and modified as necessary based on individual patient circumstances:            Surveillance Test Year 1 and 2  Year 3             Year 4 and 5            Physical Exam   4 months  6 months    6 months            Imaging   as needed  - Survivorship issues were addressed. Patient advised to return to office sooner if concerns/interim changes in health status. NP visits as karo/Samy annually  Health maintenance  - Mammogram 1/20/25; read pending  - Colonoscopy 11/2020    Ananya Pablo,  MD

## 2025-01-27 ENCOUNTER — TELEPHONE (OUTPATIENT)
Dept: PRIMARY CARE | Facility: CLINIC | Age: 70
End: 2025-01-27
Payer: COMMERCIAL

## 2025-01-27 ENCOUNTER — HOSPITAL ENCOUNTER (OUTPATIENT)
Dept: RADIOLOGY | Facility: HOSPITAL | Age: 70
Discharge: HOME | End: 2025-01-27
Payer: COMMERCIAL

## 2025-01-27 PROCEDURE — 77080 DXA BONE DENSITY AXIAL: CPT | Performed by: RADIOLOGY

## 2025-01-27 PROCEDURE — 77080 DXA BONE DENSITY AXIAL: CPT

## 2025-01-27 NOTE — TELEPHONE ENCOUNTER
Bone density scan shows Osteopenia. Osteopenia is a precursor to osteoporosis. To prevent progression, make sure you are on a daily Vitamin D supplement, increase calcium in diet or take calcium supplement, and increase weight bearing activity like walking.

## 2025-02-03 DIAGNOSIS — F43.23 ADJUSTMENT REACTION WITH ANXIETY AND DEPRESSION: ICD-10-CM

## 2025-02-03 RX ORDER — ESCITALOPRAM OXALATE 10 MG/1
10 TABLET ORAL DAILY
Qty: 90 TABLET | Refills: 1 | Status: SHIPPED | OUTPATIENT
Start: 2025-02-03

## 2025-02-17 DIAGNOSIS — E11.21 TYPE 2 DIABETES MELLITUS WITH DIABETIC NEPHROPATHY, UNSPECIFIED WHETHER LONG TERM INSULIN USE: ICD-10-CM

## 2025-02-17 RX ORDER — BLOOD-GLUCOSE SENSOR
EACH MISCELLANEOUS
Qty: 2 EACH | Refills: 11 | Status: SHIPPED | OUTPATIENT
Start: 2025-02-17 | End: 2026-02-17

## 2025-02-18 PROCEDURE — RXMED WILLOW AMBULATORY MEDICATION CHARGE

## 2025-02-22 ENCOUNTER — PHARMACY VISIT (OUTPATIENT)
Dept: PHARMACY | Facility: CLINIC | Age: 70
End: 2025-02-22
Payer: COMMERCIAL

## 2025-02-24 ENCOUNTER — PHARMACY VISIT (OUTPATIENT)
Dept: PHARMACY | Facility: CLINIC | Age: 70
End: 2025-02-24
Payer: COMMERCIAL

## 2025-02-28 ENCOUNTER — APPOINTMENT (OUTPATIENT)
Dept: OBSTETRICS AND GYNECOLOGY | Facility: CLINIC | Age: 70
End: 2025-02-28
Payer: COMMERCIAL

## 2025-03-13 ENCOUNTER — APPOINTMENT (OUTPATIENT)
Dept: ENDOCRINOLOGY | Facility: CLINIC | Age: 70
End: 2025-03-13
Payer: COMMERCIAL

## 2025-03-13 VITALS
WEIGHT: 146 LBS | SYSTOLIC BLOOD PRESSURE: 160 MMHG | HEART RATE: 71 BPM | HEIGHT: 62 IN | BODY MASS INDEX: 26.87 KG/M2 | DIASTOLIC BLOOD PRESSURE: 70 MMHG

## 2025-03-13 DIAGNOSIS — E53.8 VITAMIN B12 DEFICIENCY: ICD-10-CM

## 2025-03-13 DIAGNOSIS — E78.2 HYPERLIPEMIA, MIXED: ICD-10-CM

## 2025-03-13 DIAGNOSIS — I10 BENIGN HYPERTENSION: ICD-10-CM

## 2025-03-13 DIAGNOSIS — Z79.4 TYPE 2 DIABETES MELLITUS WITH DIABETIC POLYNEUROPATHY, WITH LONG-TERM CURRENT USE OF INSULIN: Primary | ICD-10-CM

## 2025-03-13 DIAGNOSIS — E11.3293 TYPE 2 DIABETES MELLITUS WITH BOTH EYES AFFECTED BY MILD NONPROLIFERATIVE RETINOPATHY WITHOUT MACULAR EDEMA, WITH LONG-TERM CURRENT USE OF INSULIN: ICD-10-CM

## 2025-03-13 DIAGNOSIS — Z79.4 TYPE 2 DIABETES MELLITUS WITH BOTH EYES AFFECTED BY MILD NONPROLIFERATIVE RETINOPATHY WITHOUT MACULAR EDEMA, WITH LONG-TERM CURRENT USE OF INSULIN: ICD-10-CM

## 2025-03-13 DIAGNOSIS — E11.42 TYPE 2 DIABETES MELLITUS WITH DIABETIC POLYNEUROPATHY, WITH LONG-TERM CURRENT USE OF INSULIN: Primary | ICD-10-CM

## 2025-03-13 LAB — POC HEMOGLOBIN A1C: 8.7 % (ref 4.2–6.5)

## 2025-03-13 PROCEDURE — 3078F DIAST BP <80 MM HG: CPT | Performed by: INTERNAL MEDICINE

## 2025-03-13 PROCEDURE — 1123F ACP DISCUSS/DSCN MKR DOCD: CPT | Performed by: INTERNAL MEDICINE

## 2025-03-13 PROCEDURE — 3008F BODY MASS INDEX DOCD: CPT | Performed by: INTERNAL MEDICINE

## 2025-03-13 PROCEDURE — 3077F SYST BP >= 140 MM HG: CPT | Performed by: INTERNAL MEDICINE

## 2025-03-13 PROCEDURE — 83036 HEMOGLOBIN GLYCOSYLATED A1C: CPT | Performed by: INTERNAL MEDICINE

## 2025-03-13 PROCEDURE — 99214 OFFICE O/P EST MOD 30 MIN: CPT | Performed by: INTERNAL MEDICINE

## 2025-03-13 PROCEDURE — RXMED WILLOW AMBULATORY MEDICATION CHARGE

## 2025-03-13 PROCEDURE — 1159F MED LIST DOCD IN RCRD: CPT | Performed by: INTERNAL MEDICINE

## 2025-03-13 PROCEDURE — 1036F TOBACCO NON-USER: CPT | Performed by: INTERNAL MEDICINE

## 2025-03-13 RX ORDER — SPIRONOLACTONE 25 MG/1
25 TABLET ORAL DAILY
Qty: 30 TABLET | Refills: 5 | Status: SHIPPED | OUTPATIENT
Start: 2025-03-13 | End: 2026-03-13

## 2025-03-13 RX ORDER — INSULIN LISPRO 100 [IU]/ML
INJECTION, SOLUTION INTRAVENOUS; SUBCUTANEOUS
Qty: 15 ML | Refills: 5 | Status: SHIPPED | OUTPATIENT
Start: 2025-03-13

## 2025-03-13 RX ORDER — INSULIN DEGLUDEC 100 U/ML
INJECTION, SOLUTION SUBCUTANEOUS
Qty: 30 ML | Refills: 5 | Status: SHIPPED | OUTPATIENT
Start: 2025-03-13

## 2025-03-13 RX ORDER — CARVEDILOL 25 MG/1
25 TABLET ORAL 2 TIMES DAILY
Qty: 60 TABLET | Refills: 5 | Status: SHIPPED | OUTPATIENT
Start: 2025-03-13 | End: 2026-03-13

## 2025-03-13 RX ORDER — PEN NEEDLE, DIABETIC 30 GX3/16"
NEEDLE, DISPOSABLE MISCELLANEOUS
Qty: 200 EACH | Refills: 5 | Status: SHIPPED | OUTPATIENT
Start: 2025-03-13

## 2025-03-13 NOTE — PROGRESS NOTES
HPI   70 yo with DM Type 2 (diagnosed in her 50s, + retinopathy, + charcot foot on R, +nephropathy), HTN, HLD, CAD, CHF, uterine cancer (s/p hysterectomy and radiation), highest wt 180lbs. Last A1c-8.3%, today 8.7%.     Patient testing sugars 6 times day with Sulma 3 maryann. No lows. Following carb controlled diet somewhat and know reasonable carb allowances - admits diet has been worse lately, eating out more. Patient able to afford their medications. Patient is exercising.     Taking Tresiba 32 units daily, Ozempic 40 clicks weekly (slow titrating as tolerated) on a 2mg pen, and Farxiga 10mg daily.  -pt is intolerant to metformin  -no TZD d/t CHF    14 day sulma 3 data: 46%  in range, 0% low, pattern: 200's overnight, waking upper 100's/after breakfast 250 range, upper 100's at dinner/250 range post dinner, bedtime low 200's     Taking amlodipine 10mg daily, carvedilol 12.5mg BID,  losartan 100mg daily, hydrochlorothiazide 25mg (new) for htn/chf.   -Managed per Dr. Gross  -compliance is an issue      Taking pravastatin 20mg daily for lipids and tolerating.        Current Outpatient Medications:     amLODIPine (Norvasc) 10 mg tablet, Take 1 tablet (10 mg) by mouth once daily., Disp: 90 tablet, Rfl: 3    blood-glucose sensor (FreeStyle Sulma 3 Sensor) device, USE AS DIRECTED CHANGE SENSOR EVERY 14 DAYS, Disp: 2 each, Rfl: 11    carvedilol (Coreg) 12.5 mg tablet, TAKE 1 TABLET(12.5 MG) BY MOUTH TWICE DAILY, Disp: 180 tablet, Rfl: 3    cholecalciferol (Vitamin D-3) 125 MCG (5000 UT) capsule, Take 1 capsule (125 mcg) by mouth once daily., Disp: , Rfl:     cyanocobalamin (Vitamin B-12) 1,000 mcg tablet, Take 1 tablet (1,000 mcg) by mouth once daily., Disp: , Rfl:     dapagliflozin propanediol (Farxiga) 10 mg, TAKE 1 TABLET BY MOUTH ONE TIME DAILY, Disp: 30 tablet, Rfl: 5    escitalopram (Lexapro) 10 mg tablet, TAKE 1 TABLET(10 MG) BY MOUTH DAILY, Disp: 90 tablet, Rfl: 1    hydroCHLOROthiazide (HYDRODiuril) 25 mg tablet,  "Take 1 tablet (25 mg) by mouth once daily., Disp: 90 tablet, Rfl: 3    insulin degludec (Tresiba FlexTouch U-100) 100 unit/mL (3 mL) injection, INJECT 64 UNITS SUBCUTANEOUSLY ONE TIME DAILY AT BEDTIME, Disp: 30 mL, Rfl: 5    pen needle, diabetic 32 gauge x 5/32\" needle, Inject 1 Pen needle under the skin 4 times a day.  use 4 pen needles per day to inject insulin, Disp: , Rfl:     polyethylene glycol (Glycolax, Miralax) 17 gram packet, Take 17 g by mouth once daily as needed (constipation)., Disp: , Rfl:     pravastatin (Pravachol) 20 mg tablet, Take 1 tablet (20 mg) by mouth once daily at bedtime., Disp: 90 tablet, Rfl: 3    semaglutide (Ozempic) 2 mg/dose (8 mg/3 mL) pen injector, INJECT 2 MG UNDER THE SKIN ONCE WEEKLY AS DIRECTED, Disp: 3 mL, Rfl: 5      Allergies as of 03/13/2025 - Reviewed 03/13/2025   Allergen Reaction Noted    Naproxen sodium Other and Unknown 08/25/2023    Lisinopril Cough 08/25/2023         Review of Systems   Cardiology: Lightheadedness-denies.  Chest pain-denies.  Leg edema-denies.  Palpitations-denies.  Respiratory: Cough-denies. Shortness of breath-denies.  Wheezing-denies.  Gastroenterology: Constipation-denies.  Diarrhea-denies.  Heartburn-denies.  Endocrinology: Cold intolerance-denies.  Heat intolerance-denies.  Sweats-denies.  Neurology: Headache-denies.  Tremor-denies.  Neuropathy in extremities +  Psychology: Low energy-denies.  Irritability-denies.  Sleep disturbances-denies.      /70 (BP Location: Left arm, Patient Position: Sitting)   Pulse 71   Ht 1.575 m (5' 2\")   Wt 66.2 kg (146 lb)   BMI 26.70 kg/m²       Labs:  Lab Results   Component Value Date    WBC 7.1 01/20/2025    NRBC 0.0 01/20/2025    RBC 4.97 01/20/2025    HGB 14.3 01/20/2025    HCT 42.7 01/20/2025     01/20/2025     Lab Results   Component Value Date    CALCIUM 9.2 01/20/2025    AST 14 01/20/2025    ALKPHOS 95 01/20/2025    BILITOT 0.5 01/20/2025    PROT 6.8 01/20/2025    ALBUMIN 4.0 01/20/2025 "    GLOB 3.0 12/31/2021    AGR 1.4 (L) 12/31/2021     01/20/2025    K 4.3 01/20/2025     01/20/2025    CO2 31 01/20/2025    ANIONGAP 10 01/20/2025    BUN 21 01/20/2025    CREATININE 1.51 (H) 01/20/2025    UREACREAUR 13.1 12/31/2021    GLUCOSE 150 (H) 01/20/2025    ALT 6 (L) 01/20/2025    EGFR 38 (L) 01/20/2025     Lab Results   Component Value Date    CHOL 164 05/15/2024    TRIG 87 05/15/2024    HDL 52.0 05/15/2024    LDLCALC 95 05/15/2024     Lab Results   Component Value Date    MICROALBCREA 1,752.3 05/15/2024     Lab Results   Component Value Date    TSH 1.23 01/20/2025     Lab Results   Component Value Date    PBKEDRFA55 840 01/20/2025     Lab Results   Component Value Date    HGBA1C 8.7 (A) 03/13/2025         Physical Exam   General Appearance: pleasant, cooperative, no acute distress  HEENT: no chemosis, no proptosis, no lid lag, no lid retraction  Neck: no lymphadenopathy, no thyromegaly, no dominant thyroid nodules  Heart: no murmurs, regular rate and rhythm, S1 and S2  Lungs: no wheezes, no rhonci, no rales  Extremities: no lower extremity swelling      Assessment/Plan   1. Type 2 diabetes mellitus with diabetic polyneuropathy, with long-term current use of insulin (Primary)  -A1c ordered and reviewed  -marquis data reviewed  -labs reviewed    -compliance with meds/diet an issue  -add humalog 4 units with meals, uptitrate by 1 unit up to 10 units for blood sugars <200 after meals until next visit  -uptitrate ozmepic slowly as tolerated    2. Hyperlipemia, mixed  -on statin and tolerating, labs reviewed, will follow    3.hypertension  -home bp's, pill container for compliance as issue  -increase carvedilol from 12.5 to 25mg bid  -add aldactone 25mg and lower to  1/2 losartan 100mg every day  -repeat labs in 1 week after doing this to check potassium and cr    4. Vitamin B12 deficiency           Follow Up:  Cde in 3 months    Medical Decision Making  Complexity of problem: Chronic illness of diabetes  mellitus uncontrolled, progressing  Data analyzed and reviewed: Reviewed prior notes, blood glucose data, labs including HgbA1c, lipids, serum chemistries.  Ordered tests.   Risk of complications and morbidities: Is definite because of use of insulin and risk of hypoglycemia.  Prescription medications reviewed and modifications made.  Compliance assessed.  Addressed social determinants of health including food insecurity.

## 2025-03-17 PROCEDURE — RXMED WILLOW AMBULATORY MEDICATION CHARGE

## 2025-03-18 ENCOUNTER — PHARMACY VISIT (OUTPATIENT)
Dept: PHARMACY | Facility: CLINIC | Age: 70
End: 2025-03-18
Payer: COMMERCIAL

## 2025-04-01 PROCEDURE — RXMED WILLOW AMBULATORY MEDICATION CHARGE

## 2025-04-02 ENCOUNTER — PHARMACY VISIT (OUTPATIENT)
Dept: PHARMACY | Facility: CLINIC | Age: 70
End: 2025-04-02
Payer: COMMERCIAL

## 2025-04-15 PROCEDURE — RXMED WILLOW AMBULATORY MEDICATION CHARGE

## 2025-04-16 ENCOUNTER — PHARMACY VISIT (OUTPATIENT)
Dept: PHARMACY | Facility: CLINIC | Age: 70
End: 2025-04-16
Payer: COMMERCIAL

## 2025-04-16 LAB
ANION GAP SERPL CALCULATED.4IONS-SCNC: 9 MMOL/L (CALC) (ref 7–17)
BUN SERPL-MCNC: 26 MG/DL (ref 7–25)
BUN/CREAT SERPL: 16 (CALC) (ref 6–22)
CALCIUM SERPL-MCNC: 9.6 MG/DL (ref 8.6–10.4)
CHLORIDE SERPL-SCNC: 105 MMOL/L (ref 98–110)
CO2 SERPL-SCNC: 28 MMOL/L (ref 20–32)
CREAT SERPL-MCNC: 1.67 MG/DL (ref 0.5–1.05)
EGFRCR SERPLBLD CKD-EPI 2021: 33 ML/MIN/1.73M2
GLUCOSE SERPL-MCNC: 176 MG/DL (ref 65–99)
POTASSIUM SERPL-SCNC: 4.6 MMOL/L (ref 3.5–5.3)
SODIUM SERPL-SCNC: 142 MMOL/L (ref 135–146)

## 2025-05-06 PROCEDURE — RXMED WILLOW AMBULATORY MEDICATION CHARGE

## 2025-05-07 ENCOUNTER — PHARMACY VISIT (OUTPATIENT)
Dept: PHARMACY | Facility: CLINIC | Age: 70
End: 2025-05-07
Payer: COMMERCIAL

## 2025-05-16 ENCOUNTER — TELEPHONE (OUTPATIENT)
Dept: RADIATION ONCOLOGY | Facility: HOSPITAL | Age: 70
End: 2025-05-16
Payer: COMMERCIAL

## 2025-05-16 NOTE — TELEPHONE ENCOUNTER
Called pt to remind of appointment on 5/19/2025 at 1:30. Pt's phone went to voicemail left number if needs to reschedule.

## 2025-05-19 ENCOUNTER — APPOINTMENT (OUTPATIENT)
Dept: RADIATION ONCOLOGY | Facility: HOSPITAL | Age: 70
End: 2025-05-19
Payer: COMMERCIAL

## 2025-05-19 ENCOUNTER — OFFICE VISIT (OUTPATIENT)
Dept: PRIMARY CARE | Facility: CLINIC | Age: 70
End: 2025-05-19
Payer: COMMERCIAL

## 2025-05-19 VITALS
SYSTOLIC BLOOD PRESSURE: 142 MMHG | WEIGHT: 152 LBS | DIASTOLIC BLOOD PRESSURE: 74 MMHG | HEART RATE: 84 BPM | BODY MASS INDEX: 27.97 KG/M2 | HEIGHT: 62 IN | OXYGEN SATURATION: 97 %

## 2025-05-19 DIAGNOSIS — I50.22 CHRONIC SYSTOLIC (CONGESTIVE) HEART FAILURE: ICD-10-CM

## 2025-05-19 DIAGNOSIS — E11.21 TYPE 2 DIABETES MELLITUS WITH DIABETIC NEPHROPATHY, WITH LONG-TERM CURRENT USE OF INSULIN: ICD-10-CM

## 2025-05-19 DIAGNOSIS — F43.23 ADJUSTMENT REACTION WITH ANXIETY AND DEPRESSION: ICD-10-CM

## 2025-05-19 DIAGNOSIS — M54.6 ACUTE RIGHT-SIDED THORACIC BACK PAIN: ICD-10-CM

## 2025-05-19 DIAGNOSIS — Z11.59 ENCOUNTER FOR HEPATITIS C SCREENING TEST FOR LOW RISK PATIENT: ICD-10-CM

## 2025-05-19 DIAGNOSIS — I10 ESSENTIAL HYPERTENSION, BENIGN: ICD-10-CM

## 2025-05-19 DIAGNOSIS — Z00.00 ANNUAL PHYSICAL EXAM: Primary | ICD-10-CM

## 2025-05-19 DIAGNOSIS — Z79.4 TYPE 2 DIABETES MELLITUS WITH DIABETIC NEPHROPATHY, WITH LONG-TERM CURRENT USE OF INSULIN: ICD-10-CM

## 2025-05-19 DIAGNOSIS — E78.2 HYPERLIPEMIA, MIXED: ICD-10-CM

## 2025-05-19 PROBLEM — C54.1 ENDOMETRIAL CANCER (MULTI): Status: RESOLVED | Noted: 2024-09-24 | Resolved: 2025-05-19

## 2025-05-19 PROCEDURE — 99397 PER PM REEVAL EST PAT 65+ YR: CPT

## 2025-05-19 PROCEDURE — 1159F MED LIST DOCD IN RCRD: CPT

## 2025-05-19 PROCEDURE — 3008F BODY MASS INDEX DOCD: CPT

## 2025-05-19 PROCEDURE — 99214 OFFICE O/P EST MOD 30 MIN: CPT

## 2025-05-19 PROCEDURE — 3052F HG A1C>EQUAL 8.0%<EQUAL 9.0%: CPT

## 2025-05-19 PROCEDURE — 1126F AMNT PAIN NOTED NONE PRSNT: CPT

## 2025-05-19 PROCEDURE — 3078F DIAST BP <80 MM HG: CPT

## 2025-05-19 PROCEDURE — 3077F SYST BP >= 140 MM HG: CPT

## 2025-05-19 PROCEDURE — 1036F TOBACCO NON-USER: CPT

## 2025-05-19 ASSESSMENT — ENCOUNTER SYMPTOMS
LIGHT-HEADEDNESS: 0
NAUSEA: 0
BACK PAIN: 1
BLOOD IN STOOL: 0
ABDOMINAL PAIN: 1
COUGH: 0
SORE THROAT: 0
DIARRHEA: 0
DYSURIA: 0
DIAPHORESIS: 0
PALPITATIONS: 0
FEVER: 0
SHORTNESS OF BREATH: 0
VOMITING: 0
ADENOPATHY: 0
WHEEZING: 0
DYSPHORIC MOOD: 0
NERVOUS/ANXIOUS: 0
DIFFICULTY URINATING: 0
HEMATURIA: 0

## 2025-05-19 ASSESSMENT — COGNITIVE AND FUNCTIONAL STATUS - GENERAL: VERBAL FLUENCY - ANIMAL NAMES (0 TO 25): 3

## 2025-05-19 ASSESSMENT — PAIN SCALES - GENERAL: PAINLEVEL_OUTOF10: 0-NO PAIN

## 2025-05-19 NOTE — PROGRESS NOTES
"Subjective   Patient ID: Simran Burroughs is a 70 y.o. female who presents for Annual Exam.    Hx IDDM (last A1x 8.7 3/2024 so insulin adjusted), CKD (started on farziga) CAD, ASCVD, HLD, HTN, s/p hysterectomy (uterine cancer)    Other providers: Previous PCP Dr. Hansen, Dr. Gross (cardiology- retiring), Margi Laughlin CNP (endocrinologist), Dr. Angel Spring and Dr. Peterson (ophthalmologist)    All medications besides Lexapro prescribed through specialist.     Acute concerns: RUQ pain and lateral right back pain. Not constant, but off and on for a month, and bothersome few times a weak. Rated \"1\" right now and described as \"pressure.\" Sometimes worse with bending over. Denies nausea, vomiting, diarrhea, constipation, fever, chills, blood in stool, changes in physical activity, falls.     Depression with anxiety: controlled 10 mg Lexapro. Has been on for years. Denies SI.     HTN: managed by cardiologist. Improving from last visit. Started on hydrochlorothiazide by cardiologist but could not remember to take, so switched to spirolactone but was nauseas on so stopped. Then her endocrinologist increased her carvedilol from 12.5 to 25 mg. Next cardiologist 6/9/2025. On 100 mg Losartan, 10 mg amlodipine, 25 mg BID Carvidolol.  Denies chest pain, heart palpitations, SOB, dizziness, headaches, changes of vision, syncope, leg swelling.      Wears hearing aides.     HM: PAP s/p hysterectomy. Mammogram 1/20/2025. Colon screening, colonoscopy 2/22/2021 repeat 10 years. Lung screening never smoker. DEXA 1/27/2025. Vaccinations -Tdap 2016. Prevnar 20 5/15/2024, flu UTD, shingrex considering.               Review of Systems   Constitutional:  Negative for diaphoresis and fever.   HENT:  Negative for congestion, hearing loss and sore throat.    Eyes:  Negative for visual disturbance.   Respiratory:  Negative for cough, shortness of breath and wheezing.    Cardiovascular:  Negative for chest pain, palpitations and leg " "swelling.   Gastrointestinal:  Positive for abdominal pain. Negative for blood in stool, diarrhea, nausea and vomiting.   Genitourinary:  Negative for difficulty urinating, dysuria and hematuria.   Musculoskeletal:  Positive for back pain.   Skin:  Negative for rash.   Allergic/Immunologic: Negative for immunocompromised state.   Neurological:  Negative for syncope and light-headedness.   Hematological:  Negative for adenopathy.   Psychiatric/Behavioral:  Negative for dysphoric mood and suicidal ideas. The patient is not nervous/anxious.        Objective   /74   Pulse 84   Ht 1.575 m (5' 2\")   Wt 68.9 kg (152 lb)   SpO2 97%   BMI 27.80 kg/m²     Physical Exam  Constitutional:       General: She is not in acute distress.     Appearance: Normal appearance.   HENT:      Right Ear: Tympanic membrane and ear canal normal.      Left Ear: Tympanic membrane and ear canal normal.      Nose: Nose normal.      Mouth/Throat:      Mouth: Mucous membranes are moist.      Pharynx: Oropharynx is clear. No oropharyngeal exudate or posterior oropharyngeal erythema.   Eyes:      Extraocular Movements: Extraocular movements intact.      Conjunctiva/sclera: Conjunctivae normal.      Pupils: Pupils are equal, round, and reactive to light.   Neck:      Thyroid: No thyroid mass or thyromegaly.      Vascular: No carotid bruit.   Cardiovascular:      Rate and Rhythm: Normal rate and regular rhythm.      Pulses: Normal pulses.      Heart sounds: No murmur heard.     No gallop.   Pulmonary:      Effort: Pulmonary effort is normal.      Breath sounds: No wheezing, rhonchi or rales.   Abdominal:      General: Bowel sounds are normal.      Palpations: Abdomen is soft. There is no hepatomegaly, splenomegaly or mass.      Tenderness: There is abdominal tenderness in the right upper quadrant. There is no guarding.   Musculoskeletal:         General: Normal range of motion.        Arms:       Thoracic back: Tenderness present. Normal range " of motion.      Right lower leg: No edema.      Left lower leg: No edema.   Lymphadenopathy:      Cervical: No cervical adenopathy.   Skin:     General: Skin is warm.      Findings: No rash.   Neurological:      General: No focal deficit present.      Mental Status: She is alert.      Motor: Motor function is intact. No weakness.   Psychiatric:         Mood and Affect: Mood normal.         Thought Content: Thought content normal.         Assessment/Plan   Diagnoses and all orders for this visit:  Annual physical exam  Essential hypertension, benign  -     Comprehensive metabolic panel; Future  Type 2 diabetes mellitus with diabetic nephropathy, with long-term current use of insulin  -     Hemoglobin A1c; Future  -     Albumin-Creatinine Ratio, Urine Random; Future  Hyperlipemia, mixed  -     Lipid panel; Future  Adjustment reaction with anxiety and depression  Encounter for hepatitis C screening test for low risk patient  -     Hepatitis C Antibody; Future  Acute right-sided thoracic back pain  Will treat right back/RUQ pain as MSK with lidocaine patches, Tylenol, heat and stretches and if not improving call and will consider US of abdomen. Obtain labs in one months. Follow-up 6 months for routine.

## 2025-05-21 ENCOUNTER — TELEPHONE (OUTPATIENT)
Dept: RADIATION ONCOLOGY | Facility: HOSPITAL | Age: 70
End: 2025-05-21
Payer: COMMERCIAL

## 2025-05-21 NOTE — TELEPHONE ENCOUNTER
Called pt to remind of appointment on 5/22/2025 at 4:30. Pt's phone went to voicemail left number if needs to reschedule.

## 2025-05-22 ENCOUNTER — HOSPITAL ENCOUNTER (OUTPATIENT)
Dept: RADIATION ONCOLOGY | Facility: HOSPITAL | Age: 70
Setting detail: RADIATION/ONCOLOGY SERIES
Discharge: HOME | End: 2025-05-22
Payer: COMMERCIAL

## 2025-05-22 VITALS
SYSTOLIC BLOOD PRESSURE: 148 MMHG | DIASTOLIC BLOOD PRESSURE: 73 MMHG | TEMPERATURE: 97.9 F | OXYGEN SATURATION: 96 % | BODY MASS INDEX: 28.28 KG/M2 | HEART RATE: 71 BPM | RESPIRATION RATE: 18 BRPM | WEIGHT: 154.6 LBS

## 2025-05-22 DIAGNOSIS — C54.1 MALIGNANT NEOPLASM OF ENDOMETRIUM (MULTI): Primary | ICD-10-CM

## 2025-05-22 PROCEDURE — 99213 OFFICE O/P EST LOW 20 MIN: CPT | Performed by: NURSE PRACTITIONER

## 2025-05-22 ASSESSMENT — ENCOUNTER SYMPTOMS
BLOOD IN STOOL: 0
NAUSEA: 0
FLANK PAIN: 0
CARDIOVASCULAR NEGATIVE: 1
RESPIRATORY NEGATIVE: 1
HEMATURIA: 0
UNEXPECTED WEIGHT CHANGE: 0
VOMITING: 0
FREQUENCY: 0
ABDOMINAL PAIN: 0
DYSURIA: 0
FEVER: 0
PSYCHIATRIC NEGATIVE: 1
HEMATOLOGIC/LYMPHATIC NEGATIVE: 1
ACTIVITY CHANGE: 0
MUSCULOSKELETAL NEGATIVE: 1
APPETITE CHANGE: 0
FATIGUE: 0
ABDOMINAL DISTENTION: 0
RECTAL PAIN: 0
CONSTIPATION: 1
DIFFICULTY URINATING: 0
CHILLS: 0
DIARRHEA: 0
ANAL BLEEDING: 0
NEUROLOGICAL NEGATIVE: 1
DIAPHORESIS: 0

## 2025-05-22 ASSESSMENT — PAIN SCALES - GENERAL: PAINLEVEL_OUTOF10: 0-NO PAIN

## 2025-05-22 NOTE — PROGRESS NOTES
Patient ID: [id number]     Simran Burroughs is a 70 y.o. female with Stage IB,pT1b, Nx, Mx endometrial carcinoma, endometroid type, 77%MMI, -LVSI, p53wt, MMRp.   She has high risk features including her age, stage IB and MMI (77%).   We reviewed the role and rationale for adjuvant radiotherapy (VBT and/or EBRT) in the management of HIR endometrial carcinoma. We discussed the benefits of VBT with regard to reducing the risk of vaginal cuff failures while not impacting pelvic recurrence and EBRT in reducing the rates of vaginal and pelvic recurrence rate. We discussed the logistics and comparative toxicities between the two treatment modalities including, but not limited to bowel, bladder and vaginal toxicity.   The patient opted to proceed with VBT. She expressed understanding and agreement with the plan.   The patient completed radiotherapy as outlined below.     Radiation Treatment Summary:    Treatment site: Vaginal cuff  Treatment dates: 10/7/24-10/21/24  Fractions: 5/5  Dose: 3000 cGy  Technique: HDR        Concurrent Chemotherapy:  N/A     CANCER HISTORY:  -04/2024: presented with 1 year vaginal spotting  -05/15/2024 TVUS; Large 6.1 cm complex cystic and frond-like solid mass involving the majority of the uterus  -06/05/2024 MRI pelvis; Infiltrative polypoidal endometrial soft tissue mass measuring 3.8 x 2.9 cm with a evidence of surrounding foci of myometrial wall invasion   -07/11/2024 endometrial biopsy; low grade endometrioid type endometiral carcinoma   -08/11/2024 DALIA,BSO,SLND; endometrial adenocarcinoma, endometroid type, pT1b Nx , MMR p, P53wt, FIGO Stage IB, Grade I    History of presenting illness    Simran Burroughs is a 70 y.o. female who presents today for follow up 7 months s/p HDR to the vaginal cuff.  Energy baseline. Appetite is good. She has gained some weight.  Denies pelvic pain, vaginal bleeding or discharge.  Denies dysuria or hematuria. Endorses leakage. She has been working with UroGyn and  this has improved. Patient has constipation at baseline. Takes benefiber daily. No rectal bleeding. She is not currently sexually active. No longer using the vaginal dilators. Follow up with gyn onc 5/29/25.     Review of systems:  Review of Systems   Constitutional:  Negative for activity change, appetite change, chills, diaphoresis, fatigue, fever and unexpected weight change.   Respiratory: Negative.     Cardiovascular: Negative.    Gastrointestinal:  Positive for constipation. Negative for abdominal distention, abdominal pain, anal bleeding, blood in stool, diarrhea, nausea, rectal pain and vomiting.   Genitourinary:  Positive for enuresis. Negative for decreased urine volume, difficulty urinating, dysuria, flank pain, frequency, hematuria, menstrual problem, pelvic pain, urgency, vaginal bleeding, vaginal discharge and vaginal pain.   Musculoskeletal: Negative.    Skin: Negative.    Neurological: Negative.    Hematological: Negative.    Psychiatric/Behavioral: Negative.         Past Medical history  She  has a past surgical history that includes Other surgical history (12/13/2021); Coronary artery bypass graft (2014); and Hysterectomy.      Last recorded vital:  /73 (Patient Position: Sitting)   Pulse 71   Temp 36.6 °C (97.9 °F) (Tympanic)   Resp 18   Wt 70.1 kg (154 lb 9.6 oz)   SpO2 96%   BMI 28.28 kg/m²     Physical exam  Physical Exam  Constitutional:       General: She is not in acute distress.     Appearance: Normal appearance. She is not ill-appearing, toxic-appearing or diaphoretic.   HENT:      Head: Normocephalic.   Cardiovascular:      Rate and Rhythm: Normal rate and regular rhythm.      Pulses: Normal pulses.      Heart sounds: Normal heart sounds.   Pulmonary:      Effort: Pulmonary effort is normal.      Breath sounds: Normal breath sounds.   Abdominal:      General: Bowel sounds are normal. There is no distension.      Palpations: Abdomen is soft. There is no mass.      Tenderness:  There is no abdominal tenderness. There is no guarding or rebound.      Hernia: No hernia is present.   Genitourinary:     General: Normal vulva.      Pubic Area: No rash.       Labia:         Right: No rash, tenderness, lesion or injury.         Left: No rash, tenderness, lesion or injury.       Urethra: No prolapse, urethral pain, urethral swelling or urethral lesion.      Vagina: No vaginal discharge.      Comments: Normal external genitalia. Smooth vaginal canal. RT changes  noted. No evidence of masses or lesions. No tenderness on exam. No abnormal vaginal bleeding or discharge.   Musculoskeletal:         General: Normal range of motion.      Cervical back: Normal range of motion and neck supple.   Lymphadenopathy:      Lower Body: No right inguinal adenopathy. No left inguinal adenopathy.   Skin:     General: Skin is warm and dry.   Neurological:      General: No focal deficit present.      Mental Status: She is alert and oriented to person, place, and time.   Psychiatric:         Mood and Affect: Mood normal.         Behavior: Behavior normal.         Thought Content: Thought content normal.         Judgment: Judgment normal.       Plan:  Assessment/Plan     70 year old female 7 month s/p HDR to the vaginal cuff. Patient is doing well with no acute complaints related to treatment. POLO on exam. No longer using the dilators. Continue follow up with Gyn onc as scheduled. Will reach out to team to push back next appointment to August to get us back in rotation. Patient to return to clinic in 6 months unless needs to be seen sooner. Instructed to call with any questions or concerns.

## 2025-05-27 PROCEDURE — RXMED WILLOW AMBULATORY MEDICATION CHARGE

## 2025-05-29 ENCOUNTER — APPOINTMENT (OUTPATIENT)
Dept: GYNECOLOGIC ONCOLOGY | Facility: CLINIC | Age: 70
End: 2025-05-29
Payer: COMMERCIAL

## 2025-05-29 ENCOUNTER — PHARMACY VISIT (OUTPATIENT)
Dept: PHARMACY | Facility: CLINIC | Age: 70
End: 2025-05-29
Payer: COMMERCIAL

## 2025-05-31 DIAGNOSIS — I10 BENIGN HYPERTENSION: ICD-10-CM

## 2025-05-31 DIAGNOSIS — E78.00 ELEVATED CHOLESTEROL: ICD-10-CM

## 2025-06-03 RX ORDER — PRAVASTATIN SODIUM 20 MG/1
TABLET ORAL
Qty: 90 TABLET | Refills: 3 | Status: SHIPPED | OUTPATIENT
Start: 2025-06-03

## 2025-06-03 RX ORDER — AMLODIPINE BESYLATE 10 MG/1
10 TABLET ORAL DAILY
Qty: 90 TABLET | Refills: 3 | Status: SHIPPED | OUTPATIENT
Start: 2025-06-03

## 2025-06-19 DIAGNOSIS — Z79.4 TYPE 2 DIABETES MELLITUS WITH DIABETIC NEPHROPATHY, WITH LONG-TERM CURRENT USE OF INSULIN: ICD-10-CM

## 2025-06-19 DIAGNOSIS — I10 ESSENTIAL HYPERTENSION, BENIGN: ICD-10-CM

## 2025-06-19 DIAGNOSIS — Z11.59 ENCOUNTER FOR HEPATITIS C SCREENING TEST FOR LOW RISK PATIENT: ICD-10-CM

## 2025-06-19 DIAGNOSIS — E78.2 HYPERLIPEMIA, MIXED: ICD-10-CM

## 2025-06-19 DIAGNOSIS — E11.21 TYPE 2 DIABETES MELLITUS WITH DIABETIC NEPHROPATHY, WITH LONG-TERM CURRENT USE OF INSULIN: ICD-10-CM

## 2025-06-24 ENCOUNTER — APPOINTMENT (OUTPATIENT)
Dept: ENDOCRINOLOGY | Facility: CLINIC | Age: 70
End: 2025-06-24
Payer: COMMERCIAL

## 2025-06-24 VITALS
WEIGHT: 149 LBS | HEART RATE: 72 BPM | DIASTOLIC BLOOD PRESSURE: 69 MMHG | SYSTOLIC BLOOD PRESSURE: 141 MMHG | BODY MASS INDEX: 27.25 KG/M2

## 2025-06-24 DIAGNOSIS — E11.42 TYPE 2 DIABETES MELLITUS WITH DIABETIC POLYNEUROPATHY, WITH LONG-TERM CURRENT USE OF INSULIN: Primary | ICD-10-CM

## 2025-06-24 DIAGNOSIS — E78.2 HYPERLIPEMIA, MIXED: ICD-10-CM

## 2025-06-24 DIAGNOSIS — Z79.4 TYPE 2 DIABETES MELLITUS WITH DIABETIC POLYNEUROPATHY, WITH LONG-TERM CURRENT USE OF INSULIN: Primary | ICD-10-CM

## 2025-06-24 DIAGNOSIS — I10 BENIGN HYPERTENSION: ICD-10-CM

## 2025-06-24 LAB — POC HEMOGLOBIN A1C: 7.6 % (ref 4.2–6.5)

## 2025-06-24 PROCEDURE — 99214 OFFICE O/P EST MOD 30 MIN: CPT | Mod: 25 | Performed by: PHARMACIST

## 2025-06-24 PROCEDURE — 95251 CONT GLUC MNTR ANALYSIS I&R: CPT | Performed by: INTERNAL MEDICINE

## 2025-06-24 PROCEDURE — 99214 OFFICE O/P EST MOD 30 MIN: CPT | Performed by: INTERNAL MEDICINE

## 2025-06-24 PROCEDURE — 83036 HEMOGLOBIN GLYCOSYLATED A1C: CPT | Performed by: PHARMACIST

## 2025-06-24 ASSESSMENT — PATIENT HEALTH QUESTIONNAIRE - PHQ9
1. LITTLE INTEREST OR PLEASURE IN DOING THINGS: NOT AT ALL
2. FEELING DOWN, DEPRESSED OR HOPELESS: NOT AT ALL
SUM OF ALL RESPONSES TO PHQ9 QUESTIONS 1 & 2: 0

## 2025-06-24 ASSESSMENT — PAIN SCALES - GENERAL: PAINLEVEL_OUTOF10: 0-NO PAIN

## 2025-06-24 NOTE — PATIENT INSTRUCTIONS
Your A1c was 7.6% today.     Continue Humalog 6-10 units before meals.   -Pay attention to patterns on your Sulma maryann and learn which meals cause higher sugars - work on taking a couple extra units for meals that tend to cause higher sugars.   -If your sugar is high BEFORE your meal, use the scale provided today to take a few extra units with your mealtime dose to correct your already high sugar (adding 2u for every 50 points over 150).     Continue Tresiba 32 units daily.     Continue Farxiga 10mg daily.     Continue slowly increasing the dose of Ozempic on the 2mg pen - if you start to feel sick at a high dose, then drop back down to the previously well tolerated dose.     Complete labs before next visit.     Follow up with Dr. Samson in 6 months.

## 2025-06-24 NOTE — PROGRESS NOTES
Subjective   Patient ID: Simran Burroughs is a 70 y.o. female who presents for Diabetes (Type 2 recheck).  HPI  71 yo with DM Type 2 (diagnosed in her 50s, + retinopathy, + charcot foot on R, +nephropathy), HTN, HLD, CAD, CHF, uterine cancer (s/p hysterectomy and radiation), highest wt 180lbs. Last A1c-8.7%, today 7.6%.     Patient testing sugars 6 times day with Sulma 3 maryann. No lows. Following carb controlled diet somewhat and know reasonable carb allowances. Patient able to afford their medications. Patient is exercising.     Taking Tresiba 32 units daily, Humalog 6-10 units before meals (new last visit, adjusting based on meals, occ forgets), Ozempic 40 clicks weekly on a 2mg pen (slow titrating as tolerated), and Farxiga 10mg daily.  -pt is intolerant to metformin  -no TZD d/t CHF    30 day sulma 3 data: 58%  in range, 0% low, pattern: mid 100's overnight, waking upper 100's, after breakfast upper 100's/low 200's range, mid-upper 100's at dinner, upper 100's/low 200's range post dinner, bedtime mid-upper 100's.   -can have excursions into mid 200-300 range when she forgets the Humalog before a meal.      Taking amlodipine 10mg daily, carvedilol 12.5mg BID,  losartan 100mg daily, hydrochlorothiazide 25mg (new) for htn/chf.   -Managed per cardiology.   -compliance has been an issue.  -reports home BP's have been around 150/80's.     Taking pravastatin 20mg daily for lipids and tolerating.    Current Outpatient Medications:     amLODIPine (Norvasc) 10 mg tablet, TAKE 1 TABLET(10 MG) BY MOUTH DAILY, Disp: 90 tablet, Rfl: 3    blood-glucose sensor (FreeStyle Sulma 3 Sensor) device, USE AS DIRECTED. CHANGE SENSOR EVERY 14 DAYS, Disp: 2 each, Rfl: 11    carvedilol (Coreg) 25 mg tablet, Take 1 tablet (25 mg) by mouth 2 times a day., Disp: 60 tablet, Rfl: 5    cholecalciferol (Vitamin D-3) 125 MCG (5000 UT) capsule, Take 1 capsule (125 mcg) by mouth once daily., Disp: , Rfl:     cyanocobalamin (Vitamin B-12) 1,000 mcg  "tablet, Take 1 tablet (1,000 mcg) by mouth once daily., Disp: , Rfl:     dapagliflozin propanediol (Farxiga) 10 mg tablet, TAKE 1 TABLET BY MOUTH ONE TIME DAILY, Disp: 30 tablet, Rfl: 5    escitalopram (Lexapro) 10 mg tablet, TAKE 1 TABLET(10 MG) BY MOUTH DAILY, Disp: 90 tablet, Rfl: 1    insulin degludec (Tresiba FlexTouch U-100) 100 unit/mL (3 mL) pen, Inject 32 units subcutaneously daily as directed, Disp: 30 mL, Rfl: 5    insulin lispro (HumaLOG) 100 unit/mL pen, Inject subcutaneously at meals as directed, up to a total of 50 units daily, Disp: 15 mL, Rfl: 5    pen needle, diabetic 32 gauge x 5/32\" needle, Use 4 pen needles per day to inject insulin, Disp: 200 each, Rfl: 5    polyethylene glycol (Glycolax, Miralax) 17 gram packet, Take 17 g by mouth once daily as needed (constipation)., Disp: , Rfl:     pravastatin (Pravachol) 20 mg tablet, TAKE 1 TABLET(20 MG) BY MOUTH DAILY AT BEDTIME, Disp: 90 tablet, Rfl: 3    semaglutide (Ozempic) 2 mg/dose (8 mg/3 mL) pen injector, INJECT 2 MG UNDER THE SKIN ONCE WEEKLY AS DIRECTED, Disp: 3 mL, Rfl: 5   Allergies   Allergen Reactions    Naproxen Sodium Other and Unknown     HYPOTENSION    Lisinopril Cough       Review of Systems  See HPI.     Objective   /69 (BP Location: Right arm, Patient Position: Sitting)   Pulse 72   Wt 67.6 kg (149 lb)   BMI 27.25 kg/m²     Labs:   Lab Results   Component Value Date    HGBA1C 7.6 (A) 06/24/2025     Lab Results   Component Value Date    CALCIUM 9.6 04/16/2025    AST 14 01/20/2025    ALKPHOS 95 01/20/2025    BILITOT 0.5 01/20/2025    PROT 6.8 01/20/2025    ALBUMIN 4.0 01/20/2025    GLOB 3.0 12/31/2021    AGR 1.4 (L) 12/31/2021     04/16/2025    K 4.6 04/16/2025     04/16/2025    CO2 28 04/16/2025    ANIONGAP 9 04/16/2025    BUN 26 (H) 04/16/2025    CREATININE 1.67 (H) 04/16/2025    UREACREAUR 13.1 12/31/2021    GLUCOSE 176 (H) 04/16/2025    ALT 6 (L) 01/20/2025    EGFR 33 (L) 04/16/2025     Lab Results   Component " "Value Date    WBC 7.1 01/20/2025    NRBC 0.0 01/20/2025    RBC 4.97 01/20/2025    HGB 14.3 01/20/2025    HCT 42.7 01/20/2025     01/20/2025     Lab Results   Component Value Date    CHOL 164 05/15/2024    TRIG 87 05/15/2024    HDL 52.0 05/15/2024    LDLCALC 95 05/15/2024     Lab Results   Component Value Date    CREATU 75.9 05/15/2024    MICROALBCREA 1,752.3 05/15/2024     Lab Results   Component Value Date    TSH 1.23 01/20/2025     Lab Results   Component Value Date    FVGKETEF49 840 01/20/2025     Lab Results   Component Value Date    VITD25 40 01/20/2025     No results found for: \"PTH\"  Lab Results   Component Value Date    MG 2.15 08/14/2024       Assessment    1. Type 2 diabetes mellitus with diabetic polyneuropathy, with long-term current use of insulin    2. Hyperlipemia, mixed    3. Benign hypertension        Medical Decision Making  Complexity of problem: Chronic illness of diabetes mellitus uncontrolled, progressing  Data analyzed and reviewed: Reviewed prior notes, blood glucose data, labs including HgbA1c, lipids, serum chemistries.  Ordered tests.   Risk of complications and morbidities: Is definite because of use of insulin and risk of hypoglycemia.  Prescription medications reviewed and modifications made.  Compliance assessed.  Addressed social determinants of health including food insecurity.    Plan   1. Type 2 diabetes mellitus with diabetic polyneuropathy, with long-term current use of insulin (Primary)  - POCT glycosylated hemoglobin (Hb A1C) manually resulted  - Albumin-Creatinine Ratio, Urine Random    -A1c ordered and reviewed.   -CGM data downloaded and reviewed.   -Labs reviewed.     -A1c improved from last visit, but remains above target.   -Sugars improved with addition of prandial insulin - pt admits to forgetting it at times.   -Overall sugars look good when pt takes Humalog as directed - work on taking more consistently before meals.  -Pt to learn from Sulma readings and " identify meals that cause higher sugars and take a couple extra units for those meals.    -Would also benefit from addition of correction scale +2u for 50 > 150 - printed scale provided to patient today.   -Pt to continue to slowly titrate up on Ozempic dose as tolerated - if she starts to have side effects at a higher dose, then pt can drop back to previously tolerated dose.   -Continue all other meds as order.   -Overdue for urine microalbumin - order placed, complete before next visit.     2. Hyperlipemia, mixed  - Lipid Panel    -On statin and tolerating, LDL above target.  -Due for repeat fasting lipid panel - order placed.   -Continue current therapy - would likely benefit from switch to atorvastatin or rosuvastatin in the future.   -Given hx of diabetes and CAD, would target LDL < 55.     3. Benign hypertension  -BP slightly elevated today; reports home BP's have been high.   -Managed per cardiology, has upcoming visit.   -Continue current therapy, follow as scheduled with cardiology.      -labs/tests/notes reviewed  -reviewed and counseled patient on medication monitoring and side effects    Follow Up: 6 months BB    Treatment and plan discussed with Dr. Merritt Samson.   JOSÉ Hancock, PharmD, BCACP, CDCES.

## 2025-06-24 NOTE — PROGRESS NOTES
Attestation signed by Merritt Samson MD on 6/24/25 at 11:19 AM.    I, Dr Merritt Samson, have reviewed this progress note, medication list, vital signs, any pertinent lab values, and any CGM data if present with the [facility] and  face to face during this visit today. This note reflects the treatment plan that was made under my direction after reviewing the above mentioned elements while face to face with the patient and CDE.  I personally answered and addressed any questions and concerns the patient had during the visit today.  The CDE entered the data in this note under my direction and I personally reviewed it, signed any lab or medication orders that I instructed to be completed. I am the billing provider for this visit and the level of service was determined by my involvement in the Medical Decision Making Component of this visit while face to face with the patient.

## 2025-06-26 PROCEDURE — RXMED WILLOW AMBULATORY MEDICATION CHARGE

## 2025-07-01 ENCOUNTER — PHARMACY VISIT (OUTPATIENT)
Dept: PHARMACY | Facility: CLINIC | Age: 70
End: 2025-07-01
Payer: COMMERCIAL

## 2025-07-10 PROCEDURE — RXMED WILLOW AMBULATORY MEDICATION CHARGE

## 2025-07-15 ENCOUNTER — PHARMACY VISIT (OUTPATIENT)
Dept: PHARMACY | Facility: CLINIC | Age: 70
End: 2025-07-15
Payer: COMMERCIAL

## 2025-08-11 ENCOUNTER — TELEPHONE (OUTPATIENT)
Dept: CARDIOLOGY | Facility: CLINIC | Age: 70
End: 2025-08-11
Payer: COMMERCIAL

## 2025-08-14 ENCOUNTER — APPOINTMENT (OUTPATIENT)
Facility: CLINIC | Age: 70
End: 2025-08-14
Payer: COMMERCIAL

## 2025-08-14 ENCOUNTER — APPOINTMENT (OUTPATIENT)
Dept: GYNECOLOGIC ONCOLOGY | Facility: CLINIC | Age: 70
End: 2025-08-14
Payer: COMMERCIAL

## 2025-08-14 VITALS
HEIGHT: 62 IN | HEART RATE: 79 BPM | OXYGEN SATURATION: 96 % | DIASTOLIC BLOOD PRESSURE: 81 MMHG | SYSTOLIC BLOOD PRESSURE: 179 MMHG | BODY MASS INDEX: 27.64 KG/M2 | WEIGHT: 150.2 LBS | RESPIRATION RATE: 18 BRPM

## 2025-08-14 DIAGNOSIS — Z79.4 TYPE 2 DIABETES MELLITUS WITH DIABETIC NEPHROPATHY, WITH LONG-TERM CURRENT USE OF INSULIN: ICD-10-CM

## 2025-08-14 DIAGNOSIS — E11.69 TYPE 2 DIABETES MELLITUS WITH OTHER SPECIFIED COMPLICATION, UNSPECIFIED WHETHER LONG TERM INSULIN USE (MULTI): ICD-10-CM

## 2025-08-14 DIAGNOSIS — C54.1 MALIGNANT NEOPLASM OF ENDOMETRIUM (MULTI): Primary | ICD-10-CM

## 2025-08-14 DIAGNOSIS — E11.21 TYPE 2 DIABETES MELLITUS WITH DIABETIC NEPHROPATHY, WITH LONG-TERM CURRENT USE OF INSULIN: ICD-10-CM

## 2025-08-14 PROCEDURE — 3077F SYST BP >= 140 MM HG: CPT | Performed by: NURSE PRACTITIONER

## 2025-08-14 PROCEDURE — 3079F DIAST BP 80-89 MM HG: CPT | Performed by: NURSE PRACTITIONER

## 2025-08-14 PROCEDURE — 3051F HG A1C>EQUAL 7.0%<8.0%: CPT | Performed by: NURSE PRACTITIONER

## 2025-08-14 PROCEDURE — 4010F ACE/ARB THERAPY RXD/TAKEN: CPT | Performed by: NURSE PRACTITIONER

## 2025-08-14 PROCEDURE — RXMED WILLOW AMBULATORY MEDICATION CHARGE

## 2025-08-14 PROCEDURE — 99213 OFFICE O/P EST LOW 20 MIN: CPT | Performed by: NURSE PRACTITIONER

## 2025-08-14 PROCEDURE — 3008F BODY MASS INDEX DOCD: CPT | Performed by: NURSE PRACTITIONER

## 2025-08-14 PROCEDURE — 1126F AMNT PAIN NOTED NONE PRSNT: CPT | Performed by: NURSE PRACTITIONER

## 2025-08-14 RX ORDER — ERGOCALCIFEROL 1.25 MG/1
1.25 CAPSULE ORAL WEEKLY
COMMUNITY

## 2025-08-14 RX ORDER — HYDROCHLOROTHIAZIDE 25 MG/1
25 TABLET ORAL DAILY
COMMUNITY
Start: 2025-07-20

## 2025-08-14 RX ORDER — DAPAGLIFLOZIN 10 MG/1
10 TABLET, FILM COATED ORAL DAILY
Qty: 30 TABLET | Refills: 5 | Status: SHIPPED | OUTPATIENT
Start: 2025-08-14 | End: 2026-08-14

## 2025-08-14 RX ORDER — SEMAGLUTIDE 2.68 MG/ML
INJECTION, SOLUTION SUBCUTANEOUS
Qty: 3 ML | Refills: 5 | Status: CANCELLED | OUTPATIENT
Start: 2025-08-14 | End: 2026-08-14

## 2025-08-14 RX ORDER — LOSARTAN POTASSIUM 100 MG/1
100 TABLET ORAL DAILY
COMMUNITY

## 2025-08-14 RX ORDER — SEMAGLUTIDE 2.68 MG/ML
INJECTION, SOLUTION SUBCUTANEOUS
Qty: 3 ML | Refills: 5 | Status: SHIPPED | OUTPATIENT
Start: 2025-08-14 | End: 2026-08-14

## 2025-08-14 ASSESSMENT — PAIN SCALES - GENERAL: PAINLEVEL_OUTOF10: 0-NO PAIN

## 2025-08-18 ENCOUNTER — PHARMACY VISIT (OUTPATIENT)
Dept: PHARMACY | Facility: CLINIC | Age: 70
End: 2025-08-18
Payer: COMMERCIAL

## 2025-08-19 ENCOUNTER — PHARMACY VISIT (OUTPATIENT)
Dept: PHARMACY | Facility: CLINIC | Age: 70
End: 2025-08-19
Payer: COMMERCIAL

## 2025-12-18 ENCOUNTER — APPOINTMENT (OUTPATIENT)
Dept: GYNECOLOGIC ONCOLOGY | Facility: CLINIC | Age: 70
End: 2025-12-18
Payer: COMMERCIAL

## 2025-12-19 ENCOUNTER — APPOINTMENT (OUTPATIENT)
Facility: CLINIC | Age: 70
End: 2025-12-19
Payer: COMMERCIAL

## (undated) DEVICE — Device

## (undated) DEVICE — SYRINGE, 35 CC, LUER LOCK, MONOJECT, W/O CAP, LF

## (undated) DEVICE — DRAPE, PAD, PREP, W/ 9 IN CUFF, 24 X 41, LF, NS

## (undated) DEVICE — HOLSTER, JET SAFETY

## (undated) DEVICE — GOWN, SURGICAL, SMARTGOWN, XLARGE, STERILE

## (undated) DEVICE — BAG, TISSUE RETRIEVAL, 10MM, ANCHOR

## (undated) DEVICE — DRESSING, ADHESIVE, ISLAND, TELFA, 2 X 3.75 IN, LF

## (undated) DEVICE — OCCLUDER, COLPO-PNEUMO

## (undated) DEVICE — SYRINGE, 60 CC, LUER LOCK, MONOJECT, W/O CAP, LF

## (undated) DEVICE — LIGASURE, V SEALER/DIVIDER  5MM BLUNT TIP

## (undated) DEVICE — TUBE SET, PNEUMOCLEAR, SMOKE EVACU, HIGH-FLOW

## (undated) DEVICE — TOWEL, SURGICAL, NEURO, O/R, 16 X 26, BLUE, STERILE

## (undated) DEVICE — DRAPE, TIBURON W/ADHESIVE, 19 X 30

## (undated) DEVICE — TUBE, SALEM SUMP, 16 FR X 48IN, ENFIT

## (undated) DEVICE — SYRINGE, W/CANNULA, VIAL ACCESS, INTERLINK, W/NEEDLE, 15 G

## (undated) DEVICE — RETRACTOR, CERVICAL CUP, VCARE, STANDARD

## (undated) DEVICE — TROCAR SYSTEM, BALLOON, KII GELPORT, 12 X 100MM

## (undated) DEVICE — PREP TRAY, SKIN, DRY, W/GLOVES

## (undated) DEVICE — SUTURE, VICRYL, 4-0, 18 IN, UNDYED BR PS-2

## (undated) DEVICE — SYRINGE, LUER LOCK, 12ML

## (undated) DEVICE — ELECTRODE, OPTI2 LAPAROSCOPIC SPATULA, CURVED

## (undated) DEVICE — COVER, CART, 45 X 27 X 48 IN, CLEAR

## (undated) DEVICE — CAUTERY, PENCIL, PUSH BUTTON, SMOKE EVAC, 70MM

## (undated) DEVICE — REST, HEAD, BAGEL, 9 IN

## (undated) DEVICE — SYSTEM, FIOS FIRST ENTRY, 5 X 100MM, KII ADVANCED FIXATION

## (undated) DEVICE — POSITIONING, THE PINK PAD, PIGAZZI SYSTEM

## (undated) DEVICE — SUTURE, VICRYL, 0, 27 IN, UR-6, VIOLET

## (undated) DEVICE — IRRIGATION SET, CYSTOSCOPY, F/CONSTANT/INTERMITTENT, 8 GTT/CC, 77 IN

## (undated) DEVICE — MANIFOLD, 4 PORT NEPTUNE STANDARD